# Patient Record
Sex: FEMALE | Race: WHITE | NOT HISPANIC OR LATINO | Employment: FULL TIME | URBAN - METROPOLITAN AREA
[De-identification: names, ages, dates, MRNs, and addresses within clinical notes are randomized per-mention and may not be internally consistent; named-entity substitution may affect disease eponyms.]

---

## 2017-01-03 ENCOUNTER — INITIAL CONSULT (OUTPATIENT)
Dept: BARIATRICS | Facility: CLINIC | Age: 37
End: 2017-01-03
Payer: COMMERCIAL

## 2017-01-03 VITALS
SYSTOLIC BLOOD PRESSURE: 124 MMHG | WEIGHT: 233.25 LBS | HEART RATE: 78 BPM | BODY MASS INDEX: 45.79 KG/M2 | DIASTOLIC BLOOD PRESSURE: 78 MMHG | HEIGHT: 60 IN

## 2017-01-03 DIAGNOSIS — E66.01 MORBID OBESITY WITH BMI OF 45.0-49.9, ADULT: ICD-10-CM

## 2017-01-03 DIAGNOSIS — K21.9 GASTROESOPHAGEAL REFLUX DISEASE, ESOPHAGITIS PRESENCE NOT SPECIFIED: ICD-10-CM

## 2017-01-03 PROCEDURE — 99244 OFF/OP CNSLTJ NEW/EST MOD 40: CPT | Mod: S$GLB,,, | Performed by: INTERNAL MEDICINE

## 2017-01-03 PROCEDURE — 99999 PR PBB SHADOW E&M-EST. PATIENT-LVL III: CPT | Mod: PBBFAC,,, | Performed by: INTERNAL MEDICINE

## 2017-01-03 RX ORDER — PHENTERMINE HYDROCHLORIDE 37.5 MG/1
37.5 TABLET ORAL
Qty: 30 TABLET | Refills: 0 | Status: SHIPPED | OUTPATIENT
Start: 2017-01-03 | End: 2017-02-02

## 2017-01-03 NOTE — PROGRESS NOTES
Subjective:       Patient ID: Sarah Ulloa is a 36 y.o. female.    Chief Complaint: Consult    HPI Comments: Current attempts at weight loss: New pt to me referred by Nadia Parks PA-C  [address] [place] [address] Patient Active Problem List:     Gastroesophageal reflux disease without esophagitis     BMI 45.0-49.9, adult     Bipolar Disorder  Has not been doing anything to work on her weight. She is not exercising. Recently had consult for possible Nissen and it was suggested that she lose weight first for better outcome.                    Previous diet attempts: Has done nutri-system in past lost 6-7 lbs in 2 months.     Heaviest weight: 233    Lightest weight: 125#    Goal weight: 160#    History of medication for loss: Surgery not covered. Phentermine and topiramate in past. Lost 15 lbs in 2 month.     Typical eating patterns:   Breakfast: Skips or coffee with 2 % milk and splenda.     Lunch: Lovely- Roast beef or turkey with quinones and mustard. SOmetimes chips.     Dinner: Goes out a lot. Chicken parmesean, Crab cakes.     Snacks: Ice cream.     Beverages: Diet green tea and Diet pepsi    Willingness to change:  10/10        BMR: 1680      Review of Systems   Constitutional: Negative for chills and fever.   Respiratory: Negative for apnea and shortness of breath.         + snores   Cardiovascular: Negative for chest pain and leg swelling.   Gastrointestinal: Negative for constipation and diarrhea.        + GERD   Genitourinary: Negative for difficulty urinating and menstrual problem.   Musculoskeletal: Positive for arthralgias and back pain.        Hips   Neurological: Negative for dizziness and light-headedness.   Psychiatric/Behavioral: Positive for dysphoric mood. The patient is nervous/anxious.         Under control. Sees Psych       Objective:       Visit Vitals    /78    Pulse 78    Ht 5' (1.524 m)    Wt 105.8 kg (233 lb 4 oz)    BMI 45.55 kg/m2       Physical Exam    Constitutional: She is oriented to person, place, and time. She appears well-developed. No distress.   Morbidly obese     HENT:   Head: Normocephalic and atraumatic.   Eyes: EOM are normal. Pupils are equal, round, and reactive to light.   Neck: Normal range of motion. Neck supple. No thyromegaly present.   Cardiovascular: Normal rate and normal heart sounds.  Exam reveals no gallop and no friction rub.    No murmur heard.  Pulmonary/Chest: Effort normal and breath sounds normal. No respiratory distress. She has no wheezes.   Abdominal: Soft. Bowel sounds are normal. She exhibits no distension. There is no tenderness.   Musculoskeletal: Normal range of motion. She exhibits no edema.   Neurological: She is alert and oriented to person, place, and time. No cranial nerve deficit.   Skin: Skin is warm and dry. No erythema.   Psychiatric: She has a normal mood and affect. Her behavior is normal. Judgment normal.   Vitals reviewed.      Assessment:       1. Gastroesophageal reflux disease, esophagitis presence not specified    2. Morbid obesity with BMI of 45.0-49.9, adult        Plan:             1. Gastroesophageal reflux disease, esophagitis presence not specified  May see some improvement in her sx with weight loss. If they do persist, then certainly the weight loss would help improve outcome of surgical treatment    2. Morbid obesity with BMI of 45.0-49.9, adult    - phentermine (ADIPEX-P) 37.5 mg tablet; Take 1 tablet (37.5 mg total) by mouth before breakfast.  Dispense: 30 tablet; Refill: 0    Exercise 30 min 3 days a week this month.     Wean artificial sweeteners. Replace with water.     3 meals a day made up of the following:  Unlimited green vegetables, tomatoes, mushrooms, spaghetti squash, cauliflower, meat, poultry, seafood, eggs and hard cheeses.   Avoid fried foods  Dressings, seasonings, condiments, etc should have less than 2 g sugars.    beans or nuts can have 1 x a day.   2-3 servings of citrus  fruits, berries, pineapple or melon a day (1 cup)  Milk and plain yogurt    [email or web address]    Patient warned of common side effects of phentermine including anxiety, insomnia, palpitations and increased blood pressure. It was also explained that it is for short-term usage along with diet and exercise, and that stopping the medication without making lifestyle changes will result in regain of weight. Patient states understanding.    Start phentermine with 1/2 pill a day to see if that will control your appetite.  Go up to a full pill when needed.     Return in about 4 weeks

## 2017-01-03 NOTE — PATIENT INSTRUCTIONS
Exercise 30 min 3 days a week this month.     Wean artificial sweeteners. Replace with water.     3 meals a day made up of the following:  Unlimited green vegetables, tomatoes, mushrooms, spaghetti squash, cauliflower, meat, poultry, seafood, eggs and hard cheeses.   Avoid fried foods  Dressings, seasonings, condiments, etc should have less than 2 g sugars.    beans or nuts can have 1 x a day.   2-3 servings of citrus fruits, berries, pineapple or melon a day (1 cup)  Milk and plain yogurt    [email or web address]    Patient warned of common side effects of phentermine including anxiety, insomnia, palpitations and increased blood pressure. It was also explained that it is for short-term usage along with diet and exercise, and that stopping the medication without making lifestyle changes will result in regain of weight. Patient states understanding.    Start phentermine with 1/2 pill a day to see if that will control your appetite.  Go up to a full pill when needed.     Return in about 4 weeks (around 1/31/2017).

## 2017-01-03 NOTE — MR AVS SNAPSHOT
[facility] - Bariatric Surgery  [address]  [facility] [address]  Phone: [phone]  Fax: [phone]                  Sarah Ulloa   1/3/2017 9:15 AM   Initial consult    Description:  Female : 1980   Provider:  Uzma Sanchez MD   Department:  [facility] - Bariatric Surgery           Reason for Visit     Consult           Diagnoses this Visit        Comments    Morbid obesity with BMI of 45.0-49.9, adult    -  Primary            To Do List           Future Appointments        Provider Department Dept Phone    2/3/2017 1:45 PM Uzma Sanchez MD [facility] - Bariatric Surgery [phone]      Goals (5 Years of Data)     None      Follow-Up and Disposition     Return in about 4 weeks (around 2017).       These Medications        Disp Refills Start End    phentermine (ADIPEX-P) 37.5 mg tablet 30 tablet 0 1/3/2017 2017    Take 1 tablet (37.5 mg total) by mouth before breakfast. - Oral    Pharmacy: [id number] - [place] - [address] [place] [address] #: [phone]         [facility] On Call     [facility] On Call Nurse Care Line -  Assistance  Registered nurses in the [facility] provide clinical advisement, health education, appointment booking, and other advisory services.  Call for this free service at [phone].             Medications           Message regarding Medications     Verify the changes and/or additions to your medication regime listed below are the same as discussed with your clinician today.  If any of these changes or additions are incorrect, please notify your healthcare provider.        START taking these NEW medications        Refills    phentermine (ADIPEX-P) 37.5 mg tablet 0    Sig: Take 1 tablet (37.5 mg total) by mouth before breakfast.    Class: Print    Route: Oral           Verify that the below list of medications is an accurate representation of the medications you are currently taking.  If none reported, the list may  be blank. If incorrect, please contact your healthcare provider. Carry this list with you in case of emergency.           Current Medications     buPROPion (WELLBUTRIN SR) 200 MG TbSR 200 mg 2 (two) times daily.    lamotrigine (LAMICTAL) 150 MG Tab 150 mg 2 (two) times daily.    pantoprazole (PROTONIX) 40 MG tablet Take 40 mg by mouth once daily.    phentermine (ADIPEX-P) 37.5 mg tablet Take 1 tablet (37.5 mg total) by mouth before breakfast.    SAPHRIS, BLACK CHERRY, 5 mg Subl 5 mg once daily.    tramadol (ULTRAM) 50 mg tablet 50 mg daily as needed. One or two tablets PRN    TRINTELLIX 10 mg Tab 10 mg once daily.           Clinical Reference Information           Vital Signs - Last Recorded  Most recent update: 1/3/2017  9:24 AM by Rosa Horowitz MA    BP Pulse Ht Wt BMI    124/78 78 5' (1.524 m) 105.8 kg (233 lb 4 oz) 45.55 kg/m2      Blood Pressure          Most Recent Value    BP  124/78      Allergies as of 1/3/2017     Percocet [Oxycodone-acetaminophen]    Sulfa (Sulfonamide Antibiotics)      Immunizations Administered on Date of Encounter - 1/3/2017     None      Instructions    Exercise 30 min 3 days a week this month.     Wean artificial sweeteners. Replace with water.     3 meals a day made up of the following:  Unlimited green vegetables, tomatoes, mushrooms, spaghetti squash, cauliflower, meat, poultry, seafood, eggs and hard cheeses.   Avoid fried foods  Dressings, seasonings, condiments, etc should have less than 2 g sugars.    beans or nuts can have 1 x a day.   2-3 servings of citrus fruits, berries, pineapple or melon a day (1 cup)  Milk and plain yogurt    [email or web address]    Patient warned of common side effects of phentermine including anxiety, insomnia, palpitations and increased blood pressure. It was also explained that it is for short-term usage along with diet and exercise, and that stopping the medication without making lifestyle changes will result in regain of weight. Patient states  understanding.    Start phentermine with 1/2 pill a day to see if that will control your appetite.  Go up to a full pill when needed.     Return in about 4 weeks (around 1/31/2017).

## 2017-01-03 NOTE — LETTER
January 4, 2017      Nadia Parks PA-C  [address]  [facility] [address]           [facility] - Bariatric Surgery  [address]  [facility] [address]  Phone: [phone]  Fax: [phone]          Patient: Sarah Ulloa   MR Number: [id number]   YOB: 1980   Date of Visit: 1/3/2017       Dear Nadia Parks:    Thank you for referring Sarah Ulloa to me for evaluation. Attached you will find relevant portions of my assessment and plan of care.    If you have questions, please do not hesitate to call me. I look forward to following Sarah Ulloa along with you.    Sincerely,    Uzma Sanchez MD    Enclosure  CC:  No Recipients    If you would like to receive this communication electronically, please contact [email or web address] or [phone] to request more information on [facility] access.    For providers and/or their staff who would like to refer a patient to [facility], please contact us through our one-stop-shop provider referral line, [facility], at [phone].    If you feel you have received this communication in error or would no longer like to receive these types of communications, please e-mail [email or web address]

## 2017-01-04 ENCOUNTER — PATIENT MESSAGE (OUTPATIENT)
Dept: BARIATRICS | Facility: CLINIC | Age: 37
End: 2017-01-04

## 2017-01-06 ENCOUNTER — TELEPHONE (OUTPATIENT)
Dept: SURGERY | Facility: CLINIC | Age: 37
End: 2017-01-06

## 2017-01-06 NOTE — TELEPHONE ENCOUNTER
----- Message from Jonathon Peck MD sent at 1/3/2017  2:24 PM CST -----  Regarding: RE: NISSEN VS LRNY  Thanks Malissa.    Team, please obtain her outside egd, motility and ph for possible lap fundo.  Ask her if she would like to see Dr. Sanchez for medical weight loss.    ----- Message -----     From: Nadia Parks PA-C     Sent: 12/23/2016   5:22 PM       To: Jonathon Peck MD  Subject: NISSEN VS LRNY                                   Dr. Peck,    This patient can't afford the out of pocket for the bypass.  She would like to do the Nissen for her reflux.  You mentioned to her that they may not approve it due to her being morbidly obese.  Can you call and discuss this with her.     Laura may be a good option.      Nadia

## 2017-01-10 ENCOUNTER — OFFICE VISIT (OUTPATIENT)
Dept: SURGERY | Facility: CLINIC | Age: 37
End: 2017-01-10
Payer: COMMERCIAL

## 2017-01-10 VITALS
TEMPERATURE: 99 F | HEART RATE: 101 BPM | HEIGHT: 60 IN | BODY MASS INDEX: 45.16 KG/M2 | SYSTOLIC BLOOD PRESSURE: 138 MMHG | WEIGHT: 230 LBS | DIASTOLIC BLOOD PRESSURE: 98 MMHG

## 2017-01-10 DIAGNOSIS — K21.9 GASTROESOPHAGEAL REFLUX DISEASE WITHOUT ESOPHAGITIS: Primary | ICD-10-CM

## 2017-01-10 PROCEDURE — 99999 PR PBB SHADOW E&M-EST. PATIENT-LVL III: CPT | Mod: PBBFAC,,, | Performed by: SURGERY

## 2017-01-10 PROCEDURE — 99213 OFFICE O/P EST LOW 20 MIN: CPT | Mod: S$GLB,,, | Performed by: SURGERY

## 2017-01-10 PROCEDURE — 1159F MED LIST DOCD IN RCRD: CPT | Mod: S$GLB,,, | Performed by: SURGERY

## 2017-01-10 NOTE — LETTER
[facility]  [address]  [facility] [address]  Phone: [phone] January 10, 2017      Oanh Germain MD  [address]   [address]  [place] [address]    Patient: Sarah Ulloa   MR Number: [id number]   YOB: 1980   Date of Visit: 1/10/2017     Dear Dr. Germain:    Thank you for referring Sarah Ulloa to me for evaluation. Below are the relevant portions of my assessment and plan of care.    Sarah Ulloa is a 36-year-old female.presents with:     1. Gastroesophageal reflux disease without esophagitis    2. BMI 45.0-49.9, adult      PLAN:  -  Will obtain ph off medications as last one was normal. Her insurance doesn't cover Bariatric surgery.        If you have questions, please do not hesitate to call me. I look forward to following Sarah along with you.    Sincerely,      Jonathon Peck MD   Section Head - General, Laparoscopic, Bariatric  Acute Care and Oncologic Surgery   - Surgical Weight Loss Program  [facility]    WSNORMA/evelia

## 2017-01-10 NOTE — PROGRESS NOTES
Subjective:       Patient ID: Sarah Ulloa is a 36 y.o. female.    Chief Complaint: Consult (lap nissen)    HPI She has morbid obesity and severe gerd symptoms.  She has heartburn with regurgitation and occasional vomiting.  She has cough and hoarseness but no asthma.  She gets worse off medications  Review of Systems   Constitutional: Negative for fever and unexpected weight change.        Just started medication for weight loss   Respiratory: Negative for chest tightness and shortness of breath.    Cardiovascular: Negative for chest pain.   Gastrointestinal: Negative for constipation and diarrhea.   Genitourinary: Negative for difficulty urinating.   Hematological: Does not bruise/bleed easily.       Objective:      Physical Exam   Constitutional: She is oriented to person, place, and time. She appears well-developed and well-nourished.   Neurological: She is alert and oriented to person, place, and time.   Skin: Skin is warm and dry.   Psychiatric: She has a normal mood and affect. Her behavior is normal. Judgment and thought content normal.   Vitals reviewed.      Assessment:       1. Gastroesophageal reflux disease without esophagitis    2. BMI 45.0-49.9, adult        Plan:       Will obtain ph off medications as last one was normal.  Her insurance doesn't cover bariatric surgery.

## 2017-01-11 ENCOUNTER — PATIENT MESSAGE (OUTPATIENT)
Dept: SURGERY | Facility: CLINIC | Age: 37
End: 2017-01-11

## 2017-01-16 ENCOUNTER — PATIENT MESSAGE (OUTPATIENT)
Dept: BARIATRICS | Facility: CLINIC | Age: 37
End: 2017-01-16

## 2017-02-03 ENCOUNTER — PATIENT MESSAGE (OUTPATIENT)
Dept: SURGERY | Facility: CLINIC | Age: 37
End: 2017-02-03

## 2017-02-08 ENCOUNTER — PATIENT MESSAGE (OUTPATIENT)
Dept: SURGERY | Facility: CLINIC | Age: 37
End: 2017-02-08

## 2017-02-13 ENCOUNTER — ANESTHESIA EVENT (OUTPATIENT)
Dept: ENDOSCOPY | Facility: HOSPITAL | Age: 37
End: 2017-02-13
Payer: COMMERCIAL

## 2017-02-13 ENCOUNTER — ANESTHESIA (OUTPATIENT)
Dept: ENDOSCOPY | Facility: HOSPITAL | Age: 37
End: 2017-02-13
Payer: COMMERCIAL

## 2017-02-13 ENCOUNTER — HOSPITAL ENCOUNTER (OUTPATIENT)
Facility: HOSPITAL | Age: 37
Discharge: HOME | End: 2017-02-13
Attending: INTERNAL MEDICINE | Admitting: INTERNAL MEDICINE
Payer: COMMERCIAL

## 2017-02-13 ENCOUNTER — SURGERY (OUTPATIENT)
Age: 37
End: 2017-02-13

## 2017-02-13 VITALS
BODY MASS INDEX: 45.16 KG/M2 | OXYGEN SATURATION: 97 % | DIASTOLIC BLOOD PRESSURE: 78 MMHG | HEIGHT: 60 IN | RESPIRATION RATE: 18 BRPM | WEIGHT: 230 LBS | RESPIRATION RATE: 59 BRPM | HEART RATE: 98 BPM | SYSTOLIC BLOOD PRESSURE: 140 MMHG | TEMPERATURE: 99 F

## 2017-02-13 DIAGNOSIS — K21.9 GERD (GASTROESOPHAGEAL REFLUX DISEASE): ICD-10-CM

## 2017-02-13 DIAGNOSIS — K21.9 GASTROESOPHAGEAL REFLUX DISEASE WITHOUT ESOPHAGITIS: Primary | ICD-10-CM

## 2017-02-13 LAB
B-HCG UR QL: NEGATIVE
CTP QC/QA: YES

## 2017-02-13 PROCEDURE — 25000003 PHARM REV CODE 250: Performed by: NURSE ANESTHETIST, CERTIFIED REGISTERED

## 2017-02-13 PROCEDURE — 37000008 HC ANESTHESIA 1ST 15 MINUTES: Performed by: INTERNAL MEDICINE

## 2017-02-13 PROCEDURE — D9220A PRA ANESTHESIA: Mod: CRNA,,, | Performed by: NURSE ANESTHETIST, CERTIFIED REGISTERED

## 2017-02-13 PROCEDURE — 27201012 HC FORCEPS, HOT/COLD, DISP: Performed by: INTERNAL MEDICINE

## 2017-02-13 PROCEDURE — 63600175 PHARM REV CODE 636 W HCPCS: Performed by: ANESTHESIOLOGY

## 2017-02-13 PROCEDURE — 63600175 PHARM REV CODE 636 W HCPCS: Performed by: NURSE ANESTHETIST, CERTIFIED REGISTERED

## 2017-02-13 PROCEDURE — 88305 TISSUE EXAM BY PATHOLOGIST: CPT | Performed by: PATHOLOGY

## 2017-02-13 PROCEDURE — 91035 G-ESOPH REFLX TST W/ELECTROD: CPT | Mod: 26,,, | Performed by: INTERNAL MEDICINE

## 2017-02-13 PROCEDURE — 43239 EGD BIOPSY SINGLE/MULTIPLE: CPT | Mod: ,,, | Performed by: INTERNAL MEDICINE

## 2017-02-13 PROCEDURE — D9220A PRA ANESTHESIA: Mod: ANES,,, | Performed by: ANESTHESIOLOGY

## 2017-02-13 PROCEDURE — 25000003 PHARM REV CODE 250: Performed by: INTERNAL MEDICINE

## 2017-02-13 PROCEDURE — 88305 TISSUE EXAM BY PATHOLOGIST: CPT | Mod: 26,,, | Performed by: PATHOLOGY

## 2017-02-13 PROCEDURE — 81025 URINE PREGNANCY TEST: CPT | Performed by: INTERNAL MEDICINE

## 2017-02-13 PROCEDURE — 91035 G-ESOPH REFLX TST W/ELECTROD: CPT | Performed by: INTERNAL MEDICINE

## 2017-02-13 PROCEDURE — 37000009 HC ANESTHESIA EA ADD 15 MINS: Performed by: INTERNAL MEDICINE

## 2017-02-13 PROCEDURE — 43239 EGD BIOPSY SINGLE/MULTIPLE: CPT | Performed by: INTERNAL MEDICINE

## 2017-02-13 PROCEDURE — 27200942: Performed by: INTERNAL MEDICINE

## 2017-02-13 RX ORDER — FENTANYL CITRATE 50 UG/ML
INJECTION, SOLUTION INTRAMUSCULAR; INTRAVENOUS
Status: DISCONTINUED | OUTPATIENT
Start: 2017-02-13 | End: 2017-02-13

## 2017-02-13 RX ORDER — SODIUM CHLORIDE 9 MG/ML
INJECTION, SOLUTION INTRAVENOUS CONTINUOUS
Status: DISCONTINUED | OUTPATIENT
Start: 2017-02-13 | End: 2017-02-13 | Stop reason: HOSPADM

## 2017-02-13 RX ORDER — LIDOCAINE HCL/PF 100 MG/5ML
SYRINGE (ML) INTRAVENOUS
Status: DISCONTINUED | OUTPATIENT
Start: 2017-02-13 | End: 2017-02-13

## 2017-02-13 RX ORDER — ONDANSETRON 2 MG/ML
4 INJECTION INTRAMUSCULAR; INTRAVENOUS ONCE
Status: COMPLETED | OUTPATIENT
Start: 2017-02-13 | End: 2017-02-13

## 2017-02-13 RX ORDER — PROPOFOL 10 MG/ML
VIAL (ML) INTRAVENOUS
Status: DISCONTINUED | OUTPATIENT
Start: 2017-02-13 | End: 2017-02-13

## 2017-02-13 RX ADMIN — LIDOCAINE HYDROCHLORIDE 100 MG: 20 INJECTION, SOLUTION INTRAVENOUS at 10:02

## 2017-02-13 RX ADMIN — SODIUM CHLORIDE: 0.9 INJECTION, SOLUTION INTRAVENOUS at 09:02

## 2017-02-13 RX ADMIN — PROPOFOL 50 MG: 10 INJECTION, EMULSION INTRAVENOUS at 10:02

## 2017-02-13 RX ADMIN — PROPOFOL 100 MG: 10 INJECTION, EMULSION INTRAVENOUS at 10:02

## 2017-02-13 RX ADMIN — ONDANSETRON 4 MG: 2 INJECTION INTRAMUSCULAR; INTRAVENOUS at 11:02

## 2017-02-13 RX ADMIN — FENTANYL CITRATE 25 MCG: 50 INJECTION, SOLUTION INTRAMUSCULAR; INTRAVENOUS at 10:02

## 2017-02-13 NOTE — ANESTHESIA POSTPROCEDURE EVALUATION
Anesthesia Post Evaluation    Patient: Sarah Ulloa    Procedure(s) Performed: Procedure(s) (LRB):  ESOPHAGOGASTRODUODENOSCOPY (EGD) (N/A)  ESOPHAGEAL BRAVO PH (N/A)    Final Anesthesia Type: general  Patient location during evaluation: PACU  Patient participation: Yes- Able to Participate  Level of consciousness: awake and alert and oriented  Post-procedure vital signs: reviewed and stable  Pain management: adequate  Airway patency: patent  PONV status at discharge: No PONV  Anesthetic complications: no      Cardiovascular status: hemodynamically stable  Respiratory status: unassisted, spontaneous ventilation and room air  Hydration status: euvolemic  Follow-up not needed.        Visit Vitals    BP (!) 140/78    Pulse 98    Temp 37.1 °C (98.8 °F) (Oral)    Resp 18    Ht 5' (1.524 m)    Wt 104.3 kg (230 lb)    LMP 02/06/2017 (Exact Date)    SpO2 97%    Breastfeeding No    BMI 44.92 kg/m2       Pain/Marely Score: Pain Assessment Performed: Yes (2/13/2017 11:28 AM)  Presence of Pain: denies (2/13/2017 11:28 AM)  Marely Score: 10 (2/13/2017 11:28 AM)

## 2017-02-13 NOTE — OR NURSING
Bravo instructions given, Pt to bring recorder and diary sheet back Wed. 2/15 after 1057. Pt and Pt father verbalized understanding.

## 2017-02-13 NOTE — TRANSFER OF CARE
Anesthesia Transfer of Care Note    Patient: Sarah Ulloa    Procedure(s) Performed: Procedure(s) (LRB):  ESOPHAGOGASTRODUODENOSCOPY (EGD) (N/A)  ESOPHAGEAL BRAVO PH (N/A)    Patient location: PACU    Anesthesia Type: general    Transport from OR: Transported from OR on room air with adequate spontaneous ventilation    Post pain: adequate analgesia    Post assessment: no apparent anesthetic complications and tolerated procedure well    Post vital signs: stable    Level of consciousness: awake, alert and oriented    Nausea/Vomiting: no nausea/vomiting    Complications: none          Last vitals:   Visit Vitals    /77 (BP Location: Left arm, Patient Position: Lying, BP Method: Automatic)    Pulse 88    Temp 37.1 °C (98.8 °F) (Oral)    Resp 18    Ht 5' (1.524 m)    Wt 104.3 kg (230 lb)    LMP 02/06/2017 (Exact Date)    SpO2 95%    Breastfeeding No    BMI 44.92 kg/m2

## 2017-02-13 NOTE — H&P
Short Stay Endoscopy History and Physical    PCP - Oanh Chahal MD    Procedure - EGD  ASA - per anesthesia  Mallampati - per anesthesia  History of Anesthesia problems - no  Family history Anesthesia problems -  no   Plan of anesthesia - MAC    HPI:  This is a 36 y.o. female here for evaluation of GERD and is interested in surgical intervention.  PPI helps, but still has breakthrough symptoms.        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    Surgical History:  has a past surgical history that includes Anterior cervical discectomy w/ fusion (2016); Tonsillectomy (2007); and Esophagogastroduodenoscopy.    Family History: family history is not on file.. Otherwise no colon cancer, inflammatory bowel disease, or GI malignancies.    Social History:  reports that she quit smoking about 5 years ago. She does not have any smokeless tobacco history on file. She reports that she does not drink alcohol or use illicit drugs.    Review of patient's allergies indicates:   Allergen Reactions    Percocet [oxycodone-acetaminophen] Nausea And Vomiting    Sulfa (sulfonamide antibiotics) Rash       Medications:   Prescriptions Prior to Admission   Medication Sig Dispense Refill Last Dose    buPROPion (WELLBUTRIN SR) 200 MG TbSR 200 mg 2 (two) times daily.   2/13/2017 at Unknown time    lamotrigine (LAMICTAL) 150 MG Tab 150 mg 2 (two) times daily.   2/13/2017 at Unknown time    pantoprazole (PROTONIX) 40 MG tablet Take 40 mg by mouth once daily.   1/30/2017 at Unknown time    SAPHRIS, BLACK CHERRY, 5 mg Subl 5 mg once daily.   2/12/2017 at  Unknown time    TRINTELLIX 10 mg Tab 10 mg once daily.   2/13/2017 at Unknown time    tramadol (ULTRAM) 50 mg tablet 50 mg daily as needed. One or two tablets PRN   More than a month at Unknown time       Physical Exam:    Vital Signs:   Vitals:    02/13/17 0914   BP: 130/77   Pulse: 88   Resp: 18   Temp: 98.8 °F (37.1 °C)       General Appearance: Well appearing in no acute distress  Eyes:    No scleral icterus  ENT: Neck supple, Lips, mucosa, and tongue normal; teeth and gums normal  Lungs: CTA anteriorly  Heart:  Regular rate, S1, S2 normal, no murmurs heard.  Abdomen: Soft, non tender, non distended with normal bowel sounds. No hepatosplenomegaly, ascites, or mass.      Labs:  Lab Results   Component Value Date    WBC 12.2 (H) 04/11/2010    HGB 12.4 04/11/2010    HCT 36.0 (L) 04/11/2010     04/11/2010    ALT 10 04/11/2010    AST 15 04/11/2010     04/11/2010    K 3.1 (L) 04/11/2010     04/11/2010    CREATININE 0.9 04/11/2010    BUN 8 04/11/2010    CO2 20 (L) 04/11/2010    TSH 3.2 04/22/2008    INR 1.0 08/16/2007         Assessment:  36 y.o. female with GERD.    Plan:  Proceed with EGD with Bravo pH probe placement today.  I have explained the risks and benefits of endoscopy procedures to the patient including but not limited to bleeding, perforation, infection, and death.  All questions answered.      Amauri Ayala MD

## 2017-02-13 NOTE — ANESTHESIA PREPROCEDURE EVALUATION
02/13/2017  Sarah Ulloa is a 36 y.o., female.    [facility] Anesthesia Evaluation    I have reviewed the Patient Summary Reports.    I have reviewed the Nursing Notes.   I have reviewed the Medications.     Review of Systems  Anesthesia Hx:  No problems with previous Anesthesia  History of prior surgery of interest to airway management or planning: Previous anesthesia: General  Denies Personal Hx of Anesthesia complications.   Cardiovascular:  Cardiovascular Normal     Pulmonary:   Sleep Apnea    Renal/:  Renal/ Normal     Hepatic/GI:  Hepatic/GI Normal    Musculoskeletal:   Arthritis     Neurological:   Neuromuscular Disease,    Endocrine:  Endocrine Normal  Metabolic Disorders, Morbid Obesity / BMI > 40  Psych:   Psychiatric History Bipolar 1         Physical Exam  General:  Morbid Obesity    Airway/Jaw/Neck:  Airway Findings: Mouth Opening: Normal Tongue: Normal  General Airway Assessment: Adult  Mallampati: II  TM Distance: Normal, at least 6 cm  Jaw/Neck Findings:  Neck ROM: Normal ROM      Dental:  Dental Findings: In tact   Chest/Lungs:  Chest/Lungs Findings: Clear to auscultation     Heart/Vascular:  Heart Findings: Rate: Normal  Rhythm: Regular Rhythm  Sounds: Normal        Mental Status:  Mental Status Findings:  Cooperative, Alert and Oriented         Anesthesia Plan  Type of Anesthesia, risks & benefits discussed:  Anesthesia Type:  general  Patient's Preference: general  Intra-op Monitoring Plan:   Intra-op Monitoring Plan Comments:   Post Op Pain Control Plan:   Post Op Pain Control Plan Comments:   Induction:   IV  Beta Blocker:  Patient is not currently on a Beta-Blocker (No further documentation required).       Informed Consent: Patient understands risks and agrees with Anesthesia plan.  Questions answered. Anesthesia consent signed with patient.  ASA Score: 3     Day of Surgery Review  of History & Physical:    H&P update referred to the surgeon.         Ready For Surgery From Anesthesia Perspective.

## 2017-02-13 NOTE — IP AVS SNAPSHOT
[facility]  [address]  [facility] [address]  Phone: [phone]           Patient Discharge Instructions     Our goal is to set you up for success. This packet includes information on your condition, medications, and your home care. It will help you to care for yourself so you don't get sicker and need to go back to the hospital.     Please ask your nurse if you have any questions.        There are many details to remember when preparing to leave the hospital. Here is what you will need to do:    1. Take your medicine. If you are prescribed medications, review your Medication List in the following pages. You may have new medications to  at the pharmacy and others that you'll need to stop taking. Review the instructions for how and when to take your medications. Talk with your doctor or nurses if you are unsure of what to do.     2. Go to your follow-up appointments. Specific follow-up information is listed in the following pages. Your may be contacted by a transition nurse or clinical provider about future appointments. Be sure we have all of the phone numbers to reach you, if needed. Please contact your provider's office if you are unable to make an appointment.     3. Watch for warning signs. Your doctor or nurse will give you detailed warning signs to watch for and when to call for assistance. These instructions may also include educational information about your condition. If you experience any of warning signs to your health, call your doctor.               [facility]  Unless otherwise directed by your provider, please contact [facility], our nurse care line that is available for 24/7 assistance.     [phone] (toll-free)    Registered nurses in the [facility] provide clinical advisement, health education, appointment booking, and other advisory services.                    ** Verify the list of medication(s) below is accurate and up  to date. Carry this with you in case of emergency. If your medications have changed, please notify your healthcare provider.             Medication List      CONTINUE taking these medications        Additional Info                      buPROPion 200 MG Tbsr   Commonly known as:  WELLBUTRIN SR   Refills:  0   Dose:  200 mg    Instructions:  200 mg 2 (two) times daily.     Begin Date    AM    Noon    PM    Bedtime       lamotrigine 150 MG Tab   Commonly known as:  LAMICTAL   Refills:  0   Dose:  150 mg    Instructions:  150 mg 2 (two) times daily.     Begin Date    AM    Noon    PM    Bedtime       pantoprazole 40 MG tablet   Commonly known as:  PROTONIX   Refills:  0   Dose:  40 mg    Instructions:  Take 40 mg by mouth once daily.     Begin Date    AM    Noon    PM    Bedtime       SAPHRIS (BLACK CHERRY) 5 mg Subl   Refills:  0   Dose:  5 mg   Generic drug:  asenapine    Instructions:  5 mg once daily.     Begin Date    AM    Noon    PM    Bedtime       tramadol 50 mg tablet   Commonly known as:  ULTRAM   Refills:  0   Dose:  50 mg    Instructions:  50 mg daily as needed. One or two tablets PRN     Begin Date    AM    Noon    PM    Bedtime       TRINTELLIX 10 mg Tab   Refills:  0   Dose:  10 mg   Generic drug:  vortioxetine    Instructions:  10 mg once daily.     Begin Date    AM    Noon    PM    Bedtime                  Please bring to all follow up appointments:    1. A copy of your discharge instructions.  2. All medicines you are currently taking in their original bottles.  3. Identification and insurance card.    Please arrive 15 minutes ahead of scheduled appointment time.    Please call 24 hours in advance if you must reschedule your appointment and/or time.        Your Scheduled Appointments     Feb 22, 2017  1:45 PM CST   Established Patient Visit with MD [facility] - Bariatric Surgery ([address] )    [address]  [facility] [address] [phone]                 Discharge Instructions     Future Orders    Activity as tolerated     Call MD for:  difficulty breathing, headache or visual disturbances     Call MD for:  persistent nausea and vomiting     Call MD for:  severe uncontrolled pain     Call MD for:  temperature >100.4     Diet general     Questions:    Total calories:      Fat restriction, if any:      Protein restriction, if any:      Na restriction, if any:      Fluid restriction:      Additional restrictions:          Discharge Instructions         Tips to Control Acid Reflux    To control acid reflux, youll need to make some basic diet and lifestyle changes. The simple steps outlined below may be all youll need to ease discomfort.  Watch what you eat  · Avoid fatty foods and spicy foods.  · Eat fewer acidic foods, such as citrus and tomato-based foods. These can increase symptoms.  · Limit drinking alcohol, caffeine, and fizzy beverages. All increase acid reflux.  · Try limiting chocolate, peppermint, and spearmint. These can worsen acid reflux in some people.  Watch when you eat  · Avoid lying down for 3 hours after eating.  · Do not snack before going to bed.  Raise your head  Raising your head and upper body by 4 to 6 inches helps limit reflux when youre lying down. Put blocks under the head of your bed frame to raise it.  Other changes  · Lose weight, if you need to  · Dont exercise near bedtime  · Avoid tight-fitting clothes  · Limit aspirin and ibuprofen  · Stop smoking   Date Last Reviewed: 7/1/2016  © 5029-9426 [employer]. [address] [place] [address], [place] [address]. All rights reserved. This information is not intended as a substitute for professional medical care. Always follow your healthcare professional's instructions.        Upper GI Endoscopy     During endoscopy, a long, flexible tube is used to view the inside of your upper GI tract.      Upper GI endoscopy allows your healthcare provider to look directly into the beginning of  your gastrointestinal (GI) tract. The esophagus, stomach, and duodenum (the first part of the small intestine) make up the upper GI tract.   Before the exam  Follow these and any other instructions you are given before your endoscopy. If you dont follow the healthcare providers instructions carefully, the test may need to be canceled or done over:  · Don't eat or drink anything after midnight the night before your exam. If your exam is in the afternoon, drink only clear liquids in the morning. Don't eat or drink anything for 8 hours before the exam. In some cases, you may be able to take medicines with sips of water until 2 hours before the procedure. Speak with your healthcare provider about this.   · Bring your X-rays and any other test results you have.  · Because you will be sedated, arrange for an adult to drive you home after the exam.  · Tell your healthcare provider before the exam if you are taking any medicines or have any medical problems.  The procedure  Here is what to expect:  · You will lie on the endoscopy table. Usually patients lie on the left side.  · You will be monitored and given oxygen.  · Your throat may be numbed with a spray or gargle. You are given medicine through an intravenous (IV) line that will help you relax and remain comfortable. You may be awake or asleep during the procedure.  · The healthcare provider will put the endoscope in your mouth and down your esophagus. It is thinner than most pieces of food that you swallow. It will not affect your breathing. The medicine helps keep you from gagging.  · Air is put into your GI tract to expand it. It can make you burp.  · During the procedure, the healthcare provider can take biopsies (tissue samples), remove abnormalities, such as polyps, or treat abnormalities through a variety of devices placed through the endoscope. You will not feel this.   · The endoscope carries images of your upper GI tract to a video screen. If you are awake,  you may be able to look at the images.  · After the procedure is done, you will rest for a time. An adult must drive you home.  When to call your healthcare provider  Contact your healthcare provider if you have:  · Black or tarry stools, or blood in your stool  · Fever  · Pain in your belly that does not go away  · Nausea and vomiting, or vomiting blood   Date Last Reviewed: 7/1/2016  © 8078-0790 [employer]. [address] [place] [address], [place] [address]. All rights reserved. This information is not intended as a substitute for professional medical care. Always follow your healthcare professional's instructions.            Admission Information     Date & Time Provider Department CSN    2/13/2017  8:50 AM Amauri Ayala MD [facility] [id number]      Care Providers     Provider Role Specialty Primary office phone    Amauri Ayala MD Attending Provider Gastroenterology [phone]    Amauri Ayala MD Surgeon  Gastroenterology [phone]      Your Vitals Were     BP Pulse Temp Resp Height Weight    140/78 98 98.8 °F (37.1 °C) (Oral) 18 5' (1.524 m) 104.3 kg (230 lb)    Last Period SpO2 BMI          02/06/2017 (Exact Date) 97% 44.92 kg/m2        Recent Lab Values     No lab values to display.      Pending Labs     Order Current Status    Specimen to Pathology - Surgery Collected (02/13/17 1100)      Allergies as of 2/13/2017        Reactions    Percocet [Oxycodone-acetaminophen] Nausea And Vomiting    Sulfa (Sulfonamide Antibiotics) Rash      Advance Directives     An advance directive is a document which, in the event you are no longer able to make decisions for yourself, tells your healthcare team what kind of treatment you do or do not want to receive, or who you would like to make those decisions for you.  If you do not currently have an advance directive, [facility] encourages you to create one.  For more information call:  [phone]), [phone]  ([phone]),  or log on to [email or web address].        Smoking Cessation     If you would like to quit smoking:   You may be eligible for free services if you are a [place] resident and started smoking cigarettes before September 1, 1988.  Call the Smoking Cessation Trust (SCT) toll free at (429) 001-1064 or (478) 683-4658.   Call 1-800-QUIT-NOW if you do not meet the above criteria.            Language Assistance Services     ATTENTION: Language assistance services are available, free of charge. Please call [phone].      ATENCIÓN: Si habla español, tiene a pereira disposición servicios gratuitos de asistencia lingüística. Llame al [phone].     CHÚ Ý: N?u b?n nói Ti?ng Vi?t, có các d?ch v? h? tr? ngôn ng? mi?n phí dành cho b?n. G?i s? [phone].         [facility] complies with applicable Federal civil rights laws and does not discriminate on the basis of race, color, national origin, age, disability, or sex.

## 2017-02-13 NOTE — OR NURSING
"Pt complaining of nausea, "I feel like I'm about to throw up." Anesthesia notified, orders given and carried out. Will continue to monitor Pt. Pt NAD, RA.  "

## 2017-02-13 NOTE — DISCHARGE INSTRUCTIONS
Tips to Control Acid Reflux    To control acid reflux, youll need to make some basic diet and lifestyle changes. The simple steps outlined below may be all youll need to ease discomfort.  Watch what you eat  · Avoid fatty foods and spicy foods.  · Eat fewer acidic foods, such as citrus and tomato-based foods. These can increase symptoms.  · Limit drinking alcohol, caffeine, and fizzy beverages. All increase acid reflux.  · Try limiting chocolate, peppermint, and spearmint. These can worsen acid reflux in some people.  Watch when you eat  · Avoid lying down for 3 hours after eating.  · Do not snack before going to bed.  Raise your head  Raising your head and upper body by 4 to 6 inches helps limit reflux when youre lying down. Put blocks under the head of your bed frame to raise it.  Other changes  · Lose weight, if you need to  · Dont exercise near bedtime  · Avoid tight-fitting clothes  · Limit aspirin and ibuprofen  · Stop smoking   Date Last Reviewed: 7/1/2016 © 2000-2016 [employer]. [address] [place] [address]. All rights reserved. This information is not intended as a substitute for professional medical care. Always follow your healthcare professional's instructions.        Upper GI Endoscopy     During endoscopy, a long, flexible tube is used to view the inside of your upper GI tract.      Upper GI endoscopy allows your healthcare provider to look directly into the beginning of your gastrointestinal (GI) tract. The esophagus, stomach, and duodenum (the first part of the small intestine) make up the upper GI tract.   Before the exam  Follow these and any other instructions you are given before your endoscopy. If you dont follow the healthcare providers instructions carefully, the test may need to be canceled or done over:  · Don't eat or drink anything after midnight the night before your exam. If your exam is in the afternoon, drink only clear liquids in the morning. Don't  eat or drink anything for 8 hours before the exam. In some cases, you may be able to take medicines with sips of water until 2 hours before the procedure. Speak with your healthcare provider about this.   · Bring your X-rays and any other test results you have.  · Because you will be sedated, arrange for an adult to drive you home after the exam.  · Tell your healthcare provider before the exam if you are taking any medicines or have any medical problems.  The procedure  Here is what to expect:  · You will lie on the endoscopy table. Usually patients lie on the left side.  · You will be monitored and given oxygen.  · Your throat may be numbed with a spray or gargle. You are given medicine through an intravenous (IV) line that will help you relax and remain comfortable. You may be awake or asleep during the procedure.  · The healthcare provider will put the endoscope in your mouth and down your esophagus. It is thinner than most pieces of food that you swallow. It will not affect your breathing. The medicine helps keep you from gagging.  · Air is put into your GI tract to expand it. It can make you burp.  · During the procedure, the healthcare provider can take biopsies (tissue samples), remove abnormalities, such as polyps, or treat abnormalities through a variety of devices placed through the endoscope. You will not feel this.   · The endoscope carries images of your upper GI tract to a video screen. If you are awake, you may be able to look at the images.  · After the procedure is done, you will rest for a time. An adult must drive you home.  When to call your healthcare provider  Contact your healthcare provider if you have:  · Black or tarry stools, or blood in your stool  · Fever  · Pain in your belly that does not go away  · Nausea and vomiting, or vomiting blood   Date Last Reviewed: 7/1/2016  © 4782-1159 [employer]. [address] [place] [address], [place] [address]. All rights reserved. This  information is not intended as a substitute for professional medical care. Always follow your healthcare professional's instructions.

## 2017-02-15 ENCOUNTER — TELEPHONE (OUTPATIENT)
Dept: GASTROENTEROLOGY | Facility: CLINIC | Age: 37
End: 2017-02-15

## 2017-02-15 NOTE — TELEPHONE ENCOUNTER
Patient's 48-hour Bravo pH study results are back and indicate that the patient does have significantly abnormal acid reflux and there is good correlation with her symptoms.  Acid reflux surgery would be appropriate to consider.  Return to Dr. Peck to discuss further.

## 2017-02-16 ENCOUNTER — PATIENT MESSAGE (OUTPATIENT)
Dept: SURGERY | Facility: CLINIC | Age: 37
End: 2017-02-16

## 2017-02-17 DIAGNOSIS — K21.9 GERD WITHOUT ESOPHAGITIS: Primary | ICD-10-CM

## 2017-02-21 ENCOUNTER — APPOINTMENT (OUTPATIENT)
Dept: LAB | Facility: HOSPITAL | Age: 37
End: 2017-02-21
Payer: COMMERCIAL

## 2017-02-21 ENCOUNTER — OFFICE VISIT (OUTPATIENT)
Dept: SURGERY | Facility: CLINIC | Age: 37
End: 2017-02-21
Payer: COMMERCIAL

## 2017-02-21 VITALS — HEIGHT: 60 IN | TEMPERATURE: 98 F | BODY MASS INDEX: 45.16 KG/M2 | WEIGHT: 230 LBS

## 2017-02-21 DIAGNOSIS — K21.9 GASTROESOPHAGEAL REFLUX DISEASE WITHOUT ESOPHAGITIS: Primary | ICD-10-CM

## 2017-02-21 PROCEDURE — 99999 PR PBB SHADOW E&M-EST. PATIENT-LVL III: CPT | Mod: PBBFAC,,, | Performed by: SURGERY

## 2017-02-21 PROCEDURE — 99213 OFFICE O/P EST LOW 20 MIN: CPT | Mod: S$GLB,,, | Performed by: SURGERY

## 2017-02-21 PROCEDURE — 1160F RVW MEDS BY RX/DR IN RCRD: CPT | Mod: S$GLB,,, | Performed by: SURGERY

## 2017-02-21 RX ORDER — TRIAMCINOLONE ACETONIDE 1 MG/G
CREAM TOPICAL
COMMUNITY
Start: 2017-02-12 | End: 2018-02-21

## 2017-02-21 RX ORDER — ONDANSETRON 4 MG/1
TABLET, FILM COATED ORAL
COMMUNITY
Start: 2017-02-05 | End: 2017-03-02

## 2017-02-21 RX ORDER — HYDROCODONE POLISTIREX AND CHLORPHENIRAMINE POLISTIREX 10; 8 MG/5ML; MG/5ML
SUSPENSION, EXTENDED RELEASE ORAL
COMMUNITY
Start: 2017-02-05 | End: 2017-03-02

## 2017-02-21 RX ORDER — AZITHROMYCIN 250 MG/1
TABLET, FILM COATED ORAL
COMMUNITY
Start: 2017-02-05 | End: 2017-03-02 | Stop reason: ALTCHOICE

## 2017-02-21 NOTE — LETTER
[facility] - General Surgery  [address]  [facility] [address]  Phone: [phone] February 21, 2017      Oanh Germain MD  [address]  [place] [address]    Patient: Sarah Ulloa   MR Number: [id number]   YOB: 1980   Date of Visit: 2/21/2017     Dear Dr. Germain:    Thank you for referring Sarah Ulloa to me for evaluation. Below are the relevant portions of my assessment and plan of care.    Sarah Ulloa is a 36-year-old female;  1. Gastroesophageal reflux disease without esophagitis    2. BMI 45.0-49.9, adult        Plan:       Lap HH with Toupet fundoplication       If you have questions, please do not hesitate to call me. I look forward to following Sarah along with you.    Sincerely,      Jonathon Peck MD   Section Head - General, Laparoscopic, Bariatric  Acute Care and Oncologic Surgery   - Surgical Weight Loss Program  [facility]    WSR/evelia    CC  MD Nadia Corea PA-C Jennifer M Malsbury,

## 2017-02-23 ENCOUNTER — PATIENT MESSAGE (OUTPATIENT)
Dept: SURGERY | Facility: CLINIC | Age: 37
End: 2017-02-23

## 2017-03-02 ENCOUNTER — TELEPHONE (OUTPATIENT)
Dept: SURGERY | Facility: CLINIC | Age: 37
End: 2017-03-02

## 2017-03-02 ENCOUNTER — ANESTHESIA EVENT (OUTPATIENT)
Dept: SURGERY | Facility: HOSPITAL | Age: 37
DRG: 327 | End: 2017-03-02
Payer: COMMERCIAL

## 2017-03-02 NOTE — TELEPHONE ENCOUNTER
Sarah Cao Laila notified that her surgery is scheduled for 1145 on 3/2/17. she needs to arrive to the 2nd floor DOSC in the hospital @ 0945. she should not eat or drink anything after midnight. Pt verbalizes understanding. Post op appointment reminder mailed.

## 2017-03-02 NOTE — PRE-PROCEDURE INSTRUCTIONS
Preop instructions: NPO after midnight or 8 hours prior to procedure time, shower instructions, directions, leave all valuables at home, medication instructions for PM prior & am of procedure explained. Patient stated an understanding.    Patient report PONV with past anesthesia, never pretreated    Patient does not know arrival time. Explained that this information comes from the surgeons office and if they have not heard from them by 2pm to call office. Patient stated an understanding.

## 2017-03-02 NOTE — ANESTHESIA PREPROCEDURE EVALUATION
03/02/2017   Pre-operative evaluation for Procedure(s) (LRB):  REPAIR HERNIA LAPAROSCOPIC HIATAL   (N/A)  [id number] (N/A)    Sarah Ulloa is a 36 y.o. female with PMH of GERD, KOREY and morbid obesity who is being evaluated for the above procedure for uncontrolled GERD.     LDA: none     Prev airway:  None on file     Drips: none     Patient Active Problem List   Diagnosis    Gastroesophageal reflux disease without esophagitis    BMI 45.0-49.9, adult    GERD (gastroesophageal reflux disease)       Review of patient's allergies indicates:   Allergen Reactions    Percocet [oxycodone-acetaminophen] Nausea And Vomiting    Sulfa (sulfonamide antibiotics) Rash        No current facility-administered medications on file prior to encounter.      Current Outpatient Prescriptions on File Prior to Encounter   Medication Sig Dispense Refill    buPROPion (WELLBUTRIN SR) 200 MG TbSR 200 mg 2 (two) times daily.      lamotrigine (LAMICTAL) 150 MG Tab 150 mg 2 (two) times daily.      pantoprazole (PROTONIX) 40 MG tablet Take 40 mg by mouth once daily.      SAPHRIS, BLACK CHERRY, 5 mg Subl 5 mg once daily.      TRINTELLIX 10 mg Tab 10 mg once daily.      tramadol (ULTRAM) 50 mg tablet 50 mg daily as needed. One or two tablets PRN      triamcinolone acetonide 0.1% (KENALOG) 0.1 % cream          Past Surgical History:   Procedure Laterality Date    ANTERIOR CERVICAL DISCECTOMY W/ FUSION  2016    ESOPHAGOGASTRODUODENOSCOPY      TONSILLECTOMY  2007       Social History     Social History    Marital status: Single     Spouse name: N/A    Number of children: N/A    Years of education: N/A     Occupational History    Not on file.     Social History Main Topics    Smoking status: Former Smoker     Quit date: 2/13/2012    Smokeless tobacco: Not on file      Comment: quit smoking 2012     Alcohol use No    Drug use: No    Sexual activity: Not on file     Other Topics Concern    Not on file     Social History Narrative         Vital Signs Range (Last 24H):         CBC: No results for input(s): WBC, RBC, HGB, HCT, PLT, MCV, MCH, MCHC in the last 72 hours.    CMP: No results for input(s): NA, K, CL, CO2, BUN, CREATININE, GLU, MG, PHOS, CALCIUM, ALBUMIN, PROT, ALKPHOS, ALT, AST, BILITOT in the last 72 hours.    INR  No results for input(s): INR, PROTIME, APTT in the last 72 hours.    Invalid input(s): PT        Diagnostic Studies:      EKG: none on file       2D Echo: none on file       [facility] Anesthesia Evaluation    I have reviewed the Patient Summary Reports.     I have reviewed the Medications.     Review of Systems  Anesthesia Hx:  History of prior surgery of interest to airway management or planning: Previous anesthesia: General Denies Family Hx of Anesthesia complications.  Personal Hx of Anesthesia complications, Post-Operative Nausea/Vomiting, with every anesthetic, treatment not known   Social:  Former Smoker    Cardiovascular:   Denies MI.  Denies CAD.       Pulmonary:   Denies COPD. Sleep Apnea    Hepatic/GI:   Hiatal Hernia, GERD, poorly controlled    Neurological:   Denies CVA.    Endocrine:   Denies Diabetes.    never pretreated    Physical Exam  General:  Obesity    Airway/Jaw/Neck:  Airway Findings: Mouth Opening: Small, but > 3cm Tongue: Large  General Airway Assessment: Adult  Mallampati: III  Improves to II with phonation.  TM Distance: 4 - 6 cm  Jaw/Neck Findings:  Neck ROM: Extension Decreased, Mild      Dental:  Dental Findings: In tact   Chest/Lungs:  Chest/Lungs Findings: Clear to auscultation, Normal Respiratory Rate     Heart/Vascular:  Heart Findings: Rate: Normal  Rhythm: Regular Rhythm  Sounds: Normal             Anesthesia Plan  Type of Anesthesia, risks & benefits discussed:  Anesthesia Type:  general  Patient's Preference:   Intra-op Monitoring Plan: standard ASA  monitors  Intra-op Monitoring Plan Comments:   Post Op Pain Control Plan:   Post Op Pain Control Plan Comments:   Induction:   IV and rapid sequence  Beta Blocker:  Patient is not currently on a Beta-Blocker (No further documentation required).       Informed Consent: Patient understands risks and agrees with Anesthesia plan.  Questions answered. Anesthesia consent signed with patient.  ASA Score: 3     Day of Surgery Review of History & Physical:    H&P update referred to the surgeon.         Ready For Surgery From Anesthesia Perspective.

## 2017-03-03 ENCOUNTER — HOSPITAL ENCOUNTER (INPATIENT)
Facility: HOSPITAL | Age: 37
LOS: 2 days | Discharge: HOME | DRG: 327 | End: 2017-03-05
Attending: SURGERY | Admitting: SURGERY
Payer: COMMERCIAL

## 2017-03-03 ENCOUNTER — SURGERY (OUTPATIENT)
Age: 37
End: 2017-03-03

## 2017-03-03 ENCOUNTER — ANESTHESIA (OUTPATIENT)
Dept: SURGERY | Facility: HOSPITAL | Age: 37
DRG: 327 | End: 2017-03-03
Payer: COMMERCIAL

## 2017-03-03 DIAGNOSIS — K21.9 GASTROESOPHAGEAL REFLUX DISEASE, ESOPHAGITIS PRESENCE NOT SPECIFIED: Primary | ICD-10-CM

## 2017-03-03 DIAGNOSIS — K44.9 HIATAL HERNIA: ICD-10-CM

## 2017-03-03 LAB
B-HCG UR QL: NEGATIVE
CTP QC/QA: YES

## 2017-03-03 PROCEDURE — 25000003 PHARM REV CODE 250: Performed by: SURGERY

## 2017-03-03 PROCEDURE — 11000001 HC ACUTE MED/SURG PRIVATE ROOM

## 2017-03-03 PROCEDURE — 71000039 HC RECOVERY, EACH ADD'L HOUR: Performed by: SURGERY

## 2017-03-03 PROCEDURE — 25000003 PHARM REV CODE 250: Performed by: ANESTHESIOLOGY

## 2017-03-03 PROCEDURE — 37000008 HC ANESTHESIA 1ST 15 MINUTES: Performed by: SURGERY

## 2017-03-03 PROCEDURE — 25000003 PHARM REV CODE 250: Performed by: STUDENT IN AN ORGANIZED HEALTH CARE EDUCATION/TRAINING PROGRAM

## 2017-03-03 PROCEDURE — 63600175 PHARM REV CODE 636 W HCPCS: Performed by: SURGERY

## 2017-03-03 PROCEDURE — 27201423 OPTIME MED/SURG SUP & DEVICES STERILE SUPPLY: Performed by: SURGERY

## 2017-03-03 PROCEDURE — 36000710: Performed by: SURGERY

## 2017-03-03 PROCEDURE — 0DV44ZZ RESTRICTION OF ESOPHAGOGASTRIC JUNCTION, PERCUTANEOUS ENDOSCOPIC APPROACH: ICD-10-PCS | Performed by: SURGERY

## 2017-03-03 PROCEDURE — D9220A PRA ANESTHESIA: Mod: ,,, | Performed by: ANESTHESIOLOGY

## 2017-03-03 PROCEDURE — 36000711: Performed by: SURGERY

## 2017-03-03 PROCEDURE — 0BQS4ZZ REPAIR LEFT DIAPHRAGM, PERCUTANEOUS ENDOSCOPIC APPROACH: ICD-10-PCS | Performed by: SURGERY

## 2017-03-03 PROCEDURE — 0BQR4ZZ REPAIR RIGHT DIAPHRAGM, PERCUTANEOUS ENDOSCOPIC APPROACH: ICD-10-PCS | Performed by: SURGERY

## 2017-03-03 PROCEDURE — 63600175 PHARM REV CODE 636 W HCPCS

## 2017-03-03 PROCEDURE — 37000009 HC ANESTHESIA EA ADD 15 MINS: Performed by: SURGERY

## 2017-03-03 PROCEDURE — 81025 URINE PREGNANCY TEST: CPT | Performed by: ANESTHESIOLOGY

## 2017-03-03 PROCEDURE — 71000033 HC RECOVERY, INTIAL HOUR: Performed by: SURGERY

## 2017-03-03 PROCEDURE — C9113 INJ PANTOPRAZOLE SODIUM, VIA: HCPCS | Performed by: SURGERY

## 2017-03-03 PROCEDURE — 63600175 PHARM REV CODE 636 W HCPCS: Performed by: STUDENT IN AN ORGANIZED HEALTH CARE EDUCATION/TRAINING PROGRAM

## 2017-03-03 PROCEDURE — C1768 GRAFT, VASCULAR: HCPCS | Performed by: SURGERY

## 2017-03-03 PROCEDURE — 43281 LAP PARAESOPHAG HERN REPAIR: CPT | Mod: ,,, | Performed by: SURGERY

## 2017-03-03 DEVICE — FELT THIN 1INX1IN: Type: IMPLANTABLE DEVICE | Site: ABDOMEN | Status: FUNCTIONAL

## 2017-03-03 RX ORDER — POLYETHYLENE GLYCOL 3350 17 G/17G
17 POWDER, FOR SOLUTION ORAL DAILY PRN
Qty: 510 G | Refills: 0 | Status: SHIPPED | OUTPATIENT
Start: 2017-03-03 | End: 2017-05-29

## 2017-03-03 RX ORDER — SODIUM CHLORIDE 9 MG/ML
INJECTION, SOLUTION INTRAVENOUS CONTINUOUS
Status: DISCONTINUED | OUTPATIENT
Start: 2017-03-03 | End: 2017-03-03

## 2017-03-03 RX ORDER — ACETAMINOPHEN 10 MG/ML
1000 INJECTION, SOLUTION INTRAVENOUS EVERY 8 HOURS
Status: DISPENSED | OUTPATIENT
Start: 2017-03-03 | End: 2017-03-04

## 2017-03-03 RX ORDER — HYDROCODONE BITARTRATE AND ACETAMINOPHEN 7.5; 325 MG/15ML; MG/15ML
15 SOLUTION ORAL EVERY 4 HOURS PRN
Status: DISCONTINUED | OUTPATIENT
Start: 2017-03-04 | End: 2017-03-05 | Stop reason: HOSPADM

## 2017-03-03 RX ORDER — DEXAMETHASONE SODIUM PHOSPHATE 4 MG/ML
INJECTION, SOLUTION INTRA-ARTICULAR; INTRALESIONAL; INTRAMUSCULAR; INTRAVENOUS; SOFT TISSUE
Status: DISCONTINUED | OUTPATIENT
Start: 2017-03-03 | End: 2017-03-03

## 2017-03-03 RX ORDER — PROPOFOL 10 MG/ML
VIAL (ML) INTRAVENOUS
Status: DISCONTINUED | OUTPATIENT
Start: 2017-03-03 | End: 2017-03-03

## 2017-03-03 RX ORDER — PHENYLEPHRINE HYDROCHLORIDE 10 MG/ML
INJECTION INTRAVENOUS
Status: DISCONTINUED | OUTPATIENT
Start: 2017-03-03 | End: 2017-03-03

## 2017-03-03 RX ORDER — ONDANSETRON 4 MG/1
4 TABLET, ORALLY DISINTEGRATING ORAL EVERY 8 HOURS PRN
Qty: 30 TABLET | Refills: 0 | Status: SHIPPED | OUTPATIENT
Start: 2017-03-03 | End: 2017-05-29

## 2017-03-03 RX ORDER — NEOSTIGMINE METHYLSULFATE 1 MG/ML
INJECTION, SOLUTION INTRAVENOUS
Status: DISCONTINUED | OUTPATIENT
Start: 2017-03-03 | End: 2017-03-03

## 2017-03-03 RX ORDER — KETAMINE HCL IN 0.9 % NACL 50 MG/5 ML
SYRINGE (ML) INTRAVENOUS
Status: DISCONTINUED | OUTPATIENT
Start: 2017-03-03 | End: 2017-03-03

## 2017-03-03 RX ORDER — SUCCINYLCHOLINE CHLORIDE 20 MG/ML
INJECTION INTRAMUSCULAR; INTRAVENOUS
Status: DISCONTINUED | OUTPATIENT
Start: 2017-03-03 | End: 2017-03-03

## 2017-03-03 RX ORDER — SCOLOPAMINE TRANSDERMAL SYSTEM 1 MG/1
1 PATCH, EXTENDED RELEASE TRANSDERMAL ONCE
Status: DISCONTINUED | OUTPATIENT
Start: 2017-03-03 | End: 2017-03-03

## 2017-03-03 RX ORDER — LIDOCAINE HCL/PF 100 MG/5ML
SYRINGE (ML) INTRAVENOUS
Status: DISCONTINUED | OUTPATIENT
Start: 2017-03-03 | End: 2017-03-03

## 2017-03-03 RX ORDER — FAMOTIDINE 10 MG/ML
INJECTION INTRAVENOUS
Status: DISCONTINUED | OUTPATIENT
Start: 2017-03-03 | End: 2017-03-03

## 2017-03-03 RX ORDER — OMEPRAZOLE 20 MG/1
20 CAPSULE, DELAYED RELEASE ORAL DAILY
Qty: 30 CAPSULE | Refills: 11 | Status: SHIPPED | OUTPATIENT
Start: 2017-03-03 | End: 2017-04-27

## 2017-03-03 RX ORDER — PROMETHAZINE HYDROCHLORIDE 25 MG/1
25 SUPPOSITORY RECTAL EVERY 6 HOURS PRN
Qty: 15 SUPPOSITORY | Refills: 0 | Status: SHIPPED | OUTPATIENT
Start: 2017-03-03 | End: 2017-05-29

## 2017-03-03 RX ORDER — ONDANSETRON 2 MG/ML
INJECTION INTRAMUSCULAR; INTRAVENOUS
Status: DISCONTINUED | OUTPATIENT
Start: 2017-03-03 | End: 2017-03-03

## 2017-03-03 RX ORDER — GLYCOPYRROLATE 0.2 MG/ML
INJECTION INTRAMUSCULAR; INTRAVENOUS
Status: DISCONTINUED | OUTPATIENT
Start: 2017-03-03 | End: 2017-03-03

## 2017-03-03 RX ORDER — HYDROMORPHONE HCL IN 0.9% NACL 6 MG/30 ML
PATIENT CONTROLLED ANALGESIA SYRINGE INTRAVENOUS CONTINUOUS
Status: DISCONTINUED | OUTPATIENT
Start: 2017-03-03 | End: 2017-03-05

## 2017-03-03 RX ORDER — SODIUM CHLORIDE 9 MG/ML
INJECTION, SOLUTION INTRAVENOUS CONTINUOUS
Status: DISCONTINUED | OUTPATIENT
Start: 2017-03-03 | End: 2017-03-05

## 2017-03-03 RX ORDER — FENTANYL CITRATE 50 UG/ML
INJECTION, SOLUTION INTRAMUSCULAR; INTRAVENOUS
Status: DISCONTINUED | OUTPATIENT
Start: 2017-03-03 | End: 2017-03-03

## 2017-03-03 RX ORDER — FENTANYL CITRATE 50 UG/ML
25 INJECTION, SOLUTION INTRAMUSCULAR; INTRAVENOUS EVERY 5 MIN PRN
Status: DISCONTINUED | OUTPATIENT
Start: 2017-03-03 | End: 2017-03-03 | Stop reason: HOSPADM

## 2017-03-03 RX ORDER — HYDROCODONE BITARTRATE AND ACETAMINOPHEN 7.5; 325 MG/15ML; MG/15ML
15 SOLUTION ORAL 4 TIMES DAILY PRN
Qty: 473 ML | Refills: 0 | Status: SHIPPED | OUTPATIENT
Start: 2017-03-03 | End: 2017-05-29

## 2017-03-03 RX ORDER — NALOXONE HCL 0.4 MG/ML
0.02 VIAL (ML) INJECTION
Status: DISCONTINUED | OUTPATIENT
Start: 2017-03-03 | End: 2017-03-05 | Stop reason: HOSPADM

## 2017-03-03 RX ORDER — SODIUM CHLORIDE 0.9 % (FLUSH) 0.9 %
3 SYRINGE (ML) INJECTION
Status: DISCONTINUED | OUTPATIENT
Start: 2017-03-03 | End: 2017-03-03 | Stop reason: HOSPADM

## 2017-03-03 RX ORDER — BUPIVACAINE HYDROCHLORIDE 2.5 MG/ML
INJECTION, SOLUTION EPIDURAL; INFILTRATION; INTRACAUDAL
Status: DISCONTINUED | OUTPATIENT
Start: 2017-03-03 | End: 2017-03-03

## 2017-03-03 RX ORDER — DEXMEDETOMIDINE HYDROCHLORIDE 100 UG/ML
INJECTION, SOLUTION INTRAVENOUS
Status: DISCONTINUED | OUTPATIENT
Start: 2017-03-03 | End: 2017-03-03

## 2017-03-03 RX ORDER — METOCLOPRAMIDE HYDROCHLORIDE 5 MG/ML
INJECTION INTRAMUSCULAR; INTRAVENOUS
Status: DISCONTINUED | OUTPATIENT
Start: 2017-03-03 | End: 2017-03-03

## 2017-03-03 RX ORDER — ACETAMINOPHEN 10 MG/ML
INJECTION, SOLUTION INTRAVENOUS
Status: DISCONTINUED | OUTPATIENT
Start: 2017-03-03 | End: 2017-03-03

## 2017-03-03 RX ORDER — MIDAZOLAM HYDROCHLORIDE 1 MG/ML
INJECTION, SOLUTION INTRAMUSCULAR; INTRAVENOUS
Status: DISCONTINUED | OUTPATIENT
Start: 2017-03-03 | End: 2017-03-03

## 2017-03-03 RX ORDER — PANTOPRAZOLE SODIUM 40 MG/10ML
40 INJECTION, POWDER, LYOPHILIZED, FOR SOLUTION INTRAVENOUS 2 TIMES DAILY
Status: DISCONTINUED | OUTPATIENT
Start: 2017-03-03 | End: 2017-03-05 | Stop reason: HOSPADM

## 2017-03-03 RX ORDER — ROCURONIUM BROMIDE 10 MG/ML
INJECTION, SOLUTION INTRAVENOUS
Status: DISCONTINUED | OUTPATIENT
Start: 2017-03-03 | End: 2017-03-03

## 2017-03-03 RX ORDER — ONDANSETRON 2 MG/ML
4 INJECTION INTRAMUSCULAR; INTRAVENOUS EVERY 6 HOURS PRN
Status: DISCONTINUED | OUTPATIENT
Start: 2017-03-03 | End: 2017-03-04

## 2017-03-03 RX ORDER — LIDOCAINE HYDROCHLORIDE 10 MG/ML
1 INJECTION, SOLUTION EPIDURAL; INFILTRATION; INTRACAUDAL; PERINEURAL ONCE
Status: COMPLETED | OUTPATIENT
Start: 2017-03-03 | End: 2017-03-03

## 2017-03-03 RX ORDER — HYDROMORPHONE HCL IN 0.9% NACL 6 MG/30 ML
PATIENT CONTROLLED ANALGESIA SYRINGE INTRAVENOUS
Status: COMPLETED
Start: 2017-03-03 | End: 2017-03-03

## 2017-03-03 RX ADMIN — FENTANYL CITRATE 50 MCG: 50 INJECTION, SOLUTION INTRAMUSCULAR; INTRAVENOUS at 11:03

## 2017-03-03 RX ADMIN — NEOSTIGMINE METHYLSULFATE 5 MG: 1 INJECTION INTRAVENOUS at 01:03

## 2017-03-03 RX ADMIN — MIDAZOLAM HYDROCHLORIDE 2 MG: 1 INJECTION, SOLUTION INTRAMUSCULAR; INTRAVENOUS at 11:03

## 2017-03-03 RX ADMIN — ACETAMINOPHEN 1000 MG: 10 INJECTION, SOLUTION INTRAVENOUS at 01:03

## 2017-03-03 RX ADMIN — SODIUM CHLORIDE, SODIUM GLUCONATE, SODIUM ACETATE, POTASSIUM CHLORIDE, MAGNESIUM CHLORIDE, SODIUM PHOSPHATE, DIBASIC, AND POTASSIUM PHOSPHATE: .53; .5; .37; .037; .03; .012; .00082 INJECTION, SOLUTION INTRAVENOUS at 11:03

## 2017-03-03 RX ADMIN — LIDOCAINE HYDROCHLORIDE 0.2 MG: 10 INJECTION, SOLUTION EPIDURAL; INFILTRATION; INTRACAUDAL; PERINEURAL at 10:03

## 2017-03-03 RX ADMIN — DEXMEDETOMIDINE HYDROCHLORIDE 50 MCG: 100 INJECTION, SOLUTION, CONCENTRATE INTRAVENOUS at 01:03

## 2017-03-03 RX ADMIN — PHENYLEPHRINE HYDROCHLORIDE 200 MCG: 10 INJECTION INTRAVENOUS at 12:03

## 2017-03-03 RX ADMIN — PHENYLEPHRINE HYDROCHLORIDE 100 MCG: 10 INJECTION INTRAVENOUS at 12:03

## 2017-03-03 RX ADMIN — DEXAMETHASONE SODIUM PHOSPHATE 8 MG: 4 INJECTION, SOLUTION INTRAMUSCULAR; INTRAVENOUS at 11:03

## 2017-03-03 RX ADMIN — ACETAMINOPHEN 1000 MG: 10 INJECTION, SOLUTION INTRAVENOUS at 09:03

## 2017-03-03 RX ADMIN — ROCURONIUM BROMIDE 10 MG: 10 INJECTION, SOLUTION INTRAVENOUS at 12:03

## 2017-03-03 RX ADMIN — Medication 20 MG: at 01:03

## 2017-03-03 RX ADMIN — Medication: at 09:03

## 2017-03-03 RX ADMIN — CEFAZOLIN SODIUM 150 ML: 2 SOLUTION INTRAVENOUS at 12:03

## 2017-03-03 RX ADMIN — ONDANSETRON 4 MG: 2 INJECTION INTRAMUSCULAR; INTRAVENOUS at 11:03

## 2017-03-03 RX ADMIN — SODIUM CHLORIDE: 0.9 INJECTION, SOLUTION INTRAVENOUS at 10:03

## 2017-03-03 RX ADMIN — GLYCOPYRROLATE 0.6 MG: 0.2 INJECTION, SOLUTION INTRAMUSCULAR; INTRAVENOUS at 01:03

## 2017-03-03 RX ADMIN — SODIUM CHLORIDE: 0.9 INJECTION, SOLUTION INTRAVENOUS at 02:03

## 2017-03-03 RX ADMIN — BUPIVACAINE HYDROCHLORIDE 7 ML: 2.5 INJECTION, SOLUTION EPIDURAL; INFILTRATION; INTRACAUDAL; PERINEURAL at 01:03

## 2017-03-03 RX ADMIN — LIDOCAINE HYDROCHLORIDE 100 MG: 20 INJECTION, SOLUTION INTRAVENOUS at 11:03

## 2017-03-03 RX ADMIN — Medication: at 02:03

## 2017-03-03 RX ADMIN — METOCLOPRAMIDE 10 MG: 5 INJECTION, SOLUTION INTRAMUSCULAR; INTRAVENOUS at 11:03

## 2017-03-03 RX ADMIN — ONDANSETRON 4 MG: 2 INJECTION INTRAMUSCULAR; INTRAVENOUS at 08:03

## 2017-03-03 RX ADMIN — SUCCINYLCHOLINE CHLORIDE 120 MG: 20 INJECTION, SOLUTION INTRAMUSCULAR; INTRAVENOUS at 11:03

## 2017-03-03 RX ADMIN — PHENYLEPHRINE HYDROCHLORIDE 200 MCG: 10 INJECTION INTRAVENOUS at 01:03

## 2017-03-03 RX ADMIN — FAMOTIDINE 20 MG: 10 INJECTION, SOLUTION INTRAVENOUS at 11:03

## 2017-03-03 RX ADMIN — DEXMEDETOMIDINE HYDROCHLORIDE 50 MCG: 100 INJECTION, SOLUTION, CONCENTRATE INTRAVENOUS at 12:03

## 2017-03-03 RX ADMIN — ROCURONIUM BROMIDE 5 MG: 10 INJECTION, SOLUTION INTRAVENOUS at 11:03

## 2017-03-03 RX ADMIN — PROPOFOL 200 MG: 10 INJECTION, EMULSION INTRAVENOUS at 11:03

## 2017-03-03 RX ADMIN — PANTOPRAZOLE SODIUM 40 MG: 40 INJECTION, POWDER, FOR SOLUTION INTRAVENOUS at 08:03

## 2017-03-03 RX ADMIN — FENTANYL CITRATE 50 MCG: 50 INJECTION, SOLUTION INTRAMUSCULAR; INTRAVENOUS at 01:03

## 2017-03-03 RX ADMIN — GLYCOPYRROLATE 0.2 MG: 0.2 INJECTION, SOLUTION INTRAMUSCULAR; INTRAVENOUS at 11:03

## 2017-03-03 NOTE — NURSING TRANSFER
Nursing Transfer Note      3/3/2017     Transfer To: [address]    Transfer via stretcher    Transfer with IV Fluids, PCA pump    Transported by PCT    Medicines sent: None    Chart send with patient: Yes    Notified: Pt mother and father at bedside     Patient reassessed at: 3/3/16 1618    Upon arrival to floor: patient oriented to room, call bell in reach and bed in lowest position

## 2017-03-03 NOTE — INTERVAL H&P NOTE
The patient has been examined and the H&P has been reviewed:    I concur with the findings and no changes have occurred since H&P was written.     Past Medical History:   Diagnosis Date    Apnea, sleep     Arthritis     Bipolar 1 disorder     Clotting disorder 2006    Factor 5    GERD (gastroesophageal reflux disease)     Low back pain due to displacement of intervertebral disc     Morbid obesity with BMI of 45.0-49.9, adult        Past Surgical History:   Procedure Laterality Date    ANTERIOR CERVICAL DISCECTOMY W/ FUSION  2016    ESOPHAGOGASTRODUODENOSCOPY      TONSILLECTOMY  2007       History reviewed. No pertinent family history.    Social History     Social History    Marital status: Single     Spouse name: N/A    Number of children: N/A    Years of education: N/A     Social History Main Topics    Smoking status: Former Smoker     Quit date: 2/13/2012    Smokeless tobacco: None      Comment: quit smoking 2012    Alcohol use No    Drug use: No    Sexual activity: Not Asked     Other Topics Concern    None     Social History Narrative       Current Facility-Administered Medications   Medication Dose Route Frequency Provider Last Rate Last Dose    0.9%  NaCl infusion   Intravenous Continuous Jonathon Peck MD        ceFAZolin (ANCEF) 3 gram in dextrose 5% IVPB  3 g Intravenous On Call Procedure Jonathon Peck MD        lidocaine (PF) 10 mg/ml (1%) injection 10 mg  1 mL Intradermal Once Darcie Schaefer MD           Review of patient's allergies indicates:   Allergen Reactions    Percocet [oxycodone-acetaminophen] Nausea And Vomiting    Sulfa (sulfonamide antibiotics) Rash         Anesthesia/Surgery risks, benefits and alternative options discussed and understood by patient/family.          Active Hospital Problems    Diagnosis  POA    Hiatal hernia [K44.9]  Yes      Resolved Hospital Problems    Diagnosis Date Resolved POA   No resolved problems to display.

## 2017-03-03 NOTE — BRIEF OP NOTE
Operative Note       Surgery Date: 3/3/2017     Surgeon(s) and Role:     * Jonathon Peck MD - Primary     * Randy Ku MD - Resident - Assisting    Pre-op Diagnosis:  Gastroesophageal reflux disease without esophagitis [K21.9]    Post-op Diagnosis:  Gastroesophageal reflux disease without esophagitis [K21.9]    Procedure(s) (LRB):  laproscopic REPAIR HERNIA LAPAROSCOPIC HIATAL   (N/A)  [id number] (N/A)    Anesthesia: General    Procedure in Detail/Findings:  hh and toupet without apparent complication    Estimated Blood Loss: Minimal           Specimens     None        Implants:   Implant Name Type Inv. Item Serial No.  Lot No. LRB No. Used   FELT THIN [id number]  [id number]   FEL[employer]IN [id number]   [facility] [id number] N/A 1              Disposition: PACU - hemodynamically stable.           Condition: Good    Attestation:  I was present and scrubbed for the entire procedure.

## 2017-03-03 NOTE — PROGRESS NOTES
Patient mother and father at bedside. Updated on patient status.  Patient room assignment of 540B given to mother and father. No concerns noted at this time.

## 2017-03-03 NOTE — IP AVS SNAPSHOT
[facility]  [address]  [facility] [address]  Phone: [phone]           Patient Discharge Instructions     Our goal is to set you up for success. This packet includes information on your condition, medications, and your home care. It will help you to care for yourself so you don't get sicker and need to go back to the hospital.     Please ask your nurse if you have any questions.        There are many details to remember when preparing to leave the hospital. Here is what you will need to do:    1. Take your medicine. If you are prescribed medications, review your Medication List in the following pages. You may have new medications to  at the pharmacy and others that you'll need to stop taking. Review the instructions for how and when to take your medications. Talk with your doctor or nurses if you are unsure of what to do.     2. Go to your follow-up appointments. Specific follow-up information is listed in the following pages. Your may be contacted by a transition nurse or clinical provider about future appointments. Be sure we have all of the phone numbers to reach you, if needed. Please contact your provider's office if you are unable to make an appointment.     3. Watch for warning signs. Your doctor or nurse will give you detailed warning signs to watch for and when to call for assistance. These instructions may also include educational information about your condition. If you experience any of warning signs to your health, call your doctor.               [facility]  Unless otherwise directed by your provider, please contact [facility], our nurse care line that is available for 24/7 assistance.     [phone] (toll-free)    Registered nurses in the [facility] provide clinical advisement, health education, appointment booking, and other advisory services.                    ** Verify the list of medication(s) below is accurate and up  to date. Carry this with you in case of emergency. If your medications have changed, please notify your healthcare provider.             Medication List      START taking these medications        Additional Info                      hydrocodone-apap 2.5-108 MG/5 ML oral solution   Commonly known as:  HYCET   Quantity:  473 mL   Refills:  0   Dose:  15 mL    Last time this was given:  15 mLs on 3/4/2017  6:09 PM   Instructions:  Take 15 mLs by mouth 4 (four) times daily as needed for Pain.     Begin Date    AM    Noon    PM    Bedtime       omeprazole 20 MG capsule   Commonly known as:  PRILOSEC   Quantity:  30 capsule   Refills:  11   Dose:  20 mg    Instructions:  Take 1 capsule (20 mg total) by mouth once daily.     Begin Date    AM    Noon    PM    Bedtime       ondansetron 4 MG Tbdl   Commonly known as:  ZOFRAN-ODT   Quantity:  30 tablet   Refills:  0   Dose:  4 mg    Instructions:  Take 1 tablet (4 mg total) by mouth every 8 (eight) hours as needed (nausea).     Begin Date    AM    Noon    PM    Bedtime       polyethylene glycol 17 gram/dose powder   Commonly known as:  GLYCOLAX   Quantity:  510 g   Refills:  0   Dose:  17 g    Instructions:  Take 17 g by mouth daily as needed (constipation).     Begin Date    AM    Noon    PM    Bedtime       promethazine 25 MG suppository   Commonly known as:  PHENERGAN   Quantity:  15 suppository   Refills:  0   Dose:  25 mg    Instructions:  Place 1 suppository (25 mg total) rectally every 6 (six) hours as needed for Nausea.     Begin Date    AM    Noon    PM    Bedtime         CONTINUE taking these medications        Additional Info                      buPROPion 200 MG Tbsr   Commonly known as:  WELLBUTRIN SR   Refills:  0   Dose:  200 mg    Instructions:  200 mg 2 (two) times daily.     Begin Date    AM    Noon    PM    Bedtime       lamotrigine 150 MG Tab   Commonly known as:  LAMICTAL   Refills:  0   Dose:  150 mg    Instructions:  150 mg 2 (two) times daily.     Begin  Date    AM    Noon    PM    Bedtime       pantoprazole 40 MG tablet   Commonly known as:  PROTONIX   Refills:  0   Dose:  40 mg    Instructions:  Take 40 mg by mouth once daily.     Begin Date    AM    Noon    PM    Bedtime       SAPHRIS (BLACK CHERRY) 5 mg Subl   Refills:  0   Dose:  5 mg   Generic drug:  asenapine    Instructions:  5 mg once daily.     Begin Date    AM    Noon    PM    Bedtime       tramadol 50 mg tablet   Commonly known as:  ULTRAM   Refills:  0   Dose:  50 mg    Instructions:  50 mg daily as needed. One or two tablets PRN     Begin Date    AM    Noon    PM    Bedtime       triamcinolone acetonide 0.1% 0.1 % cream   Commonly known as:  KENALOG   Refills:  0      Begin Date    AM    Noon    PM    Bedtime       TRINTELLIX 10 mg Tab   Refills:  0   Dose:  10 mg   Generic drug:  vortioxetine    Instructions:  10 mg once daily.     Begin Date    AM    Noon    PM    Bedtime            Where to Get Your Medications      You can get these medications from any pharmacy     Bring a paper prescription for each of these medications     hydrocodone-apap 2.5-108 MG/5 ML oral solution    omeprazole 20 MG capsule    ondansetron 4 MG Tbdl    polyethylene glycol 17 gram/dose powder    promethazine 25 MG suppository                  Please bring to all follow up appointments:    1. A copy of your discharge instructions.  2. All medicines you are currently taking in their original bottles.  3. Identification and insurance card.    Please arrive 15 minutes ahead of scheduled appointment time.    Please call 24 hours in advance if you must reschedule your appointment and/or time.        Your Scheduled Appointments     Mar 16, 2017  7:00 AM CDT   Post OP with Jonathon Peck MD   [facility] - General Surgery ([facility] )    [address]  [place] [id number] [phone]              Follow-up Information     Follow up with Jonathon Peck MD In 2 weeks.    Specialties:  General Surgery,  Bariatrics    Why:  For wound re-check    Contact information:    [address]  [facility] [address]  [phone]          Discharge Instructions     Future Orders    Activity as tolerated     Call MD for:  difficulty breathing or increased cough     Call MD for:  persistent nausea and vomiting or diarrhea     Call MD for:  redness, tenderness, or signs of infection (pain, swelling, redness, odor or green/yellow discharge around incision site)     Call MD for:  severe uncontrolled pain     Call MD for:  temperature >100.4     Diet general     Questions:    Total calories:      Fat restriction, if any:      Protein restriction, if any:      Na restriction, if any:      Fluid restriction:      Additional restrictions:      Lifting restrictions     Comments:    No heavy lifting > 10 lbs until return to clinic.    No dressing needed     Comments:    Shower 48 hours after surgery. Do not remove paper strips over wounds; will either fall off in shower or be removed when return to clinic. Do not soak in bath tub or swim.        Primary Diagnosis     Your primary diagnosis was:  Diaphragmatic Hernia      Admission Information     Date & Time Provider Department [facility]    3/3/2017  9:52 AM Jonathon Peck MD [facility] [id number]      Care Providers     Provider Role Specialty Primary office phone    Jonathon Peck MD Attending Provider General Surgery [phone]    Jonathon Peck MD Surgeon  General Surgery [phone]      Your Vitals Were     BP Pulse Temp Resp Height Weight    136/76 (BP Location: Left arm, Patient Position: Lying, BP Method: Automatic) 97 97.8 °F (36.6 °C) (Temporal) 16 5' (1.524 m) 104.3 kg (230 lb)    Last Period SpO2 BMI          02/06/2017 (Exact Date) 97% 44.92 kg/m2        Recent Lab Values     No lab values to display.      Pending Labs     Order Current Status    Basic metabolic panel In process    CBC auto differential In process    Magnesium In  process    Phosphorus In process      Allergies as of 3/5/2017        Reactions    Percocet [Oxycodone-acetaminophen] Nausea And Vomiting    Sulfa (Sulfonamide Antibiotics) Rash      Advance Directives     An advance directive is a document which, in the event you are no longer able to make decisions for yourself, tells your healthcare team what kind of treatment you do or do not want to receive, or who you would like to make those decisions for you.  If you do not currently have an advance directive, [facility] encourages you to create one.  For more information call:  [phone]), [phone] ([phone]),  or log on to [email or web address].        Language Assistance Services     ATTENTION: Language assistance services are available, free of charge. Please call [phone].      ATENCIÓN: Si habla español, tiene a pereira disposición servicios gratuitos de asistencia lingüística. Llame al [phone].     CHÚ Ý: N?u b?n nói Ti?ng Vi?t, có các d?ch v? h? tr? ngôn ng? mi?n phí dành cho b?n. G?i s? [phone].         [facility] complies with applicable Federal civil rights laws and does not discriminate on the basis of race, color, national origin, age, disability, or sex.

## 2017-03-03 NOTE — OP NOTE
Surgery Date: 3/3/2017     Surgeon(s) and Role:     * Jonathon Peck MD - Primary     * Randy Ku MD - Resident - Assisting    Pre-op Diagnosis:  Gastroesophageal reflux disease without esophagitis [K21.9]    Post-op Diagnosis:  Gastroesophageal reflux disease without esophagitis [K21.9]    Procedure(s) (LRB):  laproscopic REPAIR HERNIA LAPAROSCOPIC HIATAL   (N/A)  [id number] (N/A)    Anesthesia: General      PROCEDURE IN DETAIL: The patient was placed under general anesthesia on the   marquet bed. The legs were abducted. The abdomen was prepped and draped in the  usual manner. Access to peritoneum was gained 15 cm below the xiphoid through the umbilicus using a 10mm  Optiview trocar under direct vision. Pneumoperitoneum to 15 mmHg with CO2 gas   was obtained. Four 5-mm trocars were placed medially, subcostally at 11 and 20   cm on the left side and 7 and 15 cm on the right side. A liver retractor was   placed exposing the esophageal hiatus.  The hernia was small. Using judicial use of the Harmonic   scalpel and blunt dissection the hernia sac was divided from the crura and brought into the abdomen.  It was not resected (from the left of the anterior vagus to the posterior vagus making sure to preserve these strutures and the specimen was removed at the end of the case).  This took 0 minutes longer than the typical hiatal hernia.  The greater curve was taken down with the harmonic scalpel from a third of the way from the LES to the base of the left lia and taking all posterior attachements.  After dissection 3 to 4 cm of esophagus laying within the abdominal cavity without   tension. The crural opening was small and was reapproximated with 2  0 neurolon plegeted sutures.  A bio a mesh was not sutured over the closure.  The fundus of the stomach was brought around the esophagus posteriorly and we checked for the angle of His and performed the shoe-shine maneuver.  A 36 dilator  passed easily and the fundoplication was performed around a 52 dilator. A Toupet fundoplication was formed around the dilator using 3X2-0 Neurolon sutures on each side making a 270 degree 2 cm wrap.  Care was used not to suture around the vagus nerve.  .  The vagus nerves were preserved with the esophagus.  A grasper could just be placed between the fundoplication and esophagus.  The dilator was removed and the abdomen was inspected for hemostasis. The liver retractor was removed. The trocars were removed under direct vision. Prior to removing the last trocar, pneumoperitoneum was allowed to escape. The skin incisions were closed with 4-0 plain catgut and reinforced with Mastisol, Steri-Strips, and Band-Aids. The patient tolerated the procedurewell and was brought to Recovery Room in stable condition. Sponge and needle counts were correct at the end of the case.    Pathology: none Complication: none  Blood loss: minimal

## 2017-03-03 NOTE — ANESTHESIA POSTPROCEDURE EVALUATION
Anesthesia Post Evaluation    Patient: Sarah Ulloa    Procedure(s) Performed: Procedure(s) (LRB):  laproscopic REPAIR HERNIA LAPAROSCOPIC HIATAL   (N/A)  [id number] (N/A)    Final Anesthesia Type: general  Patient location during evaluation: PACU  Patient participation: Yes- Able to Participate  Level of consciousness: awake and alert and oriented  Post-procedure vital signs: reviewed and stable  Pain management: adequate  Airway patency: patent  PONV status at discharge: No PONV  Anesthetic complications: no      Cardiovascular status: blood pressure returned to baseline and hemodynamically stable  Respiratory status: spontaneous ventilation, unassisted and room air  Hydration status: euvolemic  Follow-up not needed.        Visit Vitals    BP (!) 103/58    Pulse 81    Temp 37.3 °C (99.1 °F) (Axillary)    Resp 12    Ht 5' (1.524 m)    Wt 104.3 kg (230 lb)    LMP 02/06/2017 (Exact Date)    SpO2 100%    Breastfeeding No    BMI 44.92 kg/m2       Pain/Marely Score: Pain Assessment Performed: Yes (3/3/2017  3:18 PM)  Presence of Pain: complains of pain/discomfort (3/3/2017  3:18 PM)  Pain Rating Prior to Med Admin: 0 (3/3/2017  2:49 PM)  Marely Score: 10 (3/3/2017  3:18 PM)

## 2017-03-03 NOTE — ANESTHESIA RELEASE NOTE
Anesthesia Release from PACU Note    Patient: Sarah Ulloa    Procedure(s) Performed: Procedure(s) (LRB):  laproscopic REPAIR HERNIA LAPAROSCOPIC HIATAL   (N/A)  [id number] (N/A)    Anesthesia type: general    Post pain: Adequate analgesia    Post assessment: no apparent anesthetic complications, tolerated procedure well and no evidence of recall    Last Vitals:   Visit Vitals    BP (!) 103/58    Pulse 81    Temp 37.3 °C (99.1 °F) (Axillary)    Resp 12    Ht 5' (1.524 m)    Wt 104.3 kg (230 lb)    LMP 02/06/2017 (Exact Date)    SpO2 100%    Breastfeeding No    BMI 44.92 kg/m2       Post vital signs: stable    Level of consciousness: awake, alert  and oriented    Nausea/Vomiting: no nausea/no vomiting    Complications: none    Airway Patency: patent    Respiratory: unassisted, spontaneous ventilation, room air    Cardiovascular: stable and blood pressure at baseline    Hydration: euvolemic

## 2017-03-03 NOTE — PROGRESS NOTES
Patient a difficult IV start. Dr. Schaefer aware that we were only able to start a 22G. Verbalized understanding.    Patient awake and alert. Voice no complaints. No s/s of distress noted. Mom and dad at bedside. All questions addressed. Verbalized understanding.

## 2017-03-03 NOTE — TRANSFER OF CARE
Anesthesia Transfer of Care Note    Patient: Sarah Ulloa    Procedure(s) Performed: Procedure(s) (LRB):  laproscopic REPAIR HERNIA LAPAROSCOPIC HIATAL   (N/A)  [id number] (N/A)    Patient location: PACU    Anesthesia Type: general    Transport from OR: Transported from OR on 6-10 L/min O2 by face mask with adequate spontaneous ventilation    Post pain: adequate analgesia    Post assessment: no apparent anesthetic complications    Post vital signs: stable    Level of consciousness: awake    Nausea/Vomiting: no nausea/vomiting    Complications: none          Last vitals:   Visit Vitals    BP (!) 119/58 (BP Location: Left arm, Patient Position: Lying, BP Method: Automatic)    Pulse 88    Temp 37.3 °C (99.1 °F) (Axillary)    Resp 16    Ht 5' (1.524 m)    Wt 104.3 kg (230 lb)    LMP 02/06/2017 (Exact Date)    SpO2 96%    Breastfeeding No    BMI 44.92 kg/m2

## 2017-03-03 NOTE — H&P (VIEW-ONLY)
Subjective:       Patient ID: Sarah Ulloa is a 36 y.o. female.    Chief Complaint: Follow-up (sign consents )    HPI GERD and BMI 44.  She says the ph probe was horrible due to getting off her reflux medications.  Review of Systems   Constitutional: Negative for fever.   Respiratory: Negative for chest tightness and shortness of breath.    Cardiovascular: Negative for chest pain.   Gastrointestinal: Negative for constipation, diarrhea, nausea and vomiting.   Genitourinary: Negative for difficulty urinating and dysuria.   Hematological: Does not bruise/bleed easily.       Objective:    egd, ph and motility reviewed  Physical Exam   Constitutional: She is oriented to person, place, and time. She appears well-developed and well-nourished.   Cardiovascular: Normal rate, regular rhythm and normal heart sounds.    Pulmonary/Chest: Effort normal and breath sounds normal.   Abdominal: Soft. Bowel sounds are normal. There is no tenderness.   Neurological: She is alert and oriented to person, place, and time.   Skin: Skin is warm and dry.   Psychiatric: She has a normal mood and affect. Her behavior is normal. Judgment and thought content normal.   Vitals reviewed.      Assessment:       1. Gastroesophageal reflux disease without esophagitis    2. BMI 45.0-49.9, adult        Plan:       Lap hh with Toupet fundoplication

## 2017-03-04 LAB
ANION GAP SERPL CALC-SCNC: 7 MMOL/L
BASOPHILS # BLD AUTO: 0.01 K/UL
BASOPHILS NFR BLD: 0.1 %
BUN SERPL-MCNC: 8 MG/DL
CALCIUM SERPL-MCNC: 7.9 MG/DL
CHLORIDE SERPL-SCNC: 109 MMOL/L
CO2 SERPL-SCNC: 22 MMOL/L
CREAT SERPL-MCNC: 0.8 MG/DL
DIFFERENTIAL METHOD: ABNORMAL
EOSINOPHIL # BLD AUTO: 0 K/UL
EOSINOPHIL NFR BLD: 0.1 %
ERYTHROCYTE [DISTWIDTH] IN BLOOD BY AUTOMATED COUNT: 15.6 %
EST. GFR  (AFRICAN AMERICAN): >60 ML/MIN/1.73 M^2
EST. GFR  (NON AFRICAN AMERICAN): >60 ML/MIN/1.73 M^2
GLUCOSE SERPL-MCNC: 92 MG/DL
HCT VFR BLD AUTO: 32.8 %
HGB BLD-MCNC: 10.5 G/DL
LYMPHOCYTES # BLD AUTO: 1.7 K/UL
LYMPHOCYTES NFR BLD: 12.1 %
MAGNESIUM SERPL-MCNC: 2.1 MG/DL
MCH RBC QN AUTO: 27.1 PG
MCHC RBC AUTO-ENTMCNC: 32 %
MCV RBC AUTO: 85 FL
MONOCYTES # BLD AUTO: 0.7 K/UL
MONOCYTES NFR BLD: 4.9 %
NEUTROPHILS # BLD AUTO: 11.8 K/UL
NEUTROPHILS NFR BLD: 82.8 %
PHOSPHATE SERPL-MCNC: 3.6 MG/DL
PLATELET # BLD AUTO: 217 K/UL
PMV BLD AUTO: 9.8 FL
POTASSIUM SERPL-SCNC: 4.3 MMOL/L
RBC # BLD AUTO: 3.88 M/UL
SODIUM SERPL-SCNC: 138 MMOL/L
WBC # BLD AUTO: 14.25 K/UL

## 2017-03-04 PROCEDURE — 25000003 PHARM REV CODE 250: Performed by: SURGERY

## 2017-03-04 PROCEDURE — 36415 COLL VENOUS BLD VENIPUNCTURE: CPT

## 2017-03-04 PROCEDURE — 83735 ASSAY OF MAGNESIUM: CPT

## 2017-03-04 PROCEDURE — 84100 ASSAY OF PHOSPHORUS: CPT

## 2017-03-04 PROCEDURE — 63600175 PHARM REV CODE 636 W HCPCS: Performed by: SURGERY

## 2017-03-04 PROCEDURE — C9113 INJ PANTOPRAZOLE SODIUM, VIA: HCPCS | Performed by: SURGERY

## 2017-03-04 PROCEDURE — 80048 BASIC METABOLIC PNL TOTAL CA: CPT

## 2017-03-04 PROCEDURE — 85025 COMPLETE CBC W/AUTO DIFF WBC: CPT

## 2017-03-04 PROCEDURE — 11000001 HC ACUTE MED/SURG PRIVATE ROOM

## 2017-03-04 RX ORDER — ENOXAPARIN SODIUM 100 MG/ML
40 INJECTION SUBCUTANEOUS EVERY 24 HOURS
Status: DISCONTINUED | OUTPATIENT
Start: 2017-03-04 | End: 2017-03-05 | Stop reason: HOSPADM

## 2017-03-04 RX ORDER — ONDANSETRON 2 MG/ML
4 INJECTION INTRAMUSCULAR; INTRAVENOUS EVERY 6 HOURS
Status: DISCONTINUED | OUTPATIENT
Start: 2017-03-04 | End: 2017-03-05 | Stop reason: HOSPADM

## 2017-03-04 RX ADMIN — ONDANSETRON 4 MG: 2 INJECTION INTRAMUSCULAR; INTRAVENOUS at 06:03

## 2017-03-04 RX ADMIN — PANTOPRAZOLE SODIUM 40 MG: 40 INJECTION, POWDER, FOR SOLUTION INTRAVENOUS at 08:03

## 2017-03-04 RX ADMIN — ONDANSETRON 4 MG: 2 INJECTION INTRAMUSCULAR; INTRAVENOUS at 03:03

## 2017-03-04 RX ADMIN — HYDROCODONE BITARTRATE AND ACETAMINOPHEN 15 ML: 7.5; 325 SOLUTION ORAL at 06:03

## 2017-03-04 RX ADMIN — ENOXAPARIN SODIUM 40 MG: 100 INJECTION SUBCUTANEOUS at 06:03

## 2017-03-04 RX ADMIN — ACETAMINOPHEN 1000 MG: 10 INJECTION, SOLUTION INTRAVENOUS at 05:03

## 2017-03-04 RX ADMIN — PROMETHAZINE HYDROCHLORIDE 6.25 MG: 25 INJECTION, SOLUTION INTRAMUSCULAR; INTRAVENOUS at 04:03

## 2017-03-04 RX ADMIN — ONDANSETRON 4 MG: 2 INJECTION INTRAMUSCULAR; INTRAVENOUS at 08:03

## 2017-03-04 RX ADMIN — ONDANSETRON 4 MG: 2 INJECTION INTRAMUSCULAR; INTRAVENOUS at 01:03

## 2017-03-04 RX ADMIN — SODIUM CHLORIDE: 0.9 INJECTION, SOLUTION INTRAVENOUS at 04:03

## 2017-03-04 RX ADMIN — PROMETHAZINE HYDROCHLORIDE 6.25 MG: 25 INJECTION, SOLUTION INTRAMUSCULAR; INTRAVENOUS at 06:03

## 2017-03-04 RX ADMIN — ONDANSETRON 4 MG: 2 INJECTION INTRAMUSCULAR; INTRAVENOUS at 11:03

## 2017-03-04 NOTE — PLAN OF CARE
Problem: Patient Care Overview  Goal: Plan of Care Review  Outcome: Ongoing (interventions implemented as appropriate)  Plan of care reviewed with patient with no questions or concerns at this time. Pain was assessed and managed with PCA pump during shift. Vital signs assessed every 4 hours during shift. IV intact with continuous fluids infusing. SCD's in place. Patient was bladder scanned and with straight cath performed during shift.Surgical incisions clean, dry, and intact. Bed alarm set with call light within reach. Will continue to monitor.

## 2017-03-04 NOTE — CONSULTS
"RD received consult for "post nissen diet."    Handout and verbal education provided to pt and family member.  Pt currently tolerating sips of CL with some nausea.     No further questions at end of education. Pt to consume full liquids until f/u with MD outpatient.    Thanks,  HATTIE Leger, LDN  [id number]      "

## 2017-03-04 NOTE — PROGRESS NOTES
SUBJECTIVE:  Pt seen and examined. Required in/out catheter for urinary retention overnight. Denied any nausea until this AM; states 10 minutes after waking she developed severe nausea with retching.       OBJECTIVE:  Temp:  [96.8 °F (36 °C)-99.1 °F (37.3 °C)]   Pulse:  [78-99]   Resp:  [11-21]   BP: (102-144)/(54-78)   SpO2:  [94 %-100 %]    Distress 2/2 nausea currently  cta bilaterally  rrr  abd- soft, non-distended, BS+. Incisions c/d/i. Appropriate tenderness.     I & O (Last 24H):  Intake/Output Summary (Last 24 hours) at 03/04/17 0720  Last data filed at 03/04/17 0316   Gross per 24 hour   Intake             3002 ml   Output              700 ml   Net             2302 ml         ASSESSMENT/PLAN:   Sarah Ulloa is a 36 y.o. female POD 1 from lap hiatal hernia repair with toupet fundoplication.     Nausea control  D/c pca, start hycet once nausea resolves  Home meds  Ambulate  dvt / gi prophy  Morning labs pending; will f/u  Possibly home today, but plan to keep overnight    Randy Ku PGY5

## 2017-03-05 VITALS
SYSTOLIC BLOOD PRESSURE: 136 MMHG | OXYGEN SATURATION: 97 % | HEART RATE: 97 BPM | DIASTOLIC BLOOD PRESSURE: 76 MMHG | WEIGHT: 230 LBS | HEIGHT: 60 IN | RESPIRATION RATE: 16 BRPM | BODY MASS INDEX: 45.16 KG/M2 | TEMPERATURE: 98 F

## 2017-03-05 LAB
ANION GAP SERPL CALC-SCNC: 8 MMOL/L
BASOPHILS # BLD AUTO: 0.01 K/UL
BASOPHILS NFR BLD: 0.1 %
BUN SERPL-MCNC: 5 MG/DL
CALCIUM SERPL-MCNC: 8 MG/DL
CHLORIDE SERPL-SCNC: 110 MMOL/L
CO2 SERPL-SCNC: 21 MMOL/L
CREAT SERPL-MCNC: 0.8 MG/DL
DIFFERENTIAL METHOD: ABNORMAL
EOSINOPHIL # BLD AUTO: 0.2 K/UL
EOSINOPHIL NFR BLD: 1.9 %
ERYTHROCYTE [DISTWIDTH] IN BLOOD BY AUTOMATED COUNT: 15.6 %
EST. GFR  (AFRICAN AMERICAN): >60 ML/MIN/1.73 M^2
EST. GFR  (NON AFRICAN AMERICAN): >60 ML/MIN/1.73 M^2
GLUCOSE SERPL-MCNC: 84 MG/DL
HCT VFR BLD AUTO: 34.2 %
HGB BLD-MCNC: 10.8 G/DL
LYMPHOCYTES # BLD AUTO: 2.4 K/UL
LYMPHOCYTES NFR BLD: 24.6 %
MAGNESIUM SERPL-MCNC: 1.8 MG/DL
MCH RBC QN AUTO: 26.7 PG
MCHC RBC AUTO-ENTMCNC: 31.6 %
MCV RBC AUTO: 85 FL
MONOCYTES # BLD AUTO: 0.6 K/UL
MONOCYTES NFR BLD: 6.7 %
NEUTROPHILS # BLD AUTO: 6.3 K/UL
NEUTROPHILS NFR BLD: 66.1 %
PHOSPHATE SERPL-MCNC: 2.2 MG/DL
PLATELET # BLD AUTO: 218 K/UL
PMV BLD AUTO: 9.2 FL
POTASSIUM SERPL-SCNC: 3.6 MMOL/L
RBC # BLD AUTO: 4.04 M/UL
SODIUM SERPL-SCNC: 139 MMOL/L
WBC # BLD AUTO: 9.56 K/UL

## 2017-03-05 PROCEDURE — 63600175 PHARM REV CODE 636 W HCPCS: Performed by: SURGERY

## 2017-03-05 PROCEDURE — 85025 COMPLETE CBC W/AUTO DIFF WBC: CPT

## 2017-03-05 PROCEDURE — 36415 COLL VENOUS BLD VENIPUNCTURE: CPT

## 2017-03-05 PROCEDURE — 84100 ASSAY OF PHOSPHORUS: CPT

## 2017-03-05 PROCEDURE — 80048 BASIC METABOLIC PNL TOTAL CA: CPT

## 2017-03-05 PROCEDURE — 25000003 PHARM REV CODE 250: Performed by: SURGERY

## 2017-03-05 PROCEDURE — C9113 INJ PANTOPRAZOLE SODIUM, VIA: HCPCS | Performed by: SURGERY

## 2017-03-05 PROCEDURE — 83735 ASSAY OF MAGNESIUM: CPT

## 2017-03-05 RX ADMIN — ENOXAPARIN SODIUM 40 MG: 100 INJECTION SUBCUTANEOUS at 11:03

## 2017-03-05 RX ADMIN — ONDANSETRON 4 MG: 2 INJECTION INTRAMUSCULAR; INTRAVENOUS at 05:03

## 2017-03-05 RX ADMIN — ONDANSETRON 4 MG: 2 INJECTION INTRAMUSCULAR; INTRAVENOUS at 11:03

## 2017-03-05 RX ADMIN — POTASSIUM PHOSPHATE, MONOBASIC AND POTASSIUM PHOSPHATE, DIBASIC 30 MMOL: 224; 236 INJECTION, SOLUTION, CONCENTRATE INTRAVENOUS at 09:03

## 2017-03-05 RX ADMIN — PANTOPRAZOLE SODIUM 40 MG: 40 INJECTION, POWDER, FOR SOLUTION INTRAVENOUS at 09:03

## 2017-03-05 NOTE — PLAN OF CARE
Problem: Patient Care Overview  Goal: Plan of Care Review  Outcome: Ongoing (interventions implemented as appropriate)  Plan of care reviewed with patient with no questions or concerns at this time. Pain was assessed and managed with PCA pump during shift. Vital signs assessed every 4 hours during shift. IV intact with continuous fluids infusing. Antiemetics administered as ordered. SCD's in place. Patient up ad shelli and voids.Surgical incisions clean, dry, and intact. Bed alarm set with call light within reach. Will continue to monitor.

## 2017-03-05 NOTE — NURSING
Pt discharged, pt AAOx3, VSS. Instructions and prescriptions given and explained. Pt verbalized understanding. Transport called.

## 2017-03-05 NOTE — PROGRESS NOTES
SUBJECTIVE:  Pt seen and examined. Feeling much better this AM. No more retching since yesterday morning. Tolerating some clear liquids. Able to urinate well following in/out cath on the night of POD 0.       OBJECTIVE:  Temp:  [96.7 °F (35.9 °C)-99.2 °F (37.3 °C)]   Pulse:  [85-93]   Resp:  [16-18]   BP: (108-134)/(71-78)   SpO2:  [93 %-95 %]    Distress 2/2 nausea currently  cta bilaterally  rrr  abd- soft, non-distended, BS+. Incisions c/d/i. Appropriate tenderness.   Moving all extremities  ambulating    I & O (Last 24H):    Intake/Output Summary (Last 24 hours) at 03/05/17 0727  Last data filed at 03/05/17 0338   Gross per 24 hour   Intake             1620 ml   Output                0 ml   Net             1620 ml       Recent Labs  Lab 03/05/17  0525   WBC 9.56   RBC 4.04   HGB 10.8*   HCT 34.2*      MCV 85   MCH 26.7*   MCHC 31.6*       Recent Labs  Lab 03/05/17  0525      K 3.6      CO2 21*   BUN 5*   CREATININE 0.8   MG 1.8         ASSESSMENT/PLAN:   Sarah Ulloa is a 36 y.o. female POD 2 from lap hiatal hernia repair with toupet fundoplication.     Nausea control  D/c pca, PO pain meds  Home meds  Ambulate  dvt / gi prophy  Replace lytes   Plan for home today  Full liquids x1 week, post-nissen 1 week; if tolerating fulls well within a few days, okay to start post-nissen early, but advised to go back to fulls if any difficulty with soft.   Replace lytes- hypokalemia, hyophosphatemia    Randy Ku PGY5

## 2017-03-07 ENCOUNTER — PATIENT OUTREACH (OUTPATIENT)
Dept: ADMINISTRATIVE | Facility: CLINIC | Age: 37
End: 2017-03-07
Payer: COMMERCIAL

## 2017-03-07 NOTE — PATIENT INSTRUCTIONS
After Laparoscopic Hernia Repair  You had a procedure called laparoscopic hernia repair. A hernia is a defect in the tough tissue covering the musculature of the abdominal wall (fascia). During laparoscopic hernia surgery, a surgeon inserts a telescope attached to a camera as well as surgical instruments through tiny incisions in your abdomen. The surgeon repairs the hernia with a mesh, which patches the tear or weakness in the fascia.  Home care  · Note that your shoulder may feel tight or your neck may be stiff for 24 to 48 hours after your surgery. This is common and usually lasts a short time. You may also have numbness around the incision area.  · Keep doing the coughing and deep breathing exercises that you learned in the hospital. These will help to prevent lung infection.  · Prevent constipation so you dont strain when going to the bathroom. Eat fruits, vegetables, and whole grains. Drink 6 to 8 glasses of water a day, unless otherwise directed. Use a laxative or a mild stool softener if your healthcare provider says its OK.  · Wash your incision with mild soap and water. Pat it dry. Dont use oil, powder, or lotion on your incision.  · Shower or take baths as instructed by your healthcare provider. Instructions will vary based on how your incision was closed and how its healing. It may be closed with glue, sutures, or staples. Your healthcare provider may have different advice for each kind.  Activity  · Ask others to help with chores and errands while you recover.  · Dont lift anything heavier than 10 pounds until your healthcare provider says its OK.  · Dont mow the lawn, use a vacuum , or do other strenuous activities until your healthcare provider says it's OK.  · Climb stairs slowly and pause after every few steps.  · Walk as often as you feel able.  · Ask your healthcare provider when you can drive again. This may be when you stop taking pain medicine and can move comfortably from side  to side. Dont drive if you are still taking opioid pain medicine.  When to call your healthcare provider   Call your healthcare provider right away if you have any of the following:  · Pain, bleeding, redness, or fluid at the incision site that gets worse  · Fever of 100.4°F (38°C) or higher, or as directed by your healthcare provider  · Vomiting or nausea that doesnt go away  · Inability to urinate  · No bowel movement after 3 days  · Swelling in abdomen or groin that gets worse  · Pain thats not relieved by medicine   Date Last Reviewed: 10/1/2016  © 8714-8625 [employer]. [address] [place] [address], [place] [id number]. All rights reserved. This information is not intended as a substitute for professional medical care. Always follow your healthcare professional's instructions.        After Laparoscopic Hernia Repair  You had a procedure called laparoscopic hernia repair. A hernia is a defect in the tough tissue covering the musculature of the abdominal wall (fascia). During laparoscopic hernia surgery, a surgeon inserts a telescope attached to a camera as well as surgical instruments through tiny incisions in your abdomen. The surgeon repairs the hernia with a mesh, which patches the tear or weakness in the fascia.  Home care  · Note that your shoulder may feel tight or your neck may be stiff for 24 to 48 hours after your surgery. This is common and usually lasts a short time. You may also have numbness around the incision area.  · Keep doing the coughing and deep breathing exercises that you learned in the hospital. These will help to prevent lung infection.  · Prevent constipation so you dont strain when going to the bathroom. Eat fruits, vegetables, and whole grains. Drink 6 to 8 glasses of water a day, unless otherwise directed. Use a laxative or a mild stool softener if your healthcare provider says its OK.  · Wash your incision with mild soap and water. Pat it dry. Dont use oil, powder, or  lotion on your incision.  · Shower or take baths as instructed by your healthcare provider. Instructions will vary based on how your incision was closed and how its healing. It may be closed with glue, sutures, or staples. Your healthcare provider may have different advice for each kind.  Activity  · Ask others to help with chores and errands while you recover.  · Dont lift anything heavier than 10 pounds until your healthcare provider says its OK.  · Dont mow the lawn, use a vacuum , or do other strenuous activities until your healthcare provider says it's OK.  · Climb stairs slowly and pause after every few steps.  · Walk as often as you feel able.  · Ask your healthcare provider when you can drive again. This may be when you stop taking pain medicine and can move comfortably from side to side. Dont drive if you are still taking opioid pain medicine.  When to call your healthcare provider   Call your healthcare provider right away if you have any of the following:  · Pain, bleeding, redness, or fluid at the incision site that gets worse  · Fever of 100.4°F (38°C) or higher, or as directed by your healthcare provider  · Vomiting or nausea that doesnt go away  · Inability to urinate  · No bowel movement after 3 days  · Swelling in abdomen or groin that gets worse  · Pain thats not relieved by medicine   Date Last Reviewed: 10/1/2016  © 4490-8487 [employer]. [address] [place] [address], [place] [address]. All rights reserved. This information is not intended as a substitute for professional medical care. Always follow your healthcare professional's instructions.

## 2017-03-10 ENCOUNTER — PATIENT MESSAGE (OUTPATIENT)
Dept: SURGERY | Facility: CLINIC | Age: 37
End: 2017-03-10

## 2017-03-16 ENCOUNTER — OFFICE VISIT (OUTPATIENT)
Dept: SURGERY | Facility: CLINIC | Age: 37
End: 2017-03-16
Payer: COMMERCIAL

## 2017-03-16 VITALS
HEART RATE: 91 BPM | SYSTOLIC BLOOD PRESSURE: 121 MMHG | BODY MASS INDEX: 43.39 KG/M2 | WEIGHT: 221 LBS | DIASTOLIC BLOOD PRESSURE: 86 MMHG | HEIGHT: 60 IN | TEMPERATURE: 99 F

## 2017-03-16 DIAGNOSIS — Z09 POSTOP CHECK: Primary | ICD-10-CM

## 2017-03-16 PROBLEM — K21.9 GERD (GASTROESOPHAGEAL REFLUX DISEASE): Status: RESOLVED | Noted: 2017-02-13 | Resolved: 2017-03-16

## 2017-03-16 PROBLEM — K44.9 HIATAL HERNIA: Status: RESOLVED | Noted: 2017-03-03 | Resolved: 2017-03-16

## 2017-03-16 PROCEDURE — 99024 POSTOP FOLLOW-UP VISIT: CPT | Mod: S$GLB,,, | Performed by: SURGERY

## 2017-03-16 PROCEDURE — 99999 PR PBB SHADOW E&M-EST. PATIENT-LVL III: CPT | Mod: PBBFAC,,, | Performed by: SURGERY

## 2017-03-16 NOTE — PROGRESS NOTES
Sarah Ulloa is a 36 y.o. female patient.   1. Postop check      Past Medical History:   Diagnosis Date    Apnea, sleep     Arthritis     Bipolar 1 disorder     Clotting disorder 2006    Factor 5    GERD (gastroesophageal reflux disease)     Low back pain due to displacement of intervertebral disc     Morbid obesity with BMI of 45.0-49.9, adult      No past surgical history pertinent negatives on file.  Scheduled Meds:  Continuous Infusions:  PRN Meds:    Review of patient's allergies indicates:   Allergen Reactions    Percocet [oxycodone-acetaminophen] Nausea And Vomiting    Sulfa (sulfonamide antibiotics) Rash     There are no hospital problems to display for this patient.    Blood pressure 121/86, pulse 91, temperature 98.5 °F (36.9 °C), height 5' (1.524 m), weight 100.2 kg (221 lb).    Subjective S/p lap hh with Toupet fundoplication 3/3/17.  She has no heartburn, regurgitation, hoarseness or cough (resolution of all preop symptoms).  She has moderate dysphagia on a soft diet.  She is taking her ppi.  She has no pain unless she takes a deep breath.  Objective Wounds clear, abdomen benign.   Assessment & Plan She is happy with her procedure.  Slowly get off ppi, light duty one more month and advance diet as tolerated.  Rtc one month (4/14/17).       Jonathon Peck MD  3/16/2017

## 2017-03-16 NOTE — LETTER
[facility] - General Surgery  [address]  [place] [address]  Phone: [phone] March 16, 2017      Oanh Germain MD  [address]   [id number]  [place] [address]    Patient: Sarah Ulloa   MR Number: [id number]   YOB: 1980   Date of Visit: 3/16/2017     Dear Dr. Germain:    Thank you for referring Sarah Ulloa to me for evaluation. Below are the relevant portions of my assessment and plan of care.    Sarah Ulloa is a 36-year-old female patient lap HH with Toupet fundoplication 3/3/17. She has no heartburn, regurgitation, hoarseness or cough (resolution of all preop symptoms). She has moderate dysphagia on a soft diet. She is taking her PPI. She has no pain unless she takes a deep breath  1. Postop check      PLAN:Patient is happy with her procedure. Slowly get off ppi, light duty one more month and advance diet as tolerated. Rtc one month (4/14/17).     If you have questions, please do not hesitate to call me. I look forward to following Sarah along with you.    Sincerely,      Jonathon Peck MD   [facility] - General, Laparoscopic, Bariatric  Acute Care and Oncologic Surgery   - Surgical Weight Loss Program  [facility]    WSR/evelia    CC  Uzma Sanchez MD

## 2017-03-27 ENCOUNTER — PATIENT MESSAGE (OUTPATIENT)
Dept: SURGERY | Facility: CLINIC | Age: 37
End: 2017-03-27

## 2017-04-27 ENCOUNTER — OFFICE VISIT (OUTPATIENT)
Dept: BARIATRICS | Facility: CLINIC | Age: 37
End: 2017-04-27
Payer: COMMERCIAL

## 2017-04-27 VITALS
SYSTOLIC BLOOD PRESSURE: 126 MMHG | HEIGHT: 60 IN | DIASTOLIC BLOOD PRESSURE: 70 MMHG | HEART RATE: 76 BPM | BODY MASS INDEX: 43.59 KG/M2 | WEIGHT: 222 LBS

## 2017-04-27 DIAGNOSIS — E66.01 MORBID OBESITY WITH BMI OF 40.0-44.9, ADULT: Primary | ICD-10-CM

## 2017-04-27 PROCEDURE — 1160F RVW MEDS BY RX/DR IN RCRD: CPT | Mod: S$GLB,,, | Performed by: INTERNAL MEDICINE

## 2017-04-27 PROCEDURE — 99999 PR PBB SHADOW E&M-EST. PATIENT-LVL IV: CPT | Mod: PBBFAC,,, | Performed by: INTERNAL MEDICINE

## 2017-04-27 PROCEDURE — 99213 OFFICE O/P EST LOW 20 MIN: CPT | Mod: S$GLB,,, | Performed by: INTERNAL MEDICINE

## 2017-04-27 RX ORDER — DIETHYLPROPION HYDROCHLORIDE 75 MG/1
75 TABLET, EXTENDED RELEASE ORAL DAILY
Qty: 30 TABLET | Refills: 0 | Status: SHIPPED | OUTPATIENT
Start: 2017-04-27 | End: 2017-05-29

## 2017-04-27 RX ORDER — TIZANIDINE 2 MG/1
TABLET ORAL 2 TIMES DAILY PRN
COMMUNITY
Start: 2017-04-26 | End: 2018-10-12

## 2017-04-27 NOTE — PATIENT INSTRUCTIONS
Exercise 30 min 3 days a week this month.     Wean artificial sweeteners. Replace with water.     3 meals a day made up of the following:  Unlimited green vegetables, tomatoes, mushrooms, spaghetti squash, cauliflower, meat, poultry, seafood, eggs and hard cheeses.   Milk and plain yogurt  Dressings, seasonings, condiments, etc should have less than 2 g sugars.   beans or nuts can have 1 x a day.   1-2 servings of citrus fruits, berries, pineapple or melon a day (1/2 cup)  Avoid fried foods    No grains, rice, pasta, potatoes or bread    No soda, sweet tea, juices or lemonade    [email or web address] for recipes.    Patient warned of common side effects of diethylpropion including anxiety, insomnia, palpitations and increased blood pressure. It was also explained that it is for short-term usage along with diet and exercise, and that stopping the medication without making lifestyle changes will result in regain of weight. Patient states understanding.    Return in about 4 weeks (around 5/25/2017).

## 2017-04-27 NOTE — MR AVS SNAPSHOT
[facility] - Bariatric Surgery  [address]  [facility] [address]  Phone: [phone]  Fax: [phone]                  Sarah Ulloa   2017 1:15 PM   Office Visit    Description:  Female : 1980   Provider:  Uzma Sanchez MD   Department:  [facility] - Bariatric Surgery           Reason for Visit     Follow-up           Diagnoses this Visit        Comments    Morbid obesity with BMI of 40.0-44.9, adult    -  Primary            To Do List           Future Appointments        Provider Department Dept Phone    2017 11:45 AM Jonathon Peck MD [facility] - General Surgery [phone]      Goals (5 Years of Data)     None      Follow-Up and Disposition     Return in about 4 weeks (around 2017).       These Medications        Disp Refills Start End    diethylpropion 75 mg TbSR 30 tablet 0 2017     Take 75 mg by mouth once daily. - Oral    Pharmacy: [id number] - [place] - [address] [facility] #: [phone]         [facility]     [facility] -  Assistance  Unless otherwise directed by your provider, please contact [facility], our nurse care line that is available for  assistance.     Registered nurses in the [facility] provide: appointment scheduling, clinical advisement, health education, and other advisory services.  Call: [phone] (toll free)               Medications           Message regarding Medications     Verify the changes and/or additions to your medication regime listed below are the same as discussed with your clinician today.  If any of these changes or additions are incorrect, please notify your healthcare provider.        START taking these NEW medications        Refills    diethylpropion 75 mg TbSR 0    Sig: Take 75 mg by mouth once daily.    Class: Print    Route: Oral      STOP taking these medications     pantoprazole (PROTONIX) 40 MG tablet Take 40 mg by mouth once  daily.    omeprazole (PRILOSEC) 20 MG capsule Take 1 capsule (20 mg total) by mouth once daily.           Verify that the below list of medications is an accurate representation of the medications you are currently taking.  If none reported, the list may be blank. If incorrect, please contact your healthcare provider. Carry this list with you in case of emergency.           Current Medications     buPROPion (WELLBUTRIN SR) 200 MG TbSR 200 mg 2 (two) times daily.    diethylpropion 75 mg TbSR Take 75 mg by mouth once daily.    hydrocodone-acetaminophen (HYCET) solution 7.5-325 mg/15mL Take 15 mLs by mouth 4 (four) times daily as needed for Pain.    lamotrigine (LAMICTAL) 150 MG Tab 150 mg 2 (two) times daily.    ondansetron (ZOFRAN-ODT) 4 MG TbDL Take 1 tablet (4 mg total) by mouth every 8 (eight) hours as needed (nausea).    polyethylene glycol (GLYCOLAX) 17 gram/dose powder Take 17 g by mouth daily as needed (constipation).    promethazine (PHENERGAN) 25 MG suppository Place 1 suppository (25 mg total) rectally every 6 (six) hours as needed for Nausea.    SAPHRIS, BLACK CHERRY, 5 mg Subl 5 mg once daily.    tizanidine (ZANAFLEX) 2 MG tablet     tramadol (ULTRAM) 50 mg tablet 50 mg daily as needed. One or two tablets PRN    triamcinolone acetonide 0.1% (KENALOG) 0.1 % cream     TRINTELLIX 10 mg Tab 10 mg once daily.           Clinical Reference Information           Your Vitals Were     BP Pulse Height Weight BMI    126/70 76 5' (1.524 m) 100.7 kg (222 lb 0.1 oz) 43.36 kg/m2      Blood Pressure          Most Recent Value    BP  126/70      Allergies as of 4/27/2017     Percocet [Oxycodone-acetaminophen]    Sulfa (Sulfonamide Antibiotics)    Phentermine      Immunizations Administered on Date of Encounter - 4/27/2017     None      Instructions    Exercise 30 min 3 days a week this month.     Wean artificial sweeteners. Replace with water.     3 meals a day made up of the following:  Unlimited green vegetables,  tomatoes, mushrooms, spaghetti squash, cauliflower, meat, poultry, seafood, eggs and hard cheeses.   Milk and plain yogurt  Dressings, seasonings, condiments, etc should have less than 2 g sugars.   beans or nuts can have 1 x a day.   1-2 servings of citrus fruits, berries, pineapple or melon a day (1/2 cup)  Avoid fried foods    No grains, rice, pasta, potatoes or bread    No soda, sweet tea, juices or lemonade    [email or web address] for recipes.    Patient warned of common side effects of diethylpropion including anxiety, insomnia, palpitations and increased blood pressure. It was also explained that it is for short-term usage along with diet and exercise, and that stopping the medication without making lifestyle changes will result in regain of weight. Patient states understanding.    Return in about 4 weeks (around 5/25/2017).         Language Assistance Services     ATTENTION: Language assistance services are available, free of charge. Please call [phone].      ATENCIÓN: Si habla español, tiene a pereira disposición servicios gratuitos de asistencia lingüística. Llame al [phone].     KELSEY Ý: N?u b?n nói Ti?ng Vi?t, có các d?ch v? h? tr? ngôn ng? mi?n phí dành cho b?n. G?i s? [phone].         [facility] complies with applicable Federal civil rights laws and does not discriminate on the basis of race, color, national origin, age, disability, or sex.

## 2017-04-27 NOTE — PROGRESS NOTES
Subjective:       Patient ID: Sarah Ulloa is a 36 y.o. female.    Chief Complaint: Follow-up    HPI Comments: Pt here today for follow up. She has lost 11 lbs since January,. She did have Toupet fundoplication since last OV. She is cleared for regular diet and activity.     Review of Systems   Constitutional: Negative for chills and fever.   Respiratory: Negative for apnea and shortness of breath.         + snores   Cardiovascular: Negative for chest pain and leg swelling.   Gastrointestinal: Negative for constipation and diarrhea.        + GERD   Genitourinary: Negative for difficulty urinating and menstrual problem.   Musculoskeletal: Positive for arthralgias and back pain.        Hips   Neurological: Negative for dizziness and light-headedness.   Psychiatric/Behavioral: Positive for dysphoric mood. The patient is nervous/anxious.         Under control. Sees Psych       Objective:       /70  Pulse 76  Ht 5' (1.524 m)  Wt 100.7 kg (222 lb 0.1 oz)  BMI 43.36 kg/m2    Physical Exam   Constitutional: She is oriented to person, place, and time. She appears well-developed. No distress.   Morbidly obese     HENT:   Head: Normocephalic and atraumatic.   Eyes: EOM are normal. Pupils are equal, round, and reactive to light.   Neck: Normal range of motion. Neck supple. No thyromegaly present.   Cardiovascular: Normal rate and normal heart sounds.  Exam reveals no gallop and no friction rub.    No murmur heard.  Pulmonary/Chest: Effort normal and breath sounds normal. No respiratory distress. She has no wheezes.   Abdominal: Soft. Bowel sounds are normal. She exhibits no distension. There is no tenderness.   Musculoskeletal: Normal range of motion. She exhibits no edema.   Neurological: She is alert and oriented to person, place, and time. No cranial nerve deficit.   Skin: Skin is warm and dry. No erythema.   Psychiatric: She has a normal mood and affect. Her behavior is normal. Judgment normal.   Vitals  reviewed.      Assessment:       1. Morbid obesity with BMI of 40.0-44.9, adult        Plan:             1.Morbid obesity with BMI of 45.0-49.9, adult    - phentermine (ADIPEX-P) 37.5 mg tablet; Take 1 tablet (37.5 mg total) by mouth before breakfast.  Dispense: 30 tablet; Refill: 0    Exercise 30 min 3 days a week this month.     Wean artificial sweeteners. Replace with water.     3 meals a day made up of the following:  Unlimited green vegetables, tomatoes, mushrooms, spaghetti squash, cauliflower, meat, poultry, seafood, eggs and hard cheeses.   Milk and plain yogurt  Dressings, seasonings, condiments, etc should have less than 2 g sugars.   beans or nuts can have 1 x a day.   1-2 servings of citrus fruits, berries, pineapple or melon a day (1/2 cup)  Avoid fried foods    No grains, rice, pasta, potatoes or bread    No soda, sweet tea, juices or lemonade    [email or web address] for recipes.    Patient warned of common side effects of diethylpropion including anxiety, insomnia, palpitations and increased blood pressure. It was also explained that it is for short-term usage along with diet and exercise, and that stopping the medication without making lifestyle changes will result in regain of weight. Patient states understanding.      .     Return in about 4 weeks

## 2017-05-05 ENCOUNTER — PATIENT MESSAGE (OUTPATIENT)
Dept: BARIATRICS | Facility: CLINIC | Age: 37
End: 2017-05-05

## 2017-05-29 ENCOUNTER — OFFICE VISIT (OUTPATIENT)
Dept: INTERNAL MEDICINE | Facility: CLINIC | Age: 37
End: 2017-05-29
Payer: COMMERCIAL

## 2017-05-29 VITALS
BODY MASS INDEX: 43.81 KG/M2 | HEART RATE: 75 BPM | WEIGHT: 223.13 LBS | SYSTOLIC BLOOD PRESSURE: 99 MMHG | DIASTOLIC BLOOD PRESSURE: 62 MMHG | HEIGHT: 60 IN

## 2017-05-29 DIAGNOSIS — D68.51 FACTOR 5 LEIDEN MUTATION, HETEROZYGOUS: ICD-10-CM

## 2017-05-29 DIAGNOSIS — E04.1 THYROID NODULE: ICD-10-CM

## 2017-05-29 DIAGNOSIS — F51.01 PRIMARY INSOMNIA: ICD-10-CM

## 2017-05-29 DIAGNOSIS — R79.0 LOW SERUM PHOSPHORUS FOR AGE: ICD-10-CM

## 2017-05-29 DIAGNOSIS — Z98.890 S/P NISSEN FUNDOPLICATION (WITHOUT GASTROSTOMY TUBE) PROCEDURE: ICD-10-CM

## 2017-05-29 DIAGNOSIS — Z13.220 SCREENING, LIPID: ICD-10-CM

## 2017-05-29 DIAGNOSIS — M54.50 CHRONIC LEFT-SIDED LOW BACK PAIN WITHOUT SCIATICA: ICD-10-CM

## 2017-05-29 DIAGNOSIS — G43.909 MIGRAINE WITHOUT STATUS MIGRAINOSUS, NOT INTRACTABLE, UNSPECIFIED MIGRAINE TYPE: ICD-10-CM

## 2017-05-29 DIAGNOSIS — F31.9 BIPOLAR 1 DISORDER: ICD-10-CM

## 2017-05-29 DIAGNOSIS — Z13.21 ENCOUNTER FOR VITAMIN DEFICIENCY SCREENING: ICD-10-CM

## 2017-05-29 DIAGNOSIS — Z11.3 SCREENING EXAMINATION FOR STD (SEXUALLY TRANSMITTED DISEASE): ICD-10-CM

## 2017-05-29 DIAGNOSIS — Z00.00 ANNUAL PHYSICAL EXAM: Primary | ICD-10-CM

## 2017-05-29 DIAGNOSIS — D64.9 ANEMIA, UNSPECIFIED TYPE: ICD-10-CM

## 2017-05-29 DIAGNOSIS — D68.9 CLOTTING DISORDER: ICD-10-CM

## 2017-05-29 DIAGNOSIS — G89.29 CHRONIC LEFT-SIDED LOW BACK PAIN WITHOUT SCIATICA: ICD-10-CM

## 2017-05-29 PROCEDURE — 87591 N.GONORRHOEAE DNA AMP PROB: CPT

## 2017-05-29 PROCEDURE — 99385 PREV VISIT NEW AGE 18-39: CPT | Mod: S$GLB,,, | Performed by: FAMILY MEDICINE

## 2017-05-29 PROCEDURE — 99999 PR PBB SHADOW E&M-EST. PATIENT-LVL IV: CPT | Mod: PBBFAC,,, | Performed by: FAMILY MEDICINE

## 2017-05-29 RX ORDER — PROPRANOLOL HYDROCHLORIDE 20 MG/1
20 TABLET ORAL 2 TIMES DAILY
Qty: 180 TABLET | Refills: 1 | Status: SHIPPED | OUTPATIENT
Start: 2017-05-29 | End: 2018-02-21

## 2017-05-29 RX ORDER — BUTALBITAL, ACETAMINOPHEN AND CAFFEINE 50; 325; 40 MG/1; MG/1; MG/1
1 TABLET ORAL 2 TIMES DAILY PRN
Qty: 30 TABLET | Refills: 0 | Status: SHIPPED | OUTPATIENT
Start: 2017-05-29 | End: 2017-06-28

## 2017-05-29 NOTE — PROGRESS NOTES
Subjective:       Patient ID: Sarah Ulloa is a 36 y.o. female.    Chief Complaint: Migraine and Establish Care    HPI   37 y/o female with migraines, hx of GERD s/p Nissen in 3/2017, no longer having sx, hx of thyroid nodules, Factor V Leiden def, CLBP, Bipolar 1 is here to establish care.  She has had headaches since childhood, she has been on propanolol and fioricet in the past, she had neck surgery in 7/2016 and this significantly improved her headaches, now she is having headaches about 1 time a week, occur on top of her head, throbbing in nature, can last all day, sometimes the meds help or a hot shower, she gets associated nausea at times, no vomiting, she get light, noise and smell sensitivity.  She was seen by Neurology in the past, never had CT head, had botox which did not help, trial of keppra did not help. Denies f/d/constipation, endorses 3 bm daily since nissen, denies cp/sob/urinary sx.  Sleeping well.  Appetite good. Not doing any dedicated exercise.  Hx of thyroid nodules, due for ultrasound, never had biopsy  Bipolar 1: followed by Dr. Armstrong, doing well on lamotrigine, wellbutrin  CLBP: she is on tramadol prn, about 4 times a week, zanaflex prn, about 1 time a week, followed by tdo Malik having ERLIN  GYN: followed by Dr. Pedersen at [facility], pap utd  Eye exam utd  Dental utd  Vaccines: tdap due  Derm: was seen by doc on [facility], topical steroids resolved    Review of Systems   Constitutional: Negative for activity change and unexpected weight change.   HENT: Negative for hearing loss, rhinorrhea and trouble swallowing.    Eyes: Negative for discharge and visual disturbance.   Respiratory: Negative for chest tightness and wheezing.    Cardiovascular: Negative for chest pain and palpitations.   Gastrointestinal: Negative for blood in stool, constipation, diarrhea and vomiting.   Endocrine: Negative for polydipsia and polyuria.   Genitourinary: Negative for difficulty  urinating, dysuria, hematuria and menstrual problem.   Musculoskeletal: Positive for neck pain. Negative for arthralgias and joint swelling.   Neurological: Positive for headaches. Negative for weakness.   Psychiatric/Behavioral: Negative for confusion, dysphoric mood and sleep disturbance.       Objective:      BP 99/62   Pulse 75   Ht 5' (1.524 m)   Wt 101.2 kg (223 lb 1.7 oz)   BMI 43.57 kg/m²   Physical Exam   Constitutional: She appears well-developed and well-nourished.   HENT:   Head: Normocephalic and atraumatic.   Mouth/Throat: No oropharyngeal exudate.   Neck: Normal range of motion. Neck supple. No thyromegaly present.   Cardiovascular: Normal rate, regular rhythm and normal heart sounds.    Pulmonary/Chest: Effort normal and breath sounds normal. No respiratory distress.   Abdominal: Soft. Bowel sounds are normal. She exhibits no distension. There is no tenderness.   Musculoskeletal: She exhibits no edema.   Lymphadenopathy:     She has no cervical adenopathy.   Neurological: She is alert. No cranial nerve deficit.   Skin: Skin is warm and dry.   Psychiatric: She has a normal mood and affect.   Nursing note and vitals reviewed.      Assessment:       1. Annual physical exam    2. Clotting disorder    3. Thyroid nodule    4. Chronic left-sided low back pain without sciatica    5. Bipolar 1 disorder    6. Primary insomnia    7. BMI 40.0-44.9, adult    8. Low serum phosphorus for age    9. Screening examination for STD (sexually transmitted disease)    10. Screening, lipid    11. Encounter for vitamin deficiency screening    12. Anemia, unspecified type    13. S/P Nissen fundoplication (without gastrostomy tube) procedure    14. Migraine without status migrainosus, not intractable, unspecified migraine type    15. Factor 5 Leiden mutation, heterozygous        Plan:   Sarah was seen today for migraine and establish care.    Diagnoses and all orders for this visit:    Annual physical exam  -     CBC auto  differential; Future  -     Comprehensive metabolic panel; Future  -     TSH; Future    Clotting disorder    Thyroid nodule  -     US Soft Tissue Head Neck Thyroid; Future  -     TSH; Future    Chronic left-sided low back pain without sciatica    Bipolar 1 disorder    Primary insomnia    BMI 40.0-44.9, adult  -     Hemoglobin A1c; Future    Low serum phosphorus for age    Screening examination for STD (sexually transmitted disease)  -     C. trachomatis/N. gonorrhoeae by AMP DNA Urine  -     Hepatitis panel, acute; Future  -     HIV-1 and HIV-2 antibodies; Future  -     RPR; Future    Screening, lipid  -     Lipid panel; Future    Encounter for vitamin deficiency screening  -     Vitamin D; Future    Anemia, unspecified type  -     Reticulocytes; Future  -     Vitamin B12; Future  -     Ferritin; Future  -     Folate; Future  -     Iron and TIBC; Future    S/P Nissen fundoplication (without gastrostomy tube) procedure    Migraine without status migrainosus, not intractable, unspecified migraine type  -     propranolol (INDERAL) 20 MG tablet; Take 1 tablet (20 mg total) by mouth 2 (two) times daily.  -     butalbital-acetaminophen-caffeine -40 mg (FIORICET, ESGIC) -40 mg per tablet; Take 1 tablet by mouth 2 (two) times daily as needed for Pain.  -     Ambulatory Referral to Neurology    Factor 5 Leiden mutation, heterozygous

## 2017-05-30 LAB
C TRACH DNA SPEC QL NAA+PROBE: NOT DETECTED
N GONORRHOEA DNA SPEC QL NAA+PROBE: NOT DETECTED

## 2017-05-31 ENCOUNTER — HOSPITAL ENCOUNTER (OUTPATIENT)
Dept: RADIOLOGY | Facility: HOSPITAL | Age: 37
Discharge: HOME | End: 2017-05-31
Attending: FAMILY MEDICINE
Payer: COMMERCIAL

## 2017-05-31 DIAGNOSIS — E04.1 THYROID NODULE: ICD-10-CM

## 2017-05-31 PROCEDURE — 76536 US EXAM OF HEAD AND NECK: CPT | Mod: TC

## 2017-05-31 PROCEDURE — 76536 US EXAM OF HEAD AND NECK: CPT | Mod: 26,,, | Performed by: RADIOLOGY

## 2017-06-05 ENCOUNTER — OFFICE VISIT (OUTPATIENT)
Dept: NEUROLOGY | Facility: CLINIC | Age: 37
End: 2017-06-05
Payer: COMMERCIAL

## 2017-06-05 ENCOUNTER — TELEPHONE (OUTPATIENT)
Dept: INTERNAL MEDICINE | Facility: CLINIC | Age: 37
End: 2017-06-05

## 2017-06-05 VITALS
SYSTOLIC BLOOD PRESSURE: 113 MMHG | HEART RATE: 73 BPM | WEIGHT: 222 LBS | DIASTOLIC BLOOD PRESSURE: 75 MMHG | HEIGHT: 60 IN | BODY MASS INDEX: 43.59 KG/M2

## 2017-06-05 DIAGNOSIS — G43.009 MIGRAINE WITHOUT AURA AND WITHOUT STATUS MIGRAINOSUS, NOT INTRACTABLE: Primary | ICD-10-CM

## 2017-06-05 DIAGNOSIS — E55.9 VITAMIN D DEFICIENCY: ICD-10-CM

## 2017-06-05 DIAGNOSIS — R73.03 PREDIABETES: ICD-10-CM

## 2017-06-05 DIAGNOSIS — E61.1 IRON DEFICIENCY: ICD-10-CM

## 2017-06-05 PROCEDURE — 99204 OFFICE O/P NEW MOD 45 MIN: CPT | Mod: S$GLB,,, | Performed by: PSYCHIATRY & NEUROLOGY

## 2017-06-05 PROCEDURE — 99999 PR PBB SHADOW E&M-EST. PATIENT-LVL III: CPT | Mod: PBBFAC,,, | Performed by: PSYCHIATRY & NEUROLOGY

## 2017-06-05 RX ORDER — SUMATRIPTAN SUCCINATE 100 MG/1
TABLET ORAL
Qty: 10 TABLET | Refills: 6 | Status: SHIPPED | OUTPATIENT
Start: 2017-06-05 | End: 2017-08-01

## 2017-06-05 NOTE — ASSESSMENT & PLAN NOTE
Never had imaging studies. This is worthwhile to r/o Arnold-Chiari and pseudotumor cerebri.  Metabolic workup is warranted.  Imitrex for abortive therapy  Give propranolol a full trial to see if it works.
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
## 2017-06-05 NOTE — LETTER
June 5, 2017      Nini Pryor MD  [address]  [place] [address]           [facility]  [address]  [place] [address]  Phone: [phone]          Patient: Sarah Ulloa   MR Number: [id number]   YOB: 1980   Date of Visit: 6/5/2017       Dear Dr. Nini Pryor:    Thank you for referring Sarah Ulloa to me for evaluation. Attached you will find relevant portions of my assessment and plan of care.    If you have questions, please do not hesitate to call me. I look forward to following Sarah Ulloa along with you.    Sincerely,    Soledad Mendes MD    Enclosure  CC:  No Recipients    If you would like to receive this communication electronically, please contact [email or web address] or [phone] to request more information on [employer] Link access.    For providers and/or their staff who would like to refer a patient to [facility], please contact us through our one-stop-shop provider referral line, [facility] [place], at [phone].    If you feel you have received this communication in error or would no longer like to receive these types of communications, please e-mail [email or web address]

## 2017-06-06 ENCOUNTER — PATIENT MESSAGE (OUTPATIENT)
Dept: NEUROLOGY | Facility: CLINIC | Age: 37
End: 2017-06-06

## 2017-06-06 NOTE — TELEPHONE ENCOUNTER
----- Message from Roma Cuadra sent at 6/5/2017  3:10 PM CDT -----  Contact: self/[phone]  Patient called in regards needing to talk with Dr Pryor offices, patient need to go over her laboratorie results. Please call and advise.           Thank you!!!

## 2017-06-08 ENCOUNTER — PATIENT MESSAGE (OUTPATIENT)
Dept: NEUROLOGY | Facility: CLINIC | Age: 37
End: 2017-06-08

## 2017-06-23 ENCOUNTER — TELEPHONE (OUTPATIENT)
Dept: RADIOLOGY | Facility: HOSPITAL | Age: 37
End: 2017-06-23

## 2017-06-29 ENCOUNTER — PATIENT MESSAGE (OUTPATIENT)
Dept: NEUROLOGY | Facility: CLINIC | Age: 37
End: 2017-06-29

## 2017-06-30 DIAGNOSIS — G43.009 MIGRAINE WITHOUT AURA, WITHOUT MENTION OF INTRACTABLE MIGRAINE WITHOUT MENTION OF STATUS MIGRAINOSUS: Primary | ICD-10-CM

## 2017-07-07 ENCOUNTER — HOSPITAL ENCOUNTER (OUTPATIENT)
Dept: RADIOLOGY | Facility: HOSPITAL | Age: 37
Discharge: HOME | End: 2017-07-07
Attending: PSYCHIATRY & NEUROLOGY
Payer: COMMERCIAL

## 2017-07-07 PROCEDURE — 70498 CT ANGIOGRAPHY NECK: CPT | Mod: 26,,, | Performed by: RADIOLOGY

## 2017-07-07 PROCEDURE — 25500020 PHARM REV CODE 255: Performed by: PSYCHIATRY & NEUROLOGY

## 2017-07-07 PROCEDURE — 70496 CT ANGIOGRAPHY HEAD: CPT | Mod: TC

## 2017-07-07 PROCEDURE — 70496 CT ANGIOGRAPHY HEAD: CPT | Mod: 26,,, | Performed by: RADIOLOGY

## 2017-07-07 RX ADMIN — IOHEXOL 100 ML: 350 INJECTION, SOLUTION INTRAVENOUS at 08:07

## 2017-07-17 ENCOUNTER — PATIENT MESSAGE (OUTPATIENT)
Dept: NEUROLOGY | Facility: CLINIC | Age: 37
End: 2017-07-17

## 2017-07-21 ENCOUNTER — TELEPHONE (OUTPATIENT)
Dept: NEUROLOGY | Facility: CLINIC | Age: 37
End: 2017-07-21

## 2017-07-21 NOTE — TELEPHONE ENCOUNTER
----- Message from Lili Hays sent at 7/21/2017  8:57 AM CDT -----  Contact: self @ [phone]  Pt is calling to see if dr melgoza's office is going to schedule her lumbar puncture of if she needs to schedule it.  pls call.

## 2017-07-21 NOTE — TELEPHONE ENCOUNTER
Called patient and she would like to know when will you put in her order for her Lumbar Puncture so that she can get the procedure done.

## 2017-07-24 ENCOUNTER — TELEPHONE (OUTPATIENT)
Dept: INTERNAL MEDICINE | Facility: CLINIC | Age: 37
End: 2017-07-24

## 2017-07-24 ENCOUNTER — PATIENT MESSAGE (OUTPATIENT)
Dept: INTERNAL MEDICINE | Facility: CLINIC | Age: 37
End: 2017-07-24

## 2017-07-24 NOTE — TELEPHONE ENCOUNTER
Spoke with pt. Pt will call Vaughn with Neurology to set up an apt. Advised pt if she could get anything with them to give us a call and we would set up an U/C apt. Pt verbalized understanding.

## 2017-07-25 ENCOUNTER — TELEPHONE (OUTPATIENT)
Dept: NEUROLOGY | Facility: CLINIC | Age: 37
End: 2017-07-25

## 2017-07-25 DIAGNOSIS — G43.009 MIGRAINE WITHOUT AURA AND WITHOUT STATUS MIGRAINOSUS, NOT INTRACTABLE: Primary | ICD-10-CM

## 2017-07-26 ENCOUNTER — PATIENT MESSAGE (OUTPATIENT)
Dept: NEUROLOGY | Facility: CLINIC | Age: 37
End: 2017-07-26

## 2017-07-26 RX ORDER — BUTALBITAL, ACETAMINOPHEN AND CAFFEINE 50; 325; 40 MG/1; MG/1; MG/1
TABLET ORAL
Qty: 30 TABLET | Refills: 0 | Status: SHIPPED | OUTPATIENT
Start: 2017-07-26 | End: 2018-10-12

## 2017-07-27 ENCOUNTER — PATIENT MESSAGE (OUTPATIENT)
Dept: NEUROLOGY | Facility: CLINIC | Age: 37
End: 2017-07-27

## 2017-07-31 ENCOUNTER — HOSPITAL ENCOUNTER (OUTPATIENT)
Dept: RADIOLOGY | Facility: HOSPITAL | Age: 37
Discharge: HOME | End: 2017-07-31
Attending: PSYCHIATRY & NEUROLOGY
Payer: COMMERCIAL

## 2017-07-31 ENCOUNTER — TELEPHONE (OUTPATIENT)
Dept: NEUROLOGY | Facility: CLINIC | Age: 37
End: 2017-07-31

## 2017-07-31 DIAGNOSIS — G43.001: ICD-10-CM

## 2017-07-31 DIAGNOSIS — G43.001: Primary | ICD-10-CM

## 2017-07-31 DIAGNOSIS — G43.009 MIGRAINE WITHOUT AURA AND WITHOUT STATUS MIGRAINOSUS, NOT INTRACTABLE: ICD-10-CM

## 2017-07-31 LAB
CLARITY CSF: CLEAR
COLOR CSF: COLORLESS
GLUCOSE CSF-MCNC: 54 MG/DL
LYMPHOCYTES NFR CSF MANUAL: 100 %
PROT CSF-MCNC: 32 MG/DL
RBC # CSF: 6 /CU MM
SPECIMEN VOL CSF: 1 ML
WBC # CSF: 0 /CU MM

## 2017-07-31 PROCEDURE — 87205 SMEAR GRAM STAIN: CPT

## 2017-07-31 PROCEDURE — 77003 FLUOROGUIDE FOR SPINE INJECT: CPT | Mod: 26,,, | Performed by: RADIOLOGY

## 2017-07-31 PROCEDURE — 82945 GLUCOSE OTHER FLUID: CPT

## 2017-07-31 PROCEDURE — 89051 BODY FLUID CELL COUNT: CPT

## 2017-07-31 PROCEDURE — 84157 ASSAY OF PROTEIN OTHER: CPT

## 2017-07-31 PROCEDURE — 87070 CULTURE OTHR SPECIMN AEROBIC: CPT

## 2017-07-31 PROCEDURE — 62270 DX LMBR SPI PNXR: CPT | Mod: TC

## 2017-07-31 PROCEDURE — 62270 DX LMBR SPI PNXR: CPT | Mod: ,,, | Performed by: RADIOLOGY

## 2017-07-31 NOTE — H&P
Radiology History & Physical      SUBJECTIVE:     Chief Complaint: Headache    History of Present Illness:  Sarah Ulloa is a 37 y.o. female who presents for lumbar puncture under fluoroscopic guidance.    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 Authorizing Provider   buPROPion (WELLBUTRIN SR) 200 MG TbSR 200 mg 2 (two) times daily. 10/24/16   Historical Provider, MD   butalbital-acetaminophen-caffeine -40 mg (FIORICET, ESGIC) -40 mg per tablet TAKE ONE TABLET BY MOUTH TWICE DAILY AS NEEDED FOR PAIN 7/26/17   Nini Pryor MD   ferrous gluconate (FERGON) 325 MG Tab Take 1 tablet (325 mg total) by mouth 3 (three) times daily with meals. 6/5/17   Nini Pryor MD   lamotrigine (LAMICTAL) 150 MG Tab 150 mg 2 (two) times daily. 10/24/16   Historical Provider, MD   propranolol (INDERAL) 20 MG tablet Take 1 tablet (20 mg total) by mouth 2 (two) times daily. 5/29/17 5/29/18  Nini Pryor MD   SAPHRIS BLACK CHERRY, 5 mg Subl 5 mg once daily. 10/28/16   Historical Provider, MD   sumatriptan (IMITREX) 100 MG tablet 1 po at onset of headache; may repeat after 2 hours. Max 2/day or 10/week 6/5/17   Soledad Mendes MD   tizanidine (ZANAFLEX) 2 MG tablet  4/26/17   Historical Provider, MD   tramadol (ULTRAM) 50 mg tablet 50 mg daily as needed. One or two tablets PRN 10/25/16   Historical Provider, MD   triamcinolone acetonide 0.1% (KENALOG) 0.1 % cream   2/12/17   Historical Provider, MD   TRINTELLIX 10 mg Tab 10 mg once daily. 10/28/16   Historical Provider, MD     Anticoagulants/Antiplatelets: no anticoagulation    Allergies:   Review of patient's allergies indicates:   Allergen Reactions    Percocet [oxycodone-acetaminophen] Nausea And Vomiting    Sulfa (sulfonamide antibiotics) Rash    Phentermine Rash     Sedation History:  N/A    Review of Systems:   Hematological: Factor V deficiency   Respiratory: no shortness of breath  Cardiovascular: no chest pain  Gastrointestinal: no abdominal pain  Genito-Urinary: no dysuria  Musculoskeletal: negative  Neurological: Headache         OBJECTIVE:     Vital Signs (Most Recent)       Physical Exam:  ASA: N/A   Mallampati: N/A    General: no acute distress  Mental Status: alert and oriented to person, place and time  HEENT: normocephalic, atraumatic  Chest: unlabored breathing  Heart: regular heart rate  Abdomen: nondistended  Extremity: moves all extremities    Laboratory  Lab Results   Component Value Date    INR 1.0 08/16/2007       Lab Results   Component Value Date    WBC 8.86 05/31/2017    HGB 11.8 (L) 05/31/2017    HCT 36.9 (L) 05/31/2017    MCV 86 05/31/2017     05/31/2017      Lab Results   Component Value Date    GLU 99 05/31/2017     05/31/2017    K 3.9 05/31/2017     05/31/2017    CO2 22 (L) 05/31/2017    BUN 12 05/31/2017    CREATININE 0.9 05/31/2017    CALCIUM 9.0 05/31/2017    MG 1.8 03/05/2017    ALT 18 05/31/2017    AST 17 05/31/2017    ALBUMIN 3.5 05/31/2017    BILITOT 0.3 05/31/2017       ASSESSMENT/PLAN:     Sedation Plan: Local only  Patient will undergo lumbar puncture under fluoroscopic guidance.    Franchesca Reyes MD  Resident  Department of Radiology  Pager: [phone]

## 2017-08-01 ENCOUNTER — OFFICE VISIT (OUTPATIENT)
Dept: NEUROLOGY | Facility: CLINIC | Age: 37
End: 2017-08-01
Payer: COMMERCIAL

## 2017-08-01 ENCOUNTER — PATIENT MESSAGE (OUTPATIENT)
Dept: NEUROLOGY | Facility: CLINIC | Age: 37
End: 2017-08-01

## 2017-08-01 VITALS
HEIGHT: 60 IN | BODY MASS INDEX: 43.02 KG/M2 | HEART RATE: 77 BPM | WEIGHT: 219.13 LBS | DIASTOLIC BLOOD PRESSURE: 84 MMHG | SYSTOLIC BLOOD PRESSURE: 119 MMHG

## 2017-08-01 DIAGNOSIS — G93.2 PSEUDOTUMOR CEREBRI SYNDROME: ICD-10-CM

## 2017-08-01 PROCEDURE — 99213 OFFICE O/P EST LOW 20 MIN: CPT | Mod: S$GLB,,, | Performed by: PSYCHIATRY & NEUROLOGY

## 2017-08-01 PROCEDURE — 99999 PR PBB SHADOW E&M-EST. PATIENT-LVL III: CPT | Mod: PBBFAC,,, | Performed by: PSYCHIATRY & NEUROLOGY

## 2017-08-01 RX ORDER — SUMATRIPTAN SUCCINATE 100 MG/1
100 TABLET ORAL
Qty: 9 TABLET | Refills: 6 | Status: SHIPPED | OUTPATIENT
Start: 2017-08-01 | End: 2018-11-05 | Stop reason: SDUPTHER

## 2017-08-01 NOTE — PROCEDURES
Radiology Post-Procedure Note    Pre Op Diagnosis: Pseudotumor cerebri    Post Op Diagnosis: Same    Procedure: lumbar puncture    Procedure performed by: Mirian Panda MD, Franchesca Reyes MD     Written Informed Consent Obtained: Yes    Specimen Removed: 11 mL CSF    Estimated Blood Loss: Minimal    Findings: Following written informed consent and sterile prep and drape, a 22 gauge spinal needle was inserted at L4 - L5 intralaminar space under fluoroscopic surveillance.  11 mL clear CSF removed and sent to the lab for further analysis.  There were no complications.    Patient tolerated procedure well.    Franchesca Reyes MD  Resident  Department of Radiology  Pager: [phone]

## 2017-08-01 NOTE — PROGRESS NOTES
Subjective:       Patient ID: Sarah Ulloa is a 37 y.o. female.    Chief Complaint:  No chief complaint on file.      History of Present Illness  HPI  Headache  She complains of a of headache. She does have a headache at this time.    Description of Headaches:  Location of pain: bilateral, frontal, retro-orbital  Radiation of pain?:none  Character of pain:aching and tight band  Severity of pain: 8  Accompanying symptoms: neck stiffness        Review of SystemsReview of Systems   Constitutional: Positive for fatigue.   Eyes: Negative for photophobia and visual disturbance.   Gastrointestinal: Positive for nausea.   Neurological: Positive for headaches.   All other systems reviewed and are negative.      Objective:      Neurologic Exam     Mental Status   Oriented to person, place, and time.   Registration: recalls 3 of 3 objects. Recall at 5 minutes: recalls 3 of 3 objects. Follows 3 step commands.   Attention: normal. Concentration: normal.   Speech: speech is normal   Level of consciousness: alert  Knowledge: good. Able to perform simple calculations.   Able to name object. Able to read. Able to repeat. Able to write. Normal comprehension.     Cranial Nerves   Cranial nerves II through XII intact.     CN III, IV, VI   Pupils are equal, round, and reactive to light.  Extraocular motions are normal.     Motor Exam   Muscle bulk: normal  Overall muscle tone: normal    Strength   Strength 5/5 throughout.     Sensory Exam   Light touch normal.   Vibration normal.   Proprioception normal.   Pinprick normal.     Gait, Coordination, and Reflexes     Reflexes   Reflexes 2+ except as noted.       Physical Exam   Constitutional: She is oriented to person, place, and time. She appears well-developed and well-nourished.   HENT:   Head: Normocephalic.   Eyes: EOM are normal. Pupils are equal, round, and reactive to light.   Neck: Normal range of motion. Neck supple.   Cardiovascular: Normal rate and regular rhythm.     Musculoskeletal: Normal range of motion.   Neurological: She is alert and oriented to person, place, and time. She has normal strength and normal reflexes.   Psychiatric: Her speech is normal.   Vitals reviewed.        Assessment:     Problem List Items Addressed This Visit        Neuro    Pseudotumor cerebri syndrome     Opening pressure on 280 mm of CSF and  Closing pressure 15 mm.  Needs LP.  Will refer to opthalmology         Relevant Orders    Ambulatory Referral to Ophthalmology    FL Lumbar Puncture Bedside No Imaging      Other Visit Diagnoses    None.           Plan:     RTC 6 weeks

## 2017-08-01 NOTE — ASSESSMENT & PLAN NOTE
Opening pressure on 280 mm of CSF and  Closing pressure 15 mm.  Needs LP.  Will refer to opthalmology

## 2017-08-01 NOTE — LETTER
August 1, 2017      Nini Pryor MD  [address]  [place] [address]           [facility]  [address]  [place] [address]  Phone: [phone]          Patient: Sarah Ulloa   MR Number: [id number]   YOB: 1980   Date of Visit: 8/1/2017       Dear Dr. Nini Pryor:    Thank you for referring Sarah Ulloa to me for evaluation. Attached you will find relevant portions of my assessment and plan of care.    If you have questions, please do not hesitate to call me. I look forward to following Sarah Ulloa along with you.    Sincerely,    Soledad Mendes MD    Enclosure  CC:  No Recipients    If you would like to receive this communication electronically, please contact [email or web address] or [phone] to request more information on [employer] Link access.    For providers and/or their staff who would like to refer a patient to [facility], please contact us through our one-stop-shop provider referral line, [facility] [place], at [phone].    If you feel you have received this communication in error or would no longer like to receive these types of communications, please e-mail [email or web address]

## 2017-08-05 LAB
CMV SPEC QL SHELL VIAL CULT: NO GROWTH
GRAM STN SPEC: NORMAL

## 2017-08-07 ENCOUNTER — TELEPHONE (OUTPATIENT)
Dept: INTERNAL MEDICINE | Facility: CLINIC | Age: 37
End: 2017-08-07

## 2017-08-07 ENCOUNTER — PATIENT MESSAGE (OUTPATIENT)
Dept: OPHTHALMOLOGY | Facility: CLINIC | Age: 37
End: 2017-08-07

## 2017-08-07 ENCOUNTER — PATIENT MESSAGE (OUTPATIENT)
Dept: INTERNAL MEDICINE | Facility: CLINIC | Age: 37
End: 2017-08-07

## 2017-08-07 DIAGNOSIS — Z72.820 POOR SLEEP: Primary | ICD-10-CM

## 2017-08-07 RX ORDER — HYDROXYZINE HYDROCHLORIDE 10 MG/1
10 TABLET, FILM COATED ORAL 3 TIMES DAILY PRN
Qty: 30 TABLET | Refills: 0 | Status: SHIPPED | OUTPATIENT
Start: 2017-08-07 | End: 2017-08-28 | Stop reason: SDUPTHER

## 2017-08-08 ENCOUNTER — PATIENT MESSAGE (OUTPATIENT)
Dept: NEUROLOGY | Facility: HOSPITAL | Age: 37
End: 2017-08-08

## 2017-08-17 ENCOUNTER — INITIAL CONSULT (OUTPATIENT)
Dept: OPHTHALMOLOGY | Facility: CLINIC | Age: 37
End: 2017-08-17
Payer: COMMERCIAL

## 2017-08-17 ENCOUNTER — HOSPITAL ENCOUNTER (OUTPATIENT)
Dept: RADIOLOGY | Facility: HOSPITAL | Age: 37
Discharge: HOME | End: 2017-08-17
Attending: PSYCHIATRY & NEUROLOGY
Payer: COMMERCIAL

## 2017-08-17 ENCOUNTER — CLINICAL SUPPORT (OUTPATIENT)
Dept: OPHTHALMOLOGY | Facility: CLINIC | Age: 37
End: 2017-08-17
Payer: COMMERCIAL

## 2017-08-17 DIAGNOSIS — G93.2 BENIGN INTRACRANIAL HYPERTENSION: ICD-10-CM

## 2017-08-17 DIAGNOSIS — G93.2 PSEUDOTUMOR CEREBRI SYNDROME: ICD-10-CM

## 2017-08-17 PROCEDURE — 92004 COMPRE OPH EXAM NEW PT 1/>: CPT | Mod: S$GLB,,, | Performed by: OPHTHALMOLOGY

## 2017-08-17 PROCEDURE — 77003 FLUOROGUIDE FOR SPINE INJECT: CPT | Mod: 26,,, | Performed by: RADIOLOGY

## 2017-08-17 PROCEDURE — 62270 DX LMBR SPI PNXR: CPT | Mod: 26,,, | Performed by: RADIOLOGY

## 2017-08-17 PROCEDURE — 62270 DX LMBR SPI PNXR: CPT | Mod: TC

## 2017-08-17 PROCEDURE — 99999 PR PBB SHADOW E&M-EST. PATIENT-LVL II: CPT | Mod: PBBFAC,,, | Performed by: OPHTHALMOLOGY

## 2017-08-17 PROCEDURE — 92083 EXTENDED VISUAL FIELD XM: CPT | Mod: S$GLB,,, | Performed by: OPHTHALMOLOGY

## 2017-08-17 NOTE — PROGRESS NOTES
HPI     Referred by Dr.Jacqueline Mendes  Dx w/Pseudotumor Cerebri x few weeks ago.  Pt states constant headaches.3-4 on pain scale.  Pt states OU decrease in vision   Not taking Diamox; doing LP weekly 2nd one today  Review HVF    Opening pressure on LP 28 cm water. CT showed partially empty sella. No   pulsatile tinnitus, diplopia, or transient obscurations of vision.    Last edited by Shlomo Dela Cruz MD on 8/17/2017  9:59 AM. (History)            Assessment /Plan     For exam results, see Encounter Report.    Benign intracranial hypertension  -     [place] Visual Field - OU - Extended - Both Eyes      I found no papilledema on exam. I will repeat her exam and visual field testing in one month.

## 2017-08-17 NOTE — H&P
Radiology History & Physical      SUBJECTIVE:     Chief Complaint: Headache    History of Present Illness:  Sarah Ulloa is a 37 y.o. female with PMHx of IIHwho presents for lumbar puncture.      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 Authorizing Provider   buPROPion (WELLBUTRIN SR) 200 MG TbSR 200 mg 2 (two) times daily. 10/24/16   Historical Provider, MD   butalbital-acetaminophen-caffeine -40 mg (FIORICET, ESGIC) -40 mg per tablet TAKE ONE TABLET BY MOUTH TWICE DAILY AS NEEDED FOR PAIN 7/26/17   Nini Pryor MD   ferrous gluconate (FERGON) 325 MG Tab Take 1 tablet (325 mg total) by mouth 3 (three) times daily with meals. 6/5/17   Nini Pryor MD   hydrOXYzine HCl (ATARAX) 10 MG Tab Take 1 tablet (10 mg total) by mouth 3 (three) times daily as needed. 8/7/17   Nini Pryor MD   lamotrigine (LAMICTAL) 150 MG Tab 150 mg 2 (two) times daily. 10/24/16   Historical Provider, MD   propranolol (INDERAL) 20 MG tablet Take 1 tablet (20 mg total) by mouth 2 (two) times daily. 5/29/17 5/29/18  Nini Pryor MD   SAPHRIS BLACK CHERRY, 5 mg Subl 5 mg once daily. 10/28/16   Historical Provider, MD   sumatriptan (IMITREX) 100 MG tablet Take 1 tablet (100 mg total) by mouth every 2 (two) hours as needed for Migraine (Max 2 tabs per 24 hours and 12 tablets per week). 8/1/17 8/31/17  Soledad Mendes MD   tizanidine (ZANAFLEX) 2 MG tablet  4/26/17    Historical Provider, MD   tramadol (ULTRAM) 50 mg tablet 50 mg daily as needed. One or two tablets PRN 10/25/16   Historical Provider, MD   triamcinolone acetonide 0.1% (KENALOG) 0.1 % cream  2/12/17   Historical Provider, MD   TRINTELLIX 10 mg Tab 10 mg once daily. 10/28/16   Historical Provider, MD     Anticoagulants/Antiplatelets: no anticoagulation    Allergies:   Review of patient's allergies indicates:   Allergen Reactions    Percocet [oxycodone-acetaminophen] Nausea And Vomiting    Sulfa (sulfonamide antibiotics) Rash    Phentermine Rash     Sedation History:  N/A    Review of Systems:   Hematological: negative  no known coagulopathies  Respiratory: no cough, shortness of breath, or wheezing  no shortness of breath  Cardiovascular: negative  no chest pain  Gastrointestinal: negative  no abdominal pain  Genito-Urinary: negative  no dysuria  Musculoskeletal: negative  Neurological: headaches         OBJECTIVE:       Physical Exam:  ASA: N/A  Mallampati: N/A    General: no acute distress  Mental Status: alert and oriented to person, place and time  HEENT: normocephalic, atraumatic  Chest: unlabored breathing  Abdomen: nondistended  Extremity: moves all extremities    Laboratory  Lab Results   Component Value Date    INR 1.0 08/16/2007       Lab Results   Component Value Date    WBC 8.86 05/31/2017    HGB 11.8 (L) 05/31/2017    HCT 36.9 (L) 05/31/2017    MCV 86 05/31/2017     05/31/2017      Lab Results   Component Value Date    GLU 99 05/31/2017     05/31/2017    K 3.9 05/31/2017     05/31/2017    CO2 22 (L) 05/31/2017    BUN 12 05/31/2017    CREATININE 0.9 05/31/2017    CALCIUM 9.0 05/31/2017    MG 1.8 03/05/2017    ALT 18 05/31/2017    AST 17 05/31/2017    ALBUMIN 3.5 05/31/2017    BILITOT 0.3 05/31/2017       ASSESSMENT/PLAN:     Sedation Plan: None, local anesthesia    Patient will undergo fluoroscopic guided lumbar puncture with opening pressure and closing pressure  measurements.    Kiran Fontenot MD  Radiology, PGY - II  [phone]

## 2017-08-17 NOTE — PROGRESS NOTES
Radiology Post-Procedure Note    Pre Op Diagnosis: Idiopathic Intracranial Hypertension    Post Op Diagnosis: Same    Procedure: Lumbar puncture    Procedure performed by: Kiran Fontenot    Written Informed Consent Obtained: Yes    Specimen Removed: 11 mL CSF    Estimated Blood Loss: Minimal    Findings: Following written informed consent and sterile prep and drape, a 22 gauge spinal needle was inserted at L3 - L4 intralaminar space under fluoroscopic surveillance.  11 mL clear CSF removed. Opening pressure measured at 25 cm H20. Closing pressure measured at 13 cm H20.  Full report to follow. Pt. Instructed on post procedure care including but not limited to signs of infection, bleeding, headaches, and follow up with ordering physician.  There were no complications.    Patient tolerated procedure well.    Kiran Fontenot MD  Radiology, PGY - II  [phone]

## 2017-08-21 ENCOUNTER — OFFICE VISIT (OUTPATIENT)
Dept: NEUROLOGY | Facility: CLINIC | Age: 37
End: 2017-08-21
Payer: COMMERCIAL

## 2017-08-21 VITALS
SYSTOLIC BLOOD PRESSURE: 109 MMHG | HEIGHT: 60 IN | WEIGHT: 218.69 LBS | HEART RATE: 73 BPM | BODY MASS INDEX: 42.94 KG/M2 | DIASTOLIC BLOOD PRESSURE: 85 MMHG

## 2017-08-21 DIAGNOSIS — G97.1 HEADACHE, POST-LUMBAR PUNCTURE: Primary | ICD-10-CM

## 2017-08-21 PROCEDURE — 99213 OFFICE O/P EST LOW 20 MIN: CPT | Mod: S$GLB,,, | Performed by: PSYCHIATRY & NEUROLOGY

## 2017-08-21 PROCEDURE — 3008F BODY MASS INDEX DOCD: CPT | Mod: S$GLB,,, | Performed by: PSYCHIATRY & NEUROLOGY

## 2017-08-21 PROCEDURE — 99999 PR PBB SHADOW E&M-EST. PATIENT-LVL II: CPT | Mod: PBBFAC,,, | Performed by: PSYCHIATRY & NEUROLOGY

## 2017-08-21 RX ORDER — BUTALBITAL, ASPIRIN, CAFFEINE AND CODEINE PHOSPHATE 50; 325; 40; 30 MG/1; MG/1; MG/1; MG/1
1 CAPSULE ORAL EVERY 4 HOURS PRN
Qty: 12 CAPSULE | Refills: 1 | Status: SHIPPED | OUTPATIENT
Start: 2017-08-21 | End: 2017-08-31

## 2017-08-21 NOTE — ASSESSMENT & PLAN NOTE
Patient to take Tylenol #3 and coke with black coffee to increase ICP. No blood patch as she has pseudotumor cerebri.

## 2017-08-21 NOTE — PROGRESS NOTES
Subjective:       Patient ID: Sarah Ulloa is a 37 y.o. female.    Chief Complaint: post spinal headache    Had LP on Thursday and has had a horrible HA since Friday. HA is worse when upright or coughing or walking.      Review of Systems   Eyes: Positive for photophobia.   Neurological: Positive for headaches.       Objective:      Physical Exam   Constitutional: She appears well-developed and well-nourished.   Eyes: EOM are normal. Pupils are equal, round, and reactive to light.   Neck: Normal range of motion.   Vitals reviewed.      Assessment:       Problem List Items Addressed This Visit        Neuro    Headache, post-lumbar puncture - Primary     Patient to take Tylenol #3 and coke with black coffee to increase ICP. No blood patch as she has pseudotumor cerebri.           Other Visit Diagnoses    None.       Plan:       RTC 3 months

## 2017-08-23 ENCOUNTER — PATIENT MESSAGE (OUTPATIENT)
Dept: NEUROLOGY | Facility: CLINIC | Age: 37
End: 2017-08-23

## 2017-08-28 DIAGNOSIS — Z72.820 POOR SLEEP: ICD-10-CM

## 2017-08-28 RX ORDER — HYDROXYZINE HYDROCHLORIDE 10 MG/1
10 TABLET, FILM COATED ORAL 3 TIMES DAILY PRN
Qty: 30 TABLET | Refills: 0 | Status: SHIPPED | OUTPATIENT
Start: 2017-08-28 | End: 2018-02-21

## 2017-08-29 ENCOUNTER — OFFICE VISIT (OUTPATIENT)
Dept: NEUROLOGY | Facility: CLINIC | Age: 37
End: 2017-08-29
Payer: COMMERCIAL

## 2017-08-29 VITALS
SYSTOLIC BLOOD PRESSURE: 126 MMHG | WEIGHT: 219.13 LBS | DIASTOLIC BLOOD PRESSURE: 85 MMHG | BODY MASS INDEX: 43.02 KG/M2 | HEART RATE: 72 BPM | HEIGHT: 60 IN

## 2017-08-29 DIAGNOSIS — G93.2 PSEUDOTUMOR CEREBRI SYNDROME: ICD-10-CM

## 2017-08-29 DIAGNOSIS — G43.009 MIGRAINE WITHOUT AURA AND WITHOUT STATUS MIGRAINOSUS, NOT INTRACTABLE: ICD-10-CM

## 2017-08-29 PROCEDURE — 99999 PR PBB SHADOW E&M-EST. PATIENT-LVL II: CPT | Mod: PBBFAC,,, | Performed by: PSYCHIATRY & NEUROLOGY

## 2017-08-29 PROCEDURE — 99213 OFFICE O/P EST LOW 20 MIN: CPT | Mod: S$GLB,,, | Performed by: PSYCHIATRY & NEUROLOGY

## 2017-08-29 PROCEDURE — 3008F BODY MASS INDEX DOCD: CPT | Mod: S$GLB,,, | Performed by: PSYCHIATRY & NEUROLOGY

## 2017-08-29 RX ORDER — METHYLPREDNISOLONE 4 MG/1
TABLET ORAL
Qty: 1 PACKAGE | Refills: 0 | Status: SHIPPED | OUTPATIENT
Start: 2017-08-29 | End: 2017-09-19

## 2017-08-29 RX ORDER — ACETAZOLAMIDE 250 MG/1
250 TABLET ORAL 3 TIMES DAILY
Qty: 90 TABLET | Refills: 11 | Status: SHIPPED | OUTPATIENT
Start: 2017-08-29 | End: 2018-11-06

## 2017-08-29 NOTE — PROGRESS NOTES
Subjective:       Patient ID: Sarah Ulloa is a 37 y.o. female.    Chief Complaint: Pseudotumor cerebri    Patient c/o daily constant CADET. Thinks it is related to ICP. Has never tried medrol dose leeann. Takes Imitrex for most severe HA.      Review of Systems    Objective:      Physical Exam   Constitutional: She is oriented to person, place, and time. She appears well-developed and well-nourished.   HENT:   Head: Normocephalic and atraumatic.   Neurological: She is alert and oriented to person, place, and time. She has normal reflexes.   Vitals reviewed.      Assessment:       Problem List Items Addressed This Visit        Neuro    Migraine without aura and without status migrainosus, not intractable     Medrol dosepack to break siege of HA.         Pseudotumor cerebri syndrome     Will start Diamox 250 mg daily.           Other Visit Diagnoses    None.         Plan:     RTC 4 weeks

## 2017-08-29 NOTE — LETTER
September 5, 2017      Nini Pryor MD  [address] [place] [address]  [address]  [place] [address]           [facility]  [facility]  [place] [address]  Phone: [phone]          Patient: Sarah Ulloa   MR Number: [id number]   YOB: 1980   Date of Visit: 8/29/2017       Dear Dr. Nini Pryor:    Thank you for referring Sarah Ulloa to me for evaluation. Attached you will find relevant portions of my assessment and plan of care.    If you have questions, please do not hesitate to call me. I look forward to following Sarah Ulloa along with you.    Sincerely,    Arik Larson    Enclosure  CC:  No Recipients    If you would like to receive this communication electronically, please contact [email or web address] or [phone] to request more information on [employer] Link access.    For providers and/or their staff who would like to refer a patient to [facility], please contact us through our one-stop-shop provider referral line, Yoandy Freitas, at [phone].    If you feel you have received this communication in error or would no longer like to receive these types of communications, please e-mail [email or web address]

## 2017-09-01 ENCOUNTER — TELEPHONE (OUTPATIENT)
Dept: NEUROLOGY | Facility: CLINIC | Age: 37
End: 2017-09-01

## 2017-09-01 NOTE — TELEPHONE ENCOUNTER
Call returned/Spoke with representative who informed me that she could not help me because the caller did not provide a reference number.

## 2017-09-11 ENCOUNTER — PATIENT MESSAGE (OUTPATIENT)
Dept: NEUROLOGY | Facility: CLINIC | Age: 37
End: 2017-09-11

## 2017-09-18 ENCOUNTER — CLINICAL SUPPORT (OUTPATIENT)
Dept: OPHTHALMOLOGY | Facility: CLINIC | Age: 37
End: 2017-09-18
Payer: COMMERCIAL

## 2017-09-18 ENCOUNTER — OFFICE VISIT (OUTPATIENT)
Dept: OPHTHALMOLOGY | Facility: CLINIC | Age: 37
End: 2017-09-18
Payer: COMMERCIAL

## 2017-09-18 DIAGNOSIS — G93.2 BENIGN INTRACRANIAL HYPERTENSION: Primary | ICD-10-CM

## 2017-09-18 PROCEDURE — 92012 INTRM OPH EXAM EST PATIENT: CPT | Mod: S$GLB,,, | Performed by: OPHTHALMOLOGY

## 2017-09-18 PROCEDURE — 99999 PR PBB SHADOW E&M-EST. PATIENT-LVL III: CPT | Mod: PBBFAC,,, | Performed by: OPHTHALMOLOGY

## 2017-09-18 PROCEDURE — 92083 EXTENDED VISUAL FIELD XM: CPT | Mod: S$GLB,,, | Performed by: OPHTHALMOLOGY

## 2017-09-18 NOTE — PROGRESS NOTES
HPI     Referred by Dr.Jacqueline Mendes   Dx w/Pseudotumor Cerebri     Patient is here for a 1 month HVF review. Patient states she is also here   for a full eye exam. Patient states her vision has been stable since her   last visit.      No gtts.     I have personally interviewed the patient, reviewed the history and   examined the patient and agree with the technician's exam.    Last edited by Shlomo Dela Cruz MD on 9/18/2017  2:49 PM. (History)            Assessment /Plan     For exam results, see Encounter Report.    Benign intracranial hypertension  -     [facility] Visual Field - OU - Extended - Both Eyes      I found no papilledema today. Her visual function is stable. I will repeat her exam and visual field testing in two months.

## 2017-10-02 ENCOUNTER — PATIENT MESSAGE (OUTPATIENT)
Dept: NEUROLOGY | Facility: CLINIC | Age: 37
End: 2017-10-02

## 2017-10-18 ENCOUNTER — PATIENT MESSAGE (OUTPATIENT)
Dept: INTERNAL MEDICINE | Facility: CLINIC | Age: 37
End: 2017-10-18

## 2017-11-24 ENCOUNTER — PATIENT MESSAGE (OUTPATIENT)
Dept: INTERNAL MEDICINE | Facility: CLINIC | Age: 37
End: 2017-11-24

## 2017-12-26 ENCOUNTER — PATIENT MESSAGE (OUTPATIENT)
Dept: INTERNAL MEDICINE | Facility: CLINIC | Age: 37
End: 2017-12-26

## 2018-02-07 ENCOUNTER — PATIENT OUTREACH (OUTPATIENT)
Dept: INTERNAL MEDICINE | Facility: CLINIC | Age: 38
End: 2018-02-07

## 2018-02-07 NOTE — PROGRESS NOTES
[facility] is committed to your overall health.  To help you get the most out of each of your visits, we will review your information to make sure you are up to date on all of your recommended tests and/or procedures.       Your PCP  Nini Pryor MD   found that you may be due for:       Health Maintenance Due   Topic Date Due    Influenza Vaccine  08/01/2017             If you have had any of the above done at another facility, please bring the records or information with you so that your record at [facility] will be complete.  If you would like to schedule any of these, please contact me.     If you are currently taking medication, please bring it with you to your appointment for review.     Also, if you have any type of Advanced Directives, please bring them with you to your office visit so we may scan them into your chart.       Thank you for Choosing [facility] for your healthcare needs.        Additional Information  If you have questions, you can email [email or web address] or call [phone]  to talk to our [facility] staff. Remember, [facility] is NOT to be used for urgent needs. For medical emergencies, dial 911.

## 2018-02-21 ENCOUNTER — OFFICE VISIT (OUTPATIENT)
Dept: INTERNAL MEDICINE | Facility: CLINIC | Age: 38
End: 2018-02-21
Payer: COMMERCIAL

## 2018-02-21 VITALS
DIASTOLIC BLOOD PRESSURE: 68 MMHG | HEART RATE: 89 BPM | HEIGHT: 60 IN | SYSTOLIC BLOOD PRESSURE: 102 MMHG | BODY MASS INDEX: 40.53 KG/M2 | OXYGEN SATURATION: 97 % | WEIGHT: 206.44 LBS

## 2018-02-21 DIAGNOSIS — E04.1 THYROID NODULE: ICD-10-CM

## 2018-02-21 DIAGNOSIS — R79.89 LOW VITAMIN B12 LEVEL: ICD-10-CM

## 2018-02-21 DIAGNOSIS — E55.9 VITAMIN D DEFICIENCY: ICD-10-CM

## 2018-02-21 DIAGNOSIS — D68.51 FACTOR 5 LEIDEN MUTATION, HETEROZYGOUS: ICD-10-CM

## 2018-02-21 DIAGNOSIS — K14.1 GEOGRAPHIC TONGUE: ICD-10-CM

## 2018-02-21 DIAGNOSIS — R73.03 PREDIABETES: ICD-10-CM

## 2018-02-21 DIAGNOSIS — K14.6 TONGUE PAIN: ICD-10-CM

## 2018-02-21 DIAGNOSIS — Z00.00 WELLNESS EXAMINATION: Primary | ICD-10-CM

## 2018-02-21 DIAGNOSIS — E61.1 IRON DEFICIENCY: ICD-10-CM

## 2018-02-21 PROBLEM — F51.01 PRIMARY INSOMNIA: Status: RESOLVED | Noted: 2017-05-29 | Resolved: 2018-02-21

## 2018-02-21 PROCEDURE — 99395 PREV VISIT EST AGE 18-39: CPT | Mod: 25,S$GLB,, | Performed by: FAMILY MEDICINE

## 2018-02-21 PROCEDURE — 90686 IIV4 VACC NO PRSV 0.5 ML IM: CPT | Mod: S$GLB,,, | Performed by: FAMILY MEDICINE

## 2018-02-21 PROCEDURE — 90471 IMMUNIZATION ADMIN: CPT | Mod: S$GLB,,, | Performed by: FAMILY MEDICINE

## 2018-02-21 PROCEDURE — 99999 PR PBB SHADOW E&M-EST. PATIENT-LVL III: CPT | Mod: PBBFAC,,, | Performed by: FAMILY MEDICINE

## 2018-02-21 RX ORDER — TRIAMCINOLONE ACETONIDE 1 MG/G
PASTE DENTAL 2 TIMES DAILY
Qty: 5 G | Refills: 0 | Status: SHIPPED | OUTPATIENT
Start: 2018-02-21 | End: 2018-03-23

## 2018-02-21 NOTE — PROGRESS NOTES
After obtaining consent, and per orders of Dr. Pryor, injection of fluzone Lot [id number] Exp 6/30/18 given in the LD by JOJO TERAN. Patient tolerated well and band aid applied. Patient instructed to remain in clinic for 15 minutes afterwards, and to report any adverse reaction to me immediately.

## 2018-02-21 NOTE — PROGRESS NOTES
Subjective:       Patient ID: Sarah Ulloa is a 37 y.o. female.    Chief Complaint: Annual Exam    HPI  38 y/o female former smoker (quit 2012) with migraines, hx of GERD s/p Nissen in 3/2017, hx of thyroid nodules, Factor V Leiden def, CLBP, Bipolar 1, Pseudotumor Cerebri is here for annual exam. Has been recently placed on Diamox in October headaches much better, off propranolol, taking fioricet about 1 time a month, rare imitrex use, headaches relieved with tylenol.  Denies f/n/v/d/cp/sob/urinary sx. She is eating healthy low, lost about 29 pounds, not doing much activity.    Hx of thyroid nodules, due for ultrasound, never had biopsy  Bipolar 1: followed by Dr. Armstrong, doing well on lamotrigine, wellbutrin, trintellix  CLBP: she is off tramadol, takes zanaflex about 1 pill every 2-3 months, followed by Dr. Esquivel and Dr. Heredia, last ERLIN 1/25/18  GYN: followed by Dr. Pedersen at [facility], pap utd, paraguard placed in 12/2017  Eye exam utd  Dental utd  Vaccines: flu shot today  Derm: was seen by doc on [address] in the past     Review of Systems   Constitutional: Negative for activity change, appetite change, fatigue and fever.   Respiratory: Negative for cough and shortness of breath.    Cardiovascular: Negative for chest pain, palpitations and leg swelling.   Gastrointestinal: Negative for constipation, diarrhea, nausea and vomiting.   Genitourinary: Negative for difficulty urinating and dysuria.   Skin: Negative for rash.   Neurological: Negative for dizziness and light-headedness.   Psychiatric/Behavioral: Negative for sleep disturbance.       Objective:      /68   Pulse 89   Ht 5' (1.524 m)   Wt 93.6 kg (206 lb 7.4 oz)   LMP 01/22/2018 (Approximate)   SpO2 97%   BMI 40.32 kg/m²   Physical Exam   Constitutional: She appears well-developed and well-nourished.   HENT:   Head: Normocephalic and atraumatic.   Mouth/Throat: No oropharyngeal exudate.   Neck: Normal range of motion. Neck  supple. No thyromegaly present.   Cardiovascular: Normal rate, regular rhythm and normal heart sounds.    Pulmonary/Chest: Effort normal and breath sounds normal. No respiratory distress.   Abdominal: Soft. Bowel sounds are normal. She exhibits no distension. There is no tenderness.   Musculoskeletal: She exhibits no edema.   Lymphadenopathy:     She has no cervical adenopathy.   Neurological: She is alert.   Skin: Skin is warm and dry.   Psychiatric: She has a normal mood and affect.   Nursing note and vitals reviewed.      Assessment:       1. Wellness examination    2. Tongue pain    3. Geographic tongue    4. Iron deficiency    5. Vitamin D deficiency    6. Prediabetes    7. Factor 5 Leiden mutation, heterozygous    8. Thyroid nodule    9. Low vitamin B12 level        Plan:   Sarah was seen today for annual exam.    Diagnoses and all orders for this visit:    Wellness examination  -     Iron and TIBC; Future  -     Folate; Future  -     Ferritin; Future  -     Vitamin B12; Future  -     Reticulocytes; Future  -     CBC auto differential; Future  -     Comprehensive metabolic panel; Future  -     Hemoglobin A1c; Future  -     Lipid panel; Future  -     TSH; Future  -     Vitamin D; Future    Tongue pain  -     triamcinolone acetonide 0.1% (KENALOG) 0.1 % paste; Place onto teeth 2 (two) times daily.    Geographic tongue  -     triamcinolone acetonide 0.1% (KENALOG) 0.1 % paste; Place onto teeth 2 (two) times daily.    Iron deficiency  -     Iron and TIBC; Future  -     Ferritin; Future    Vitamin D deficiency  -     Vitamin D; Future    Prediabetes  -     Hemoglobin A1c; Future    Factor 5 Leiden mutation, heterozygous    Thyroid nodule  -     TSH; Future    Low vitamin B12 level  -     Vitamin B12; Future

## 2018-02-22 ENCOUNTER — PATIENT MESSAGE (OUTPATIENT)
Dept: INTERNAL MEDICINE | Facility: CLINIC | Age: 38
End: 2018-02-22

## 2018-02-26 ENCOUNTER — PATIENT MESSAGE (OUTPATIENT)
Dept: INTERNAL MEDICINE | Facility: CLINIC | Age: 38
End: 2018-02-26

## 2018-03-03 ENCOUNTER — PATIENT MESSAGE (OUTPATIENT)
Dept: INTERNAL MEDICINE | Facility: CLINIC | Age: 38
End: 2018-03-03

## 2018-03-03 ENCOUNTER — LAB VISIT (OUTPATIENT)
Dept: LAB | Facility: HOSPITAL | Age: 38
End: 2018-03-03
Attending: FAMILY MEDICINE
Payer: COMMERCIAL

## 2018-03-03 DIAGNOSIS — E61.1 IRON DEFICIENCY: ICD-10-CM

## 2018-03-03 DIAGNOSIS — Z00.00 WELLNESS EXAMINATION: ICD-10-CM

## 2018-03-03 DIAGNOSIS — E04.1 THYROID NODULE: ICD-10-CM

## 2018-03-03 DIAGNOSIS — R79.89 LOW VITAMIN B12 LEVEL: ICD-10-CM

## 2018-03-03 DIAGNOSIS — E55.9 VITAMIN D DEFICIENCY: ICD-10-CM

## 2018-03-03 DIAGNOSIS — R73.03 PREDIABETES: ICD-10-CM

## 2018-03-03 LAB
25(OH)D3+25(OH)D2 SERPL-MCNC: 24 NG/ML
ALBUMIN SERPL BCP-MCNC: 3.6 G/DL
ALP SERPL-CCNC: 90 U/L
ALT SERPL W/O P-5'-P-CCNC: 21 U/L
ANION GAP SERPL CALC-SCNC: 9 MMOL/L
AST SERPL-CCNC: 19 U/L
BASOPHILS # BLD AUTO: 0.02 K/UL
BASOPHILS NFR BLD: 0.3 %
BILIRUB SERPL-MCNC: 0.4 MG/DL
BUN SERPL-MCNC: 7 MG/DL
CALCIUM SERPL-MCNC: 9.3 MG/DL
CHLORIDE SERPL-SCNC: 111 MMOL/L
CHOLEST SERPL-MCNC: 201 MG/DL
CHOLEST/HDLC SERPL: 3.7 {RATIO}
CO2 SERPL-SCNC: 17 MMOL/L
CREAT SERPL-MCNC: 0.9 MG/DL
DIFFERENTIAL METHOD: ABNORMAL
EOSINOPHIL # BLD AUTO: 0.1 K/UL
EOSINOPHIL NFR BLD: 1.7 %
ERYTHROCYTE [DISTWIDTH] IN BLOOD BY AUTOMATED COUNT: 13.3 %
EST. GFR  (AFRICAN AMERICAN): >60 ML/MIN/1.73 M^2
EST. GFR  (NON AFRICAN AMERICAN): >60 ML/MIN/1.73 M^2
ESTIMATED AVG GLUCOSE: 105 MG/DL
FERRITIN SERPL-MCNC: 35 NG/ML
FOLATE SERPL-MCNC: 16.8 NG/ML
GLUCOSE SERPL-MCNC: 98 MG/DL
HBA1C MFR BLD HPLC: 5.3 %
HCT VFR BLD AUTO: 40.3 %
HDLC SERPL-MCNC: 55 MG/DL
HDLC SERPL: 27.4 %
HGB BLD-MCNC: 13.1 G/DL
IRON SERPL-MCNC: 127 UG/DL
LDLC SERPL CALC-MCNC: 124 MG/DL
LYMPHOCYTES # BLD AUTO: 1.2 K/UL
LYMPHOCYTES NFR BLD: 15.9 %
MCH RBC QN AUTO: 29.2 PG
MCHC RBC AUTO-ENTMCNC: 32.5 G/DL
MCV RBC AUTO: 90 FL
MONOCYTES # BLD AUTO: 0.4 K/UL
MONOCYTES NFR BLD: 5.5 %
NEUTROPHILS # BLD AUTO: 5.8 K/UL
NEUTROPHILS NFR BLD: 76.1 %
NONHDLC SERPL-MCNC: 146 MG/DL
NRBC BLD-RTO: 0 /100 WBC
PLATELET # BLD AUTO: 283 K/UL
PMV BLD AUTO: 9.5 FL
POTASSIUM SERPL-SCNC: 3.8 MMOL/L
PROT SERPL-MCNC: 7.2 G/DL
RBC # BLD AUTO: 4.49 M/UL
RETICS/RBC NFR AUTO: 1.2 %
SATURATED IRON: 32 %
SODIUM SERPL-SCNC: 137 MMOL/L
TOTAL IRON BINDING CAPACITY: 398 UG/DL
TRANSFERRIN SERPL-MCNC: 269 MG/DL
TRIGL SERPL-MCNC: 110 MG/DL
TSH SERPL DL<=0.005 MIU/L-ACNC: 0.74 UIU/ML
VIT B12 SERPL-MCNC: 317 PG/ML
WBC # BLD AUTO: 7.61 K/UL

## 2018-03-03 PROCEDURE — 84443 ASSAY THYROID STIM HORMONE: CPT

## 2018-03-03 PROCEDURE — 85045 AUTOMATED RETICULOCYTE COUNT: CPT

## 2018-03-03 PROCEDURE — 36415 COLL VENOUS BLD VENIPUNCTURE: CPT

## 2018-03-03 PROCEDURE — 80053 COMPREHEN METABOLIC PANEL: CPT

## 2018-03-03 PROCEDURE — 82306 VITAMIN D 25 HYDROXY: CPT

## 2018-03-03 PROCEDURE — 82728 ASSAY OF FERRITIN: CPT

## 2018-03-03 PROCEDURE — 83540 ASSAY OF IRON: CPT

## 2018-03-03 PROCEDURE — 85025 COMPLETE CBC W/AUTO DIFF WBC: CPT

## 2018-03-03 PROCEDURE — 82607 VITAMIN B-12: CPT

## 2018-03-03 PROCEDURE — 80061 LIPID PANEL: CPT

## 2018-03-03 PROCEDURE — 82746 ASSAY OF FOLIC ACID SERUM: CPT

## 2018-03-03 PROCEDURE — 83036 HEMOGLOBIN GLYCOSYLATED A1C: CPT

## 2018-03-05 ENCOUNTER — OFFICE VISIT (OUTPATIENT)
Dept: URGENT CARE | Facility: CLINIC | Age: 38
End: 2018-03-05
Payer: COMMERCIAL

## 2018-03-05 VITALS
TEMPERATURE: 97 F | SYSTOLIC BLOOD PRESSURE: 110 MMHG | DIASTOLIC BLOOD PRESSURE: 78 MMHG | OXYGEN SATURATION: 97 % | BODY MASS INDEX: 40.05 KG/M2 | WEIGHT: 204 LBS | RESPIRATION RATE: 16 BRPM | HEIGHT: 60 IN | HEART RATE: 94 BPM

## 2018-03-05 DIAGNOSIS — R68.83 CHILLS (WITHOUT FEVER): Primary | ICD-10-CM

## 2018-03-05 DIAGNOSIS — J01.00 ACUTE NON-RECURRENT MAXILLARY SINUSITIS: ICD-10-CM

## 2018-03-05 LAB
CTP QC/QA: YES
FLUAV AG NPH QL: NEGATIVE
FLUBV AG NPH QL: NEGATIVE

## 2018-03-05 PROCEDURE — 99214 OFFICE O/P EST MOD 30 MIN: CPT | Mod: 25,S$GLB,, | Performed by: NURSE PRACTITIONER

## 2018-03-05 PROCEDURE — 96372 THER/PROPH/DIAG INJ SC/IM: CPT | Mod: S$GLB,,, | Performed by: FAMILY MEDICINE

## 2018-03-05 PROCEDURE — 87804 INFLUENZA ASSAY W/OPTIC: CPT | Mod: QW,S$GLB,, | Performed by: NURSE PRACTITIONER

## 2018-03-05 RX ORDER — BETAMETHASONE SODIUM PHOSPHATE AND BETAMETHASONE ACETATE 3; 3 MG/ML; MG/ML
6 INJECTION, SUSPENSION INTRA-ARTICULAR; INTRALESIONAL; INTRAMUSCULAR; SOFT TISSUE
Status: COMPLETED | OUTPATIENT
Start: 2018-03-05 | End: 2018-03-05

## 2018-03-05 RX ORDER — AZITHROMYCIN 250 MG/1
250 TABLET, FILM COATED ORAL DAILY
Qty: 5 TABLET | Refills: 0 | Status: SHIPPED | OUTPATIENT
Start: 2018-03-05 | End: 2018-03-10

## 2018-03-05 RX ORDER — ERGOCALCIFEROL 1.25 MG/1
50000 CAPSULE ORAL
COMMUNITY
End: 2019-03-26

## 2018-03-05 RX ADMIN — BETAMETHASONE SODIUM PHOSPHATE AND BETAMETHASONE ACETATE 6 MG: 3; 3 INJECTION, SUSPENSION INTRA-ARTICULAR; INTRALESIONAL; INTRAMUSCULAR; SOFT TISSUE at 06:03

## 2018-03-05 NOTE — PROGRESS NOTES
Subjective:       Patient ID: Sarah Ulloa is a 37 y.o. female.    Vitals:  height is 5' (1.524 m) and weight is 92.5 kg (204 lb). Her temporal temperature is 97.1 °F (36.2 °C). Her blood pressure is 110/78 and her pulse is 94. Her respiration is 16 and oxygen saturation is 97%.     Chief Complaint: Cough    Cough   This is a new problem. Episode onset: 3 days. The problem has been gradually worsening. The problem occurs every few minutes. The cough is productive of sputum. Associated symptoms include chills, ear congestion, ear pain, myalgias and nasal congestion. Pertinent negatives include no chest pain, eye redness, fever, headaches, sore throat, shortness of breath or wheezing. Nothing aggravates the symptoms. She has tried nothing for the symptoms.     Review of Systems   Constitution: Positive for chills and malaise/fatigue. Negative for fever.   HENT: Positive for congestion and ear pain. Negative for hoarse voice and sore throat.    Eyes: Negative for discharge and redness.   Cardiovascular: Negative for chest pain, dyspnea on exertion and leg swelling.   Respiratory: Positive for cough and sputum production. Negative for shortness of breath and wheezing.    Musculoskeletal: Positive for back pain and myalgias.   Gastrointestinal: Negative for abdominal pain and nausea.   Neurological: Negative for headaches.       Objective:      Physical Exam   Constitutional: She is oriented to person, place, and time. She appears well-developed and well-nourished. She is cooperative.  Non-toxic appearance. She does not appear ill. No distress.   HENT:   Head: Normocephalic and atraumatic.   Right Ear: Hearing, tympanic membrane, external ear and ear canal normal.   Left Ear: Hearing, tympanic membrane, external ear and ear canal normal.   Nose: No mucosal edema, rhinorrhea or nasal deformity. No epistaxis. Right sinus exhibits maxillary sinus tenderness. Right sinus exhibits no frontal sinus tenderness. Left  sinus exhibits maxillary sinus tenderness. Left sinus exhibits no frontal sinus tenderness.   Mouth/Throat: Uvula is midline, oropharynx is clear and moist and mucous membranes are normal. No trismus in the jaw. Normal dentition. No uvula swelling. No posterior oropharyngeal erythema.   Eyes: Conjunctivae and lids are normal. No scleral icterus.   Sclera clear bilat   Neck: Trachea normal, full passive range of motion without pain and phonation normal. Neck supple.   Cardiovascular: Normal rate, regular rhythm, normal heart sounds, intact distal pulses and normal pulses.    Pulmonary/Chest: Effort normal and breath sounds normal. No respiratory distress. She has no decreased breath sounds. She has no wheezes. She has no rhonchi. She has no rales.   Abdominal: Soft. Normal appearance and bowel sounds are normal. She exhibits no distension. There is no tenderness.   Musculoskeletal: Normal range of motion. She exhibits no edema or deformity.   Neurological: She is alert and oriented to person, place, and time. She exhibits normal muscle tone. Coordination normal.   Skin: Skin is warm, dry and intact. She is not diaphoretic. No pallor.   Psychiatric: She has a normal mood and affect. Her speech is normal and behavior is normal. Judgment and thought content normal. Cognition and memory are normal.   Nursing note and vitals reviewed.      Assessment:       1. Chills (without fever)    2. Acute non-recurrent maxillary sinusitis        Plan:         Chills (without fever)  -     POCT Influenza A/B    Acute non-recurrent maxillary sinusitis  -     betamethasone acetate-betamethasone sodium phosphate injection 6 mg; Inject 1 mL (6 mg total) into the muscle one time.  -     azithromycin (ZITHROMAX Z-ZAIDA) 250 MG tablet; Take 1 tablet (250 mg total) by mouth once daily.  Dispense: 5 tablet; Refill: 0        Follow-up with primary care in 3-5 days.

## 2018-03-06 NOTE — PATIENT INSTRUCTIONS
Sinusitis (Antibiotic Treatment)    The sinuses are air-filled spaces within the bones of the face. They connect to the inside of the nose. Sinusitis is an inflammation of the tissue lining the sinus cavity. Sinus inflammation can occur during a cold. It can also be due to allergies to pollens and other particles in the air. Sinusitis can cause symptoms of sinus congestion and fullness. A sinus infection causes fever, headache and facial pain. There is often green or yellow drainage from the nose or into the back of the throat (post-nasal drip). You have been given antibiotics to treat this condition.  Home care:  · Take the full course of antibiotics as instructed. Do not stop taking them, even if you feel better.  · Drink plenty of water, hot tea, and other liquids. This may help thin mucus. It also may promote sinus drainage.  · Heat may help soothe painful areas of the face. Use a towel soaked in hot water. Or,  the shower and direct the hot spray onto your face. Using a vaporizer along with a menthol rub at night may also help.   · An expectorant containing guaifenesin may help thin the mucus and promote drainage from the sinuses.  · Over-the-counter decongestants may be used unless a similar medicine was prescribed. Nasal sprays work the fastest. Use one that contains phenylephrine or oxymetazoline. First blow the nose gently. Then use the spray. Do not use these medicines more often than directed on the label or symptoms may get worse. You may also use tablets containing pseudoephedrine. Avoid products that combine ingredients, because side effects may be increased. Read labels. You can also ask the pharmacist for help. (NOTE: Persons with high blood pressure should not use decongestants. They can raise blood pressure.)  · Over-the-counter antihistamines may help if allergies contributed to your sinusitis.    · Do not use nasal rinses or irrigation during an acute sinus infection, unless told to by  your health care provider. Rinsing may spread the infection to other sinuses.  · Use acetaminophen or ibuprofen to control pain, unless another pain medicine was prescribed. (If you have chronic liver or kidney disease or ever had a stomach ulcer, talk with your doctor before using these medicines. Aspirin should never be used in anyone under 18 years of age who is ill with a fever. It may cause severe liver damage.)  · Don't smoke. This can worsen symptoms.  Follow-up care  Follow up with your healthcare provider or our staff if you are not improving within the next week.  When to seek medical advice  Call your healthcare provider if any of these occur:  · Facial pain or headache becoming more severe  · Stiff neck  · Unusual drowsiness or confusion  · Swelling of the forehead or eyelids  · Vision problems, including blurred or double vision  · Fever of 100.4ºF (38ºC) or higher, or as directed by your healthcare provider  · Seizure  · Breathing problems  · Symptoms not resolving within 10 days  Date Last Reviewed: 4/13/2015  © 3457-0116 [employer]. [address] [place] [address], [place] [address]. All rights reserved. This information is not intended as a substitute for professional medical care. Always follow your healthcare professional's instructions.

## 2018-03-12 ENCOUNTER — PATIENT MESSAGE (OUTPATIENT)
Dept: INTERNAL MEDICINE | Facility: CLINIC | Age: 38
End: 2018-03-12

## 2018-04-02 ENCOUNTER — TELEPHONE (OUTPATIENT)
Dept: DERMATOLOGY | Facility: CLINIC | Age: 38
End: 2018-04-02

## 2018-04-02 NOTE — TELEPHONE ENCOUNTER
Spoke to pt., she will keep appointment. This will be for her consultation. Then can schedule procedure.

## 2018-04-13 ENCOUNTER — PATIENT MESSAGE (OUTPATIENT)
Dept: INTERNAL MEDICINE | Facility: CLINIC | Age: 38
End: 2018-04-13

## 2018-06-04 PROBLEM — J01.00 ACUTE NON-RECURRENT MAXILLARY SINUSITIS: Status: RESOLVED | Noted: 2018-03-05 | Resolved: 2018-06-04

## 2018-07-06 ENCOUNTER — PATIENT MESSAGE (OUTPATIENT)
Dept: INTERNAL MEDICINE | Facility: CLINIC | Age: 38
End: 2018-07-06

## 2018-07-06 DIAGNOSIS — K14.1 GEOGRAPHIC TONGUE: Primary | ICD-10-CM

## 2018-07-09 RX ORDER — TRIAMCINOLONE ACETONIDE 1 MG/G
PASTE DENTAL 2 TIMES DAILY
Qty: 5 G | Refills: 3 | Status: SHIPPED | OUTPATIENT
Start: 2018-07-09 | End: 2018-08-08

## 2018-09-17 ENCOUNTER — PATIENT MESSAGE (OUTPATIENT)
Dept: INTERNAL MEDICINE | Facility: CLINIC | Age: 38
End: 2018-09-17

## 2018-09-18 ENCOUNTER — OFFICE VISIT (OUTPATIENT)
Dept: INTERNAL MEDICINE | Facility: CLINIC | Age: 38
End: 2018-09-18
Payer: COMMERCIAL

## 2018-09-18 VITALS
OXYGEN SATURATION: 99 % | TEMPERATURE: 99 F | BODY MASS INDEX: 41.72 KG/M2 | SYSTOLIC BLOOD PRESSURE: 110 MMHG | WEIGHT: 212.5 LBS | HEIGHT: 60 IN | HEART RATE: 102 BPM | DIASTOLIC BLOOD PRESSURE: 80 MMHG

## 2018-09-18 DIAGNOSIS — J06.9 UPPER RESPIRATORY TRACT INFECTION, UNSPECIFIED TYPE: Primary | ICD-10-CM

## 2018-09-18 PROCEDURE — 99999 PR PBB SHADOW E&M-EST. PATIENT-LVL III: CPT | Mod: PBBFAC,,, | Performed by: INTERNAL MEDICINE

## 2018-09-18 PROCEDURE — 99213 OFFICE O/P EST LOW 20 MIN: CPT | Mod: S$GLB,,, | Performed by: INTERNAL MEDICINE

## 2018-09-18 PROCEDURE — 3008F BODY MASS INDEX DOCD: CPT | Mod: CPTII,S$GLB,, | Performed by: INTERNAL MEDICINE

## 2018-09-18 RX ORDER — HYDROCODONE BITARTRATE AND HOMATROPINE METHYLBROMIDE ORAL SOLUTION 5; 1.5 MG/5ML; MG/5ML
5 LIQUID ORAL EVERY 4 HOURS PRN
Qty: 120 ML | Refills: 0 | Status: SHIPPED | OUTPATIENT
Start: 2018-09-18 | End: 2018-10-12

## 2018-09-18 NOTE — PROGRESS NOTES
Subjective:       Patient ID: Sarah Ulloa is a 38 y.o. female.    Chief Complaint: Cough; Fever; and Chills    HPI - Ms Ulloa has a URI.  She's had a productive cough for the past two days; fever to 101.4 at home last night.  None today.  Mild dyspnea when walking.  No hemoptysis or sore throat.  She has been taking dayquil without relief.  No ill exposures    PMH/Meds:  Reviewed and reconciled in EPIC with patient during visit today.    Review of Systems   Constitutional: Positive for fever.   HENT: Positive for congestion. Negative for sore throat.    Respiratory: Positive for cough.    Cardiovascular: Negative for chest pain.   Gastrointestinal: Negative for diarrhea.   Musculoskeletal: Negative for arthralgias.   Skin: Negative for rash.   Neurological: Negative for headaches.   Psychiatric/Behavioral: Negative for sleep disturbance.       Objective:      Physical Exam   Constitutional: She is oriented to person, place, and time. She appears well-developed and well-nourished.   HENT:   Head: Normocephalic and atraumatic.   Right Ear: External ear normal.   Left Ear: External ear normal.   Mouth/Throat: Oropharynx is clear and moist. No oropharyngeal exudate.   Neck: No thyromegaly present.   Cardiovascular: Normal rate, regular rhythm and normal heart sounds. Exam reveals no gallop and no friction rub.   No murmur heard.  Pulmonary/Chest: Effort normal and breath sounds normal. No respiratory distress. She has no wheezes. She has no rales. She exhibits no tenderness.   Lymphadenopathy:     She has no cervical adenopathy.   Neurological: She is alert and oriented to person, place, and time.   Skin: Skin is warm and dry. No erythema.   Psychiatric: She has a normal mood and affect.   Nursing note and vitals reviewed.      Assessment:       1. Upper respiratory tract infection, unspecified type        Plan:       Sarah was seen today for cough, fever and chills.    Diagnoses and all orders for this  visit:    Upper respiratory tract infection, unspecified type - Will give a good cough syrup.  Stay warm, dry.  Fluids, tylenol for aches and pains.  Rest, stay off work until recovered.  -     hydrocodone-homatropine 5-1.5 mg/5 ml (HYCODAN) 5-1.5 mg/5 mL Syrp; Take 5 mLs by mouth every 4 (four) hours as needed.    rtc prn.    G Eleni Álvarez MD MPH  Staff Internist

## 2018-09-20 ENCOUNTER — PATIENT MESSAGE (OUTPATIENT)
Dept: INTERNAL MEDICINE | Facility: CLINIC | Age: 38
End: 2018-09-20

## 2018-10-15 ENCOUNTER — OFFICE VISIT (OUTPATIENT)
Dept: INTERNAL MEDICINE | Facility: CLINIC | Age: 38
End: 2018-10-15
Payer: COMMERCIAL

## 2018-10-15 VITALS
OXYGEN SATURATION: 98 % | HEIGHT: 60 IN | BODY MASS INDEX: 41.77 KG/M2 | DIASTOLIC BLOOD PRESSURE: 72 MMHG | SYSTOLIC BLOOD PRESSURE: 104 MMHG | WEIGHT: 212.75 LBS | HEART RATE: 87 BPM

## 2018-10-15 DIAGNOSIS — J31.0 RHINITIS, UNSPECIFIED TYPE: Primary | ICD-10-CM

## 2018-10-15 DIAGNOSIS — D68.51 FACTOR 5 LEIDEN MUTATION, HETEROZYGOUS: ICD-10-CM

## 2018-10-15 PROCEDURE — 90471 IMMUNIZATION ADMIN: CPT | Mod: S$GLB,,, | Performed by: FAMILY MEDICINE

## 2018-10-15 PROCEDURE — 3008F BODY MASS INDEX DOCD: CPT | Mod: CPTII,S$GLB,, | Performed by: FAMILY MEDICINE

## 2018-10-15 PROCEDURE — 99999 PR PBB SHADOW E&M-EST. PATIENT-LVL IV: CPT | Mod: PBBFAC,,, | Performed by: FAMILY MEDICINE

## 2018-10-15 PROCEDURE — 90686 IIV4 VACC NO PRSV 0.5 ML IM: CPT | Mod: S$GLB,,, | Performed by: FAMILY MEDICINE

## 2018-10-15 PROCEDURE — 99213 OFFICE O/P EST LOW 20 MIN: CPT | Mod: 25,S$GLB,, | Performed by: FAMILY MEDICINE

## 2018-10-15 RX ORDER — MUPIROCIN 20 MG/G
OINTMENT TOPICAL 3 TIMES DAILY
Qty: 15 G | Refills: 0 | Status: SHIPPED | OUTPATIENT
Start: 2018-10-15 | End: 2019-01-08

## 2018-10-15 RX ORDER — ACETAMINOPHEN AND CODEINE PHOSPHATE 120; 12 MG/5ML; MG/5ML
SOLUTION ORAL DAILY
COMMUNITY
Start: 2018-10-09 | End: 2021-09-16

## 2018-10-15 NOTE — PROGRESS NOTES
Subjective:       Patient ID: Sarah Ulloa is a 38 y.o. female.    Chief Complaint: patient wants to discuss birth control    HPI  37 y/o female former smoker (quit 2012) with migraines, hx of GERD s/p Nissen in 3/2017, hx of thyroid nodules, Factor V Leiden def (dx in 2006), CLBP, Bipolar 1, Pseudotumor Cerebri is here for follow up.  She has started Micronor since August.  She is doing well, her headaches are well controlled since starting Diamox, no Fioricet use, rare Imitrex use. Denies f/n/v/d/cp/sob/urinary sx. She is eating healthy, she is not doing dedicated exercise, active at work.  She is sleeping well.  R side nasal discomfort off and on for years, has been more frequent over the past few weeks, feels like something is stabbing her, mild runny nose for the past few weeks that is improving  Hx of thyroid nodules, due for ultrasound, never had biopsy  Bipolar 1: followed by Dr. Mata, doing well on lamotrigine, wellbutrin, trintellix  CLBP: she is off tramadol, takes zanaflex about 1 pill every 2-3 months, followed by Dr. Esquivel and Dr. Heredia, getting periodic neck injections  GYN: followed by Dr. Pedersen at [facility], on Micronor for the past few months  Eye exam utd  Dental utd  Vaccines: flu shot today      Review of Systems   Constitutional: Negative for activity change, appetite change, fatigue and fever.   HENT: Positive for rhinorrhea. Negative for nosebleeds, postnasal drip, sinus pressure, sneezing and sore throat.         Nasal discomfort     Respiratory: Negative for cough and shortness of breath.    Cardiovascular: Negative for chest pain, palpitations and leg swelling.   Gastrointestinal: Negative for constipation, diarrhea, nausea and vomiting.   Genitourinary: Negative for difficulty urinating and dysuria.   Skin: Negative for rash.   Neurological: Negative for dizziness and light-headedness.   Psychiatric/Behavioral: Negative for sleep disturbance.       Objective:       /72 (BP Location: Right arm, Patient Position: Sitting, BP Method: X-Large (Manual))   Pulse 87   Ht 5' (1.524 m)   Wt 96.5 kg (212 lb 11.9 oz)   LMP 09/14/2018   SpO2 98%   BMI 41.55 kg/m²   Physical Exam   Constitutional: She appears well-developed and well-nourished.   HENT:   Head: Normocephalic and atraumatic.   Nose: Mucosal edema, rhinorrhea and nose lacerations present.   Mouth/Throat: No oropharyngeal exudate.   Nasal erythema    Neck: Normal range of motion. Neck supple. No thyromegaly present.   Cardiovascular: Normal rate, regular rhythm and normal heart sounds.   Pulmonary/Chest: Effort normal and breath sounds normal. No respiratory distress.   Musculoskeletal: She exhibits no edema.   Lymphadenopathy:     She has no cervical adenopathy.   Neurological: She is alert.   Skin: Skin is warm and dry.   Psychiatric: She has a normal mood and affect.   Nursing note and vitals reviewed.      Assessment:       1. Rhinitis, unspecified type    2. Factor 5 Leiden mutation, heterozygous        Plan:   Sarah was seen today for patient wants to discuss birth control.    Diagnoses and all orders for this visit:    Rhinitis, unspecified type  -     mupirocin (BACTROBAN) 2 % ointment; Apply topically 3 (three) times daily.    Factor 5 Leiden mutation, heterozygous  -     Ambulatory referral to Hematology / Oncology

## 2018-10-15 NOTE — PROGRESS NOTES
After obtaining consent, and per orders of Dr. Pryor, injection of fluzone Lot [id number] 6/30/19 given in the LD by JOJO TERAN. Patient tolerated well and band aid applied. Patient instructed to remain in clinic for 15 minutes afterwards, and to report any adverse reaction to me immediately.

## 2018-11-05 ENCOUNTER — PATIENT MESSAGE (OUTPATIENT)
Dept: INTERNAL MEDICINE | Facility: CLINIC | Age: 38
End: 2018-11-05

## 2018-11-05 ENCOUNTER — TELEPHONE (OUTPATIENT)
Dept: INTERNAL MEDICINE | Facility: CLINIC | Age: 38
End: 2018-11-05

## 2018-11-05 RX ORDER — SUMATRIPTAN SUCCINATE 100 MG/1
100 TABLET ORAL
Qty: 9 TABLET | Refills: 6 | Status: SHIPPED | OUTPATIENT
Start: 2018-11-05 | End: 2020-06-23

## 2018-11-06 ENCOUNTER — OFFICE VISIT (OUTPATIENT)
Dept: INTERNAL MEDICINE | Facility: CLINIC | Age: 38
End: 2018-11-06
Payer: COMMERCIAL

## 2018-11-06 VITALS
HEIGHT: 60 IN | OXYGEN SATURATION: 98 % | SYSTOLIC BLOOD PRESSURE: 110 MMHG | BODY MASS INDEX: 42.2 KG/M2 | HEART RATE: 112 BPM | WEIGHT: 214.94 LBS | DIASTOLIC BLOOD PRESSURE: 80 MMHG

## 2018-11-06 DIAGNOSIS — G43.009 MIGRAINE WITHOUT AURA AND WITHOUT STATUS MIGRAINOSUS, NOT INTRACTABLE: Primary | ICD-10-CM

## 2018-11-06 DIAGNOSIS — G93.2 PSEUDOTUMOR CEREBRI SYNDROME: ICD-10-CM

## 2018-11-06 PROCEDURE — 99999 PR PBB SHADOW E&M-EST. PATIENT-LVL IV: CPT | Mod: PBBFAC,,, | Performed by: FAMILY MEDICINE

## 2018-11-06 PROCEDURE — 3008F BODY MASS INDEX DOCD: CPT | Mod: CPTII,S$GLB,, | Performed by: FAMILY MEDICINE

## 2018-11-06 PROCEDURE — 99213 OFFICE O/P EST LOW 20 MIN: CPT | Mod: S$GLB,,, | Performed by: FAMILY MEDICINE

## 2018-11-06 RX ORDER — ZOLMITRIPTAN 5 MG/1
1 SPRAY NASAL ONCE AS NEEDED
Qty: 6 EACH | Refills: 1 | Status: SHIPPED | OUTPATIENT
Start: 2018-11-06 | End: 2019-01-08

## 2018-11-06 NOTE — PATIENT INSTRUCTIONS
Zolmitriptan Nasal Spray   What is this medicine?  ZOLMITRIPTAN (zohl mi TRIP tan) is used to treat migraines with or without aura. An aura is a strange feeling or visual disturbance that warns you of an attack. It is not used to prevent migraines.  How should I use this medicine?  This medicine is for use in the nose. Follow the directions on the prescription label. This medicine is taken at the first symptoms of a migraine. It is not for everyday use. Do not take your medicine more often than directed.  Talk to your pediatrician regarding the use of this medicine in children. While this drug may be prescribed for children as young as 12 years for selected conditions, precautions do apply.  What side effects may I notice from receiving this medicine?  Side effects that you should report to your doctor or health care professional as soon as possible:  · allergic reactions like skin rash, itching or hives, swelling of the face, lips, or tongue  · fast, slow, or irregular heart beat  · increased blood pressure  · palpitations  · severe stomach pain and cramping, bloody diarrhea  · signs and symptoms of a blood clot such as breathing problems; changes in vision; chest pain; severe, sudden headache; pain, swelling, warmth in the leg; trouble speaking; sudden numbness or weakness of the face, arm or leg  · tingling, pain, or numbness in the face, hands, or feet  Side effects that usually do not require medical attention (report to your doctor or health care professional if they continue or are bothersome):  · dry mouth  · feeling warm, flushing, or redness of the face  · headache  · muscle cramps, pain  · nausea, vomiting  · unusually tired or weak  What may interact with this medicine?  Do not take this medicine with any of the following medicines:  · amphetamine or cocaine  · dihydroergotamine, ergotamine, ergoloid mesylates, methysergide, or ergot-type medication - do not take within 24 hours of taking  zolmitriptan  · feverfew  · MAOIs like Carbex, Eldepryl, Marplan, Nardil, and Parnate - do not take zolmitriptan within 2 weeks of stopping MAOI therapy  · other migraine medicines like almotriptan, eletriptan, naratriptan, rizatriptan, sumatriptan - do not take within 24 hours of taking zolmitriptan  · tryptophan  This medicine may also interact with the following medications:  · cimetidine  · birth control pills  · medicines for mental depression, anxiety or mood problems  · propranolol  What if I miss a dose?  This does not apply; this medicine is not for regular use.  Where should I keep my medicine?  Keep out of the reach of children.  Store at room temperature between 20 and 25 degrees C (68 and 77 degrees F). Throw away any unused medicine after the expiration date.  What should I tell my health care provider before I take this medicine?  They need to know if you have any of these conditions:  · bowel disease, colitis or bloody diarrhea  · diabetes  · family history of heart disease  · fast or irregular heartbeat  · headaches that are different from your usual migraine  · heart or blood vessel disease, angina (chest pain), or previous heart attack  · high blood pressure  · high cholesterol  · history of stroke, transient ischemic attacks (TIAs or mini-strokes), or intracranial bleeding  · kidney or liver disease  · overweight  · postmenopausal or surgical removal of uterus and ovaries  · seizure disorder  · tobacco smoker  · an unusual or allergic reaction to zolmitriptan, other medicines, foods, dyes, or preservatives  · pregnant or trying to get pregnant  · breast-feeding  What should I watch for while using this medicine?  Only take this medicine for a migraine headache. Take it if you get warning symptoms or at the start of a migraine attack. It is not for regular use to prevent migraine attacks.  You may get drowsy or dizzy. Do not drive, use machinery, or do anything that needs mental alertness until  you know how this medicine affects you. To reduce dizzy or fainting spells, do not sit or stand up quickly, especially if you are an older patient. Alcohol can increase drowsiness, dizziness and flushing. Avoid alcoholic drinks.  Smoking cigarettes may increase the risk of heart-related side effects from using this medicine.  If you take migraine medicines for 10 or more days a month, your migraines may get worse. Keep a diary of headache days and medicine use. Contact your healthcare professional if your migraine attacks occur more frequently.  NOTE:This sheet is a summary. It may not cover all possible information. If you have questions about this medicine, talk to your doctor, pharmacist, or health care provider. Copyright© 2017 [employer]

## 2018-11-06 NOTE — PROGRESS NOTES
[facility]  Clinic Note      Subjective:       Patient ID: Sarah Ulloa is a 38 y.o. female.    Chief Complaint: Headache; Sore Throat; Cough; Nasal Congestion; and Nausea    Patient is here today for an acute visit.  She notes six days of headache, which has features similar to headaches in the past, but unlike her typical migraine headache.  She has history of chronic migraine, as well as pseudotumor cerebri diagnosed last July.  She describes focal, sharp headache over the right eyebrow.  The headache is non-radiating, with associated nausea, photophobia, and hypersensitivity to smell.  She denies any vomiting (is s/p fundoplication), mental status changes, or neurologic deficit.  The headache has not woken her up from sleep, however, she has been going to bed and waking up with the headache.  She has tried doses of Imitrex, which are helpful but only temporarily.  She notes URI in September, no recent fever or illness.  She describes some intense nasal itching and rhinorrhea, but no sinus pain or pressure.      Migraine    This is a recurrent problem. The current episode started in the past 7 days. The problem occurs constantly. The pain is located in the right unilateral region. The pain quality is similar to prior headaches (elements are similar). The quality of the pain is described as sharp and stabbing. The pain is severe. Associated symptoms include facial sweating, nausea, photophobia and rhinorrhea. Pertinent negatives include no coughing, dizziness, fever, sinus pressure, sore throat or vomiting. She has tried triptans for the symptoms. The treatment provided significant (transient) relief.     Review of Systems   Constitutional: Negative for fever.   HENT: Positive for congestion and rhinorrhea. Negative for sinus pressure, sinus pain and sore throat.    Eyes: Positive for photophobia.   Respiratory: Negative for cough and shortness of breath.    Cardiovascular: Negative for chest  pain.   Gastrointestinal: Positive for nausea. Negative for vomiting.   Neurological: Positive for headaches. Negative for dizziness.   Psychiatric/Behavioral: Negative for confusion.       Objective:      /80 (BP Location: Left arm, Patient Position: Sitting, BP Method: Large (Manual))   Pulse (!) 112   Ht 5' (1.524 m)   Wt 97.5 kg (214 lb 15.2 oz)   LMP 09/14/2018 (Exact Date)   SpO2 98%   BMI 41.98 kg/m²   Physical Exam   Constitutional: She is oriented to person, place, and time. She appears well-developed and well-nourished. No distress.   HENT:   Head: Normocephalic and atraumatic.   Right Ear: Tympanic membrane and ear canal normal. Tympanic membrane is not erythematous and not retracted. No middle ear effusion.   Left Ear: Tympanic membrane and ear canal normal. Tympanic membrane is not erythematous and not retracted.  No middle ear effusion.   Nose: Nose normal. No mucosal edema or rhinorrhea. Right sinus exhibits no maxillary sinus tenderness and no frontal sinus tenderness. Left sinus exhibits no maxillary sinus tenderness and no frontal sinus tenderness.   Mouth/Throat: Oropharynx is clear and moist and mucous membranes are normal. No posterior oropharyngeal edema or posterior oropharyngeal erythema.   Eyes: EOM are normal. Pupils are equal, round, and reactive to light.   Neck: Normal range of motion.   Cardiovascular: Normal rate and regular rhythm.   No murmur heard.  Pulmonary/Chest: Effort normal and breath sounds normal. No respiratory distress.   Abdominal: Soft. Bowel sounds are normal.   Musculoskeletal: Normal range of motion.   Lymphadenopathy:     She has no cervical adenopathy.   Neurological: She is alert and oriented to person, place, and time. No cranial nerve deficit or sensory deficit. She exhibits normal muscle tone.   Skin: Skin is warm and dry.   Psychiatric: She has a normal mood and affect.   Vitals reviewed.      Assessment:       1. Migraine without aura and without  status migrainosus, not intractable    2. Pseudotumor cerebri syndrome        Plan:     1. Migraine without aura and without status migrainosus, not intractable  2. Pseudotumor cerebri syndrome  - patient with worsening headache, features of which she has had in the past, but generally not her typical chronic migraine headache  - history and exam findings reviewed, no alarm signs/symptoms, no neurologic deficit  - additional evaluation options reviewed, will refer to a headache specialist in the [facility] group, Dr. Boyce  - - Ambulatory referral to Neurology  - - prior imaging reports reviewed, last head CT was in July 2017 but in the absence of alarm findings have recommended to defer any head imaging for now until re-evaluated by Neurology, and patient agrees  - additional treatment options reviewed, will await further recommendations by Neurology for possible preventive regimen, for now have offered alternate triptan medication as the patient notes she is too nauseous for oral medication at this point  - - ZOLMitriptan (ZOMIG) 5 mg Spry; 1 spray by Nasal route once as needed.  Dispense: 6 each; Refill: 1  - - have counseled that her history/exam today is not suggestive of an acute bacterial sinus infection, so antibiotics would not be predicted to help  - - we also discussed a trial of Zofran and she notes she has some leftover ODT at home that she can try  - questions answered, warning signs and ER precautions reviewed, patient is comfortable with this plan and was encouraged to call the office for any concerns or worsening of condition     - Follow-up in the next week if needed, for follow-up headache/migraine.     Donis Arce MD  11/6/2018

## 2018-11-14 RX ORDER — ACETAZOLAMIDE 250 MG/1
TABLET ORAL
Qty: 90 TABLET | Refills: 10 | Status: SHIPPED | OUTPATIENT
Start: 2018-11-14 | End: 2019-11-26 | Stop reason: SDUPTHER

## 2018-12-03 ENCOUNTER — PATIENT MESSAGE (OUTPATIENT)
Dept: INTERNAL MEDICINE | Facility: CLINIC | Age: 38
End: 2018-12-03

## 2018-12-04 ENCOUNTER — TELEPHONE (OUTPATIENT)
Dept: INTERNAL MEDICINE | Facility: CLINIC | Age: 38
End: 2018-12-04

## 2018-12-05 ENCOUNTER — TELEPHONE (OUTPATIENT)
Dept: INTERNAL MEDICINE | Facility: CLINIC | Age: 38
End: 2018-12-05

## 2018-12-05 DIAGNOSIS — G43.009 MIGRAINE WITHOUT AURA AND WITHOUT STATUS MIGRAINOSUS, NOT INTRACTABLE: Primary | ICD-10-CM

## 2018-12-05 RX ORDER — SUMATRIPTAN 5 MG/1
SPRAY NASAL
Qty: 6 EACH | Refills: 2 | Status: SHIPPED | OUTPATIENT
Start: 2018-12-05 | End: 2019-01-09

## 2018-12-05 NOTE — TELEPHONE ENCOUNTER
"Informed pt "New Rx for sumatriptan nasal spray was sent to [facility]."     Pt verbalized and understood  "

## 2018-12-28 ENCOUNTER — PATIENT MESSAGE (OUTPATIENT)
Dept: INTERNAL MEDICINE | Facility: CLINIC | Age: 38
End: 2018-12-28

## 2019-01-03 NOTE — TELEPHONE ENCOUNTER
Please see patients mychart message, please verify with Neurosurgery what scan they prefer for her and pend with association

## 2019-01-03 NOTE — TELEPHONE ENCOUNTER
Please advise which scan the patient should have for her pseudotumor. Please see previous messages.

## 2019-01-07 ENCOUNTER — PATIENT MESSAGE (OUTPATIENT)
Dept: INTERNAL MEDICINE | Facility: CLINIC | Age: 39
End: 2019-01-07

## 2019-01-08 ENCOUNTER — TELEPHONE (OUTPATIENT)
Dept: INTERNAL MEDICINE | Facility: CLINIC | Age: 39
End: 2019-01-08

## 2019-01-09 ENCOUNTER — OFFICE VISIT (OUTPATIENT)
Dept: INTERNAL MEDICINE | Facility: CLINIC | Age: 39
End: 2019-01-09
Payer: COMMERCIAL

## 2019-01-09 ENCOUNTER — PATIENT MESSAGE (OUTPATIENT)
Dept: INTERNAL MEDICINE | Facility: CLINIC | Age: 39
End: 2019-01-09

## 2019-01-09 VITALS
SYSTOLIC BLOOD PRESSURE: 110 MMHG | HEART RATE: 98 BPM | OXYGEN SATURATION: 98 % | DIASTOLIC BLOOD PRESSURE: 70 MMHG | BODY MASS INDEX: 41.99 KG/M2 | HEIGHT: 60 IN | WEIGHT: 213.88 LBS

## 2019-01-09 DIAGNOSIS — J20.8 ACUTE BRONCHITIS, VIRAL: ICD-10-CM

## 2019-01-09 DIAGNOSIS — M54.2 CERVICAL SPINE PAIN: Primary | ICD-10-CM

## 2019-01-09 DIAGNOSIS — M54.2 NECK PAIN: ICD-10-CM

## 2019-01-09 DIAGNOSIS — Z98.1 STATUS POST CERVICAL SPINAL FUSION: ICD-10-CM

## 2019-01-09 PROCEDURE — 99213 OFFICE O/P EST LOW 20 MIN: CPT | Mod: S$GLB,,, | Performed by: FAMILY MEDICINE

## 2019-01-09 PROCEDURE — 99213 PR OFFICE/OUTPT VISIT, EST, LEVL III, 20-29 MIN: ICD-10-PCS | Mod: S$GLB,,, | Performed by: FAMILY MEDICINE

## 2019-01-09 PROCEDURE — 99999 PR PBB SHADOW E&M-EST. PATIENT-LVL IV: ICD-10-PCS | Mod: PBBFAC,,, | Performed by: FAMILY MEDICINE

## 2019-01-09 PROCEDURE — 3008F BODY MASS INDEX DOCD: CPT | Mod: CPTII,S$GLB,, | Performed by: FAMILY MEDICINE

## 2019-01-09 PROCEDURE — 3008F PR BODY MASS INDEX (BMI) DOCUMENTED: ICD-10-PCS | Mod: CPTII,S$GLB,, | Performed by: FAMILY MEDICINE

## 2019-01-09 PROCEDURE — 99999 PR PBB SHADOW E&M-EST. PATIENT-LVL IV: CPT | Mod: PBBFAC,,, | Performed by: FAMILY MEDICINE

## 2019-01-09 RX ORDER — BENZONATATE 200 MG/1
200 CAPSULE ORAL 3 TIMES DAILY PRN
Qty: 30 CAPSULE | Refills: 1 | Status: SHIPPED | OUTPATIENT
Start: 2019-01-09 | End: 2019-01-19

## 2019-01-09 RX ORDER — PROMETHAZINE HYDROCHLORIDE AND DEXTROMETHORPHAN HYDROBROMIDE 6.25; 15 MG/5ML; MG/5ML
5 SYRUP ORAL EVERY 6 HOURS PRN
Qty: 118 ML | Refills: 0 | Status: SHIPPED | OUTPATIENT
Start: 2019-01-09 | End: 2019-01-22 | Stop reason: SDUPTHER

## 2019-01-09 NOTE — PATIENT INSTRUCTIONS
"For sick symptoms, try the following over-the-counter options:    1. Stay well-hydrated, try warm drinks or tea, and if available, use a humidifier where you sleep.  These will help to reduce congestion and stuffiness.    2. For cough, try 1/2-1 teaspoon of honey, either by itself or mixed in with a warm drink.  Do not give honey to infants.  Cough lozenges are also often helpful, but would not be recommended for small children.  Dextromethorphan (Delsym, "-DM") syrup is also a good option for adults.  The use of codeine-containing cough syrup is no longer recommended at any age.    3. For sinus congestion, there is some evidence for using guaifenesin (Robitussin, Mucinex) to help relieve symptoms.  I would also recommend to avoid using anti-histamines (Benadryl, "allergy" medications) to allow for better drainage of the sinuses.  Sinus rinses and sprays have also been shown to be helpful.    4. For sore throat, do not forget about salt water gargles.  This is an old remedy but often effective.  Tylenol is also a good option for throat pain, as long as there is no other reason for you to avoid this medication.  You can also try popsicles.    5. For headache, I would recommend ibuprofen.  This is a pain-reliever and anti-inflammatory, which should help to relieve pressure and swelling.  Do not take if you have been advised to avoid NSAID medications.  Also, be advised to not give Aspirin to children or teenagers.    6. Remember that the common cold is almost always viral, especially in the first week.  Antibiotics unfortunately will not help for this type of infection, and in fact may just make you feel worse.  Illness that lasts beyond one week should be re-evaluated in clinic.    Give yourself extra rest and the opportunity for your body to recover.  Please call the office if you have any additional questions or concerns.    "
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
## 2019-01-10 ENCOUNTER — PATIENT MESSAGE (OUTPATIENT)
Dept: HEMATOLOGY/ONCOLOGY | Facility: CLINIC | Age: 39
End: 2019-01-10

## 2019-01-17 ENCOUNTER — PATIENT MESSAGE (OUTPATIENT)
Dept: INTERNAL MEDICINE | Facility: CLINIC | Age: 39
End: 2019-01-17

## 2019-01-17 NOTE — TELEPHONE ENCOUNTER
If it was just persistent cough then that would be expected, but in this case I would recommend re-evaluation to see about additional medication or testing which might be indicated.  Please see if she can come in before the weekend, thanks.

## 2019-01-17 NOTE — TELEPHONE ENCOUNTER
"Informed pt Dr. Arce stated,"If it was just persistent cough then that would be expected, but in this case I would recommend re-evaluation to see about additional medication or testing which might be indicated.  Please see if she can come in before the weekend." Pt stated, she will look online and schedule,she's not sure if she can take off."  "

## 2019-01-22 ENCOUNTER — OFFICE VISIT (OUTPATIENT)
Dept: INTERNAL MEDICINE | Facility: CLINIC | Age: 39
End: 2019-01-22
Payer: COMMERCIAL

## 2019-01-22 VITALS
OXYGEN SATURATION: 98 % | WEIGHT: 213.75 LBS | HEART RATE: 111 BPM | BODY MASS INDEX: 41.97 KG/M2 | SYSTOLIC BLOOD PRESSURE: 120 MMHG | HEIGHT: 60 IN | DIASTOLIC BLOOD PRESSURE: 80 MMHG

## 2019-01-22 DIAGNOSIS — J01.00 ACUTE NON-RECURRENT MAXILLARY SINUSITIS: Primary | ICD-10-CM

## 2019-01-22 PROCEDURE — 3008F BODY MASS INDEX DOCD: CPT | Mod: CPTII,S$GLB,, | Performed by: FAMILY MEDICINE

## 2019-01-22 PROCEDURE — 3008F PR BODY MASS INDEX (BMI) DOCUMENTED: ICD-10-PCS | Mod: CPTII,S$GLB,, | Performed by: FAMILY MEDICINE

## 2019-01-22 PROCEDURE — 99999 PR PBB SHADOW E&M-EST. PATIENT-LVL III: CPT | Mod: PBBFAC,,, | Performed by: FAMILY MEDICINE

## 2019-01-22 PROCEDURE — 99213 PR OFFICE/OUTPT VISIT, EST, LEVL III, 20-29 MIN: ICD-10-PCS | Mod: S$GLB,,, | Performed by: FAMILY MEDICINE

## 2019-01-22 PROCEDURE — 99999 PR PBB SHADOW E&M-EST. PATIENT-LVL III: ICD-10-PCS | Mod: PBBFAC,,, | Performed by: FAMILY MEDICINE

## 2019-01-22 PROCEDURE — 99213 OFFICE O/P EST LOW 20 MIN: CPT | Mod: S$GLB,,, | Performed by: FAMILY MEDICINE

## 2019-01-22 RX ORDER — AMOXICILLIN AND CLAVULANATE POTASSIUM 875; 125 MG/1; MG/1
1 TABLET, FILM COATED ORAL EVERY 12 HOURS
Qty: 20 TABLET | Refills: 0 | Status: SHIPPED | OUTPATIENT
Start: 2019-01-22 | End: 2019-02-01

## 2019-01-22 RX ORDER — PROMETHAZINE HYDROCHLORIDE AND DEXTROMETHORPHAN HYDROBROMIDE 6.25; 15 MG/5ML; MG/5ML
5 SYRUP ORAL EVERY 6 HOURS PRN
Qty: 118 ML | Refills: 0 | Status: SHIPPED | OUTPATIENT
Start: 2019-01-22 | End: 2019-02-01

## 2019-01-22 NOTE — PROGRESS NOTES
[facility]  Clinic Note      Subjective:       Patient ID: Sarah Ulloa is a 38 y.o. female.    Chief Complaint: Cough    Patient is here today for a follow-up visit.  She was seen earlier this month and felt to have an acute viral URI.  She has been sick for about two weeks now, and notes she is not getting better.  She is not running any fever or chills.  She has had worsening nasal/sinus congestion and some maxillary sinus pressure.  She notes her cough persists, and is now more productive.  She has had some congested breathing, but no dyspnea.  She denies any wheezing, post-tussive emesis, or cough paroxysm.  She has had some increased headache.  She was last seen for neck pain, and fortunately the cough has not made this worse.  No chest pain.  She has tried Tessalon and cough syrups without much relief.  She notes the Tessalon seemed to cause some oropharyngeal itching.  No ill contacts.      Review of Systems   Constitutional: Positive for fatigue. Negative for chills and fever.   HENT: Positive for congestion, postnasal drip, rhinorrhea and sinus pressure. Negative for sinus pain and sore throat.    Respiratory: Positive for cough. Negative for shortness of breath and wheezing.    Cardiovascular: Negative for chest pain and palpitations.   Gastrointestinal: Negative for diarrhea, nausea and vomiting.   Skin: Negative for rash and wound.   Neurological: Positive for headaches. Negative for dizziness.       Objective:      /80 (BP Location: Left arm, Patient Position: Sitting, BP Method: Large (Manual))   Pulse (!) 111   Ht 5' (1.524 m)   Wt 97 kg (213 lb 11.8 oz)   LMP 12/28/2018   SpO2 98%   BMI 41.74 kg/m²   Physical Exam   Constitutional: She is oriented to person, place, and time. She appears well-developed and well-nourished. No distress.   HENT:   Head: Normocephalic and atraumatic.   Right Ear: Tympanic membrane and ear canal normal. Tympanic membrane is not erythematous  and not retracted. No middle ear effusion.   Left Ear: Tympanic membrane and ear canal normal. Tympanic membrane is not erythematous and not retracted.  No middle ear effusion.   Nose: Mucosal edema present. No rhinorrhea. Right sinus exhibits maxillary sinus tenderness. Left sinus exhibits maxillary sinus tenderness.   Mouth/Throat: Oropharynx is clear and moist and mucous membranes are normal. No posterior oropharyngeal edema or posterior oropharyngeal erythema.   Neck: Normal range of motion. No thyromegaly present.   Cardiovascular: Normal rate and regular rhythm.   No murmur heard.  Pulmonary/Chest: Effort normal and breath sounds normal. No tachypnea. No respiratory distress. She has no wheezes. She has no rhonchi. She has no rales. Chest wall is not dull to percussion.   Abdominal: Soft. Bowel sounds are normal. She exhibits no distension. There is no tenderness.   Musculoskeletal: Normal range of motion. She exhibits no edema.   Lymphadenopathy:     She has no cervical adenopathy.   Neurological: She is alert and oriented to person, place, and time. No cranial nerve deficit or sensory deficit. She exhibits normal muscle tone.   Skin: Skin is warm and dry. No rash noted.   Psychiatric: She has a normal mood and affect.   Vitals reviewed.      Assessment:       1. Acute non-recurrent maxillary sinusitis        Plan:     1. Acute non-recurrent maxillary sinusitis  - exam findings reviewed, generally reassuring and without alarm signs/symptoms  - differential reviewed, presentation of prolonged illness is most consistent with acute sinusitis  - - evaluation options reviewed, will defer CXR for now and advised for persistent symptoms after starting medication, would consider checking a respiratory panel to rule out Pertussis or atypical pneumonia  - basic pathophysiology reviewed  - supportive care measures reviewed, have recommended increased oral fluids and judicious use of OTC cough-cold remedies, handout  provided   - treatment options reviewed, will start empiric course of antibiotic and refill of cough suppressant, dosing instructions and potential side effects reviewed   - - amoxicillin-clavulanate 875-125mg (AUGMENTIN) 875-125 mg per tablet; Take 1 tablet by mouth every 12 (twelve) hours. for 10 days  Dispense: 20 tablet; Refill: 0  - - promethazine-dextromethorphan (PROMETHAZINE-DM) 6.25-15 mg/5 mL Syrp; Take 5 mLs by mouth every 6 (six) hours as needed (cough).  Dispense: 118 mL; Refill: 0   - questions answered, warning signs reviewed, patient is comfortable with this plan and was encouraged to call the office for any concerns or worsening of condition    - Follow-up give us a call on Friday for a status update, for follow-up illness.     Donis Arce MD  1/22/2019

## 2019-01-25 ENCOUNTER — PATIENT MESSAGE (OUTPATIENT)
Dept: INTERNAL MEDICINE | Facility: CLINIC | Age: 39
End: 2019-01-25

## 2019-01-31 NOTE — PROGRESS NOTES
PATIENT: Sarah Ulloa  MRN: [id number]  DATE: 1/31/2019    Diagnosis:   1. History of hypercoagulable state    2. Factor 5 Leiden mutation, heterozygous    3. Morbid obesity    4. Advance care planning      Chief Complaint: hypercoagulable state    Subjective:    History of Present Illness: Ms. Ulloa is a 38 y.o. female who presents for evaluation and management of a history of hypercoagulable state / factor 5 leiden heterozygous mutation. She has the following comorbid conditions: morbid obesity, bipolar disorder, pseudotumor cerebri.    Her mother was diagnosed with factor V leiden mutation - heterozygous. She had a deep vein thrombosis in her leg and pulmonary emboli about one week after a hysterectomy. A hypercoagulable workup revealed a heterozygous mutation of factor V leiden. Several family members were also tested, including Ms. Ulloa. She came back positive for the mutation, as well as her maternal grandfather and two maternal aunts.  - she has one child. During her pregnancy, she received enoxaparin during and after the delivery.  - today, she is doing well. She denies shortness of breath, chest pain, nausea, vomiting, diarrhea, constipation.    Past medical, surgical, family, and social histories have been reviewed and updated below.    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     Morbid obesity with BMI of 45.0-49.9, adult     Thyroid nodule 5/29/2017       Past Surgical History:   Past Surgical History:   Procedure Laterality Date    ANTERIOR CERVICAL DISCECTOMY W/ FUSION  2016    ESOPHAGEAL BRAVO PH N/A 2/13/2017    [facility] at [facility] (4[facility] F[facility])    ESOPHAGOGASTRODUODENOSCOPY      ESOPHAGOGASTRODUODENOSCOPY (EGD) N/A 2/13/2017    Performed by Amauri DUARTE  MD [facility] (4TH FLR)    [id number] N/A 3/3/2017    Performed by Jonathon Peck MD at [facility] OR 2ND FLR    Laparoscopic hh with Toupet      laproscopic REPAIR HERNIA LAPAROSCOPIC HIATAL N/A 3/3/2017    Performed by [facility]ck [facility] at [facility] OR 2ND FLR    TONSILLECTOMY  2007       Family History:   Family History   Problem Relation Age of Onset    Hypertension Father     Diabetes Paternal Grandfather     Cancer Neg Hx     Heart disease Neg Hx     Stroke Neg Hx        Social History:  reports that she quit smoking about 6 years ago. Her smoking use included cigarettes. She has a 2.50 pack-year smoking history. she has never used smokeless tobacco. She reports that she does not drink alcohol or use drugs.    Allergies:  Review of patient's allergies indicates:   Allergen Reactions    Percocet [oxycodone-acetaminophen] Nausea And Vomiting    Sulfa (sulfonamide antibiotics) Rash    Phentermine Rash       Medications:  Current Outpatient Medications   Medication Sig Dispense Refill    acetaZOLAMIDE (DIAMOX) 250 MG tablet TAKE ONE TABLET BY MOUTH THREE TIMES A DAY 90 tablet 10    amoxicillin-clavulanate 875-125mg (AUGMENTIN) 875-125 mg per tablet Take 1 tablet by mouth every 12 (twelve) hours. for 10 days 20 tablet 0    buPROPion (WELLBUTRIN SR) 200 MG TbSR 200 mg 2 (two) times daily.      ergocalciferol (VITAMIN D2) 50,000 unit Cap Take 50,000 Units by mouth every 7 days.      lamotrigine (LAMICTAL) 150 MG Tab 150 mg 2 (two) times daily.      norethindrone (MICRONOR) 0.35 mg tablet       promethazine-dextromethorphan (PROMETHAZINE-DM) 6.25-15 mg/5 mL Syrp Take 5 mLs by mouth every 6 (six) hours as needed (cough). 118 mL 0    sumatriptan (IMITREX) 100 MG tablet Take 1 tablet (100 mg total) by mouth every 2 (two) hours as needed for Migraine (Max 2 tabs per 24 hours and 12 tablets per week). 9 tablet 6    TRINTELLIX 10 mg Tab 10 mg once daily.       No  "current facility-administered medications for this visit.        Review of Systems   Constitutional: Positive for fatigue.   HENT: Negative for sore throat.    Eyes: Negative for visual disturbance.   Respiratory: Negative for cough and shortness of breath.    Cardiovascular: Negative for chest pain and leg swelling.   Gastrointestinal: Negative for abdominal pain.   Genitourinary: Negative for dysuria.   Musculoskeletal: Negative for back pain.   Skin: Negative for rash.   Neurological: Positive for headaches.   Hematological: Negative for adenopathy.   Psychiatric/Behavioral: Negative for dysphoric mood.       ECOG Performance Status:   ECOG SCORE 0       Objective:      Vitals:   Vitals:    02/01/19 1557   BP: 119/72   Pulse: 100   Resp: 18   Temp: 98.2 °F (36.8 °C)   TempSrc: Oral   Weight: 98.6 kg (217 lb 6 oz)   Height: 5' 1.25" (1.556 m)     BMI: Body mass index is 40.74 kg/m².    Physical Exam   Constitutional: She is oriented to person, place, and time. She appears well-developed and well-nourished.   HENT:   Head: Normocephalic and atraumatic.   Eyes: EOM are normal. Pupils are equal, round, and reactive to light.   Neck: Normal range of motion. Neck supple.   Cardiovascular: Normal rate and regular rhythm.   Pulmonary/Chest: Effort normal and breath sounds normal.   Abdominal: Soft. Bowel sounds are normal.   Musculoskeletal: Normal range of motion. She exhibits no edema.   Neurological: She is alert and oriented to person, place, and time.   Skin: Skin is warm and dry.   Psychiatric: She has a normal mood and affect. Her behavior is normal. Judgment and thought content normal.   Nursing note and vitals reviewed.    Laboratory Data:  Labs have been reviewed.      Assessment:       1. History of hypercoagulable state    2. Factor 5 Leiden mutation, heterozygous    3. Morbid obesity    4. Advance care planning         Plan:     1. Hypercoagulable state / heterozygous factor V leiden mutation  - she has been " tested previously (not in our records) and found to be positive for heterozygous mutation in factor V leiden.  - in our records, she was checked for protein C and S deficiency and found to be negative for them.  - I will check for other hypercoagulable states, including antiphospholipid syndrome, prothrombin mutation, and antithrombin 3.  - she is currently taking a progesterone-only birth control pill.  - I told her that heterozygous factor 5 leiden mutation is relatively common (4-5% of the population). It places her at about 4-5x risk of first event. If she were to have a blood clot, the risk of recurrent thrombosis is not as well-defined.  - I recommended weight loss, as adipose tissue is a risk factor for thrombosis.  - she does not smoke.  - I will message her with the results of these labs. If needed, I will schedule a follow-up appointment.    2. Morbid obesity  - Body mass index is 40.74 kg/m².  - I recommended weight loss, as adipose tissue is a risk factor for thrombosis.    3. Advance care planning  - I initiated the process of advance care planning today and explained the importance of this process to the patient.  I introduced the concept of advance directives to the patient, as well. Then the patient received detailed information about the importance of designating a Health Care Power of  (HCPOA). She was also instructed to communicate with this person about their wishes for future healthcare, should she become sick and lose decision-making capacity. The patient has not previously appointed a HCPOA. After our discussion, the patient has decided to complete a HCPOA and has appointed her father Silverio Ulloa ([phone]). The forms were completed and will be scanned into EP[employer]. I spent a total time of 16 minutes discussing this issue with the patient.    - I will message her with the results of these labs. If needed, I will schedule a follow-up appointment.    Arik Patel,  M.D.  Hematology/Oncology  [facility] - [address], [address]  [place] [address]  Phone: [phone]  Fax: [phone]

## 2019-02-01 ENCOUNTER — LAB VISIT (OUTPATIENT)
Dept: LAB | Facility: HOSPITAL | Age: 39
End: 2019-02-01
Attending: INTERNAL MEDICINE
Payer: COMMERCIAL

## 2019-02-01 ENCOUNTER — INITIAL CONSULT (OUTPATIENT)
Dept: HEMATOLOGY/ONCOLOGY | Facility: CLINIC | Age: 39
End: 2019-02-01
Payer: COMMERCIAL

## 2019-02-01 ENCOUNTER — TELEPHONE (OUTPATIENT)
Dept: HEMATOLOGY/ONCOLOGY | Facility: CLINIC | Age: 39
End: 2019-02-01

## 2019-02-01 VITALS
DIASTOLIC BLOOD PRESSURE: 72 MMHG | SYSTOLIC BLOOD PRESSURE: 119 MMHG | RESPIRATION RATE: 18 BRPM | BODY MASS INDEX: 41.04 KG/M2 | TEMPERATURE: 98 F | HEIGHT: 61 IN | HEART RATE: 100 BPM | WEIGHT: 217.38 LBS

## 2019-02-01 DIAGNOSIS — Z86.2 HISTORY OF HYPERCOAGULABLE STATE: Primary | ICD-10-CM

## 2019-02-01 DIAGNOSIS — D68.51 FACTOR 5 LEIDEN MUTATION, HETEROZYGOUS: ICD-10-CM

## 2019-02-01 DIAGNOSIS — Z71.89 ADVANCE CARE PLANNING: ICD-10-CM

## 2019-02-01 DIAGNOSIS — E66.01 MORBID OBESITY: ICD-10-CM

## 2019-02-01 DIAGNOSIS — Z86.2 HISTORY OF HYPERCOAGULABLE STATE: ICD-10-CM

## 2019-02-01 PROCEDURE — 81240 F2 GENE: CPT

## 2019-02-01 PROCEDURE — 85300 ANTITHROMBIN III ACTIVITY: CPT

## 2019-02-01 PROCEDURE — 99204 PR OFFICE/OUTPT VISIT, NEW, LEVL IV, 45-59 MIN: ICD-10-PCS | Mod: S$GLB,,, | Performed by: INTERNAL MEDICINE

## 2019-02-01 PROCEDURE — 3008F PR BODY MASS INDEX (BMI) DOCUMENTED: ICD-10-PCS | Mod: CPTII,S$GLB,, | Performed by: INTERNAL MEDICINE

## 2019-02-01 PROCEDURE — 86146 BETA-2 GLYCOPROTEIN ANTIBODY: CPT

## 2019-02-01 PROCEDURE — 99204 OFFICE O/P NEW MOD 45 MIN: CPT | Mod: S$GLB,,, | Performed by: INTERNAL MEDICINE

## 2019-02-01 PROCEDURE — 36415 COLL VENOUS BLD VENIPUNCTURE: CPT

## 2019-02-01 PROCEDURE — 99497 ADVNCD CARE PLAN 30 MIN: CPT | Mod: S$GLB,,, | Performed by: INTERNAL MEDICINE

## 2019-02-01 PROCEDURE — 99999 PR PBB SHADOW E&M-EST. PATIENT-LVL III: CPT | Mod: PBBFAC,,, | Performed by: INTERNAL MEDICINE

## 2019-02-01 PROCEDURE — 86147 CARDIOLIPIN ANTIBODY EA IG: CPT

## 2019-02-01 PROCEDURE — 3008F BODY MASS INDEX DOCD: CPT | Mod: CPTII,S$GLB,, | Performed by: INTERNAL MEDICINE

## 2019-02-01 PROCEDURE — 99497 PR ADVNCD CARE PLAN 30 MIN: ICD-10-PCS | Mod: S$GLB,,, | Performed by: INTERNAL MEDICINE

## 2019-02-01 PROCEDURE — 99999 PR PBB SHADOW E&M-EST. PATIENT-LVL III: ICD-10-PCS | Mod: PBBFAC,,, | Performed by: INTERNAL MEDICINE

## 2019-02-01 PROCEDURE — 85613 RUSSELL VIPER VENOM DILUTED: CPT

## 2019-02-01 NOTE — LETTER
February 1, 2019      Nini Pryor MD  [address] [place] [address]  [address]  [place] [address]           [place] [facility]  [address] [facility]  [place] [address]  Phone: [phone]          Patient: Sarah Ulloa   MR Number: [id number]   YOB: 1980   Date of Visit: 2/1/2019       Dear Dr. Nini Pryor:    Thank you for referring Sarah Ulloa to me for evaluation. Attached you will find relevant portions of my assessment and plan of care.    If you have questions, please do not hesitate to call me. I look forward to following Sarah Ulloa along with you.    Sincerely,    Arik Patel MD    Enclosure  CC:  No Recipients    If you would like to receive this communication electronically, please contact [email or web address] or [phone] to request more information on [employer] access.    For providers and/or their staff who would like to refer a patient to [facility], please contact us through our one-stop-shop provider referral line, Yoandy Freitas, at [phone].    If you feel you have received this communication in error or would no longer like to receive these types of communications, please e-mail [email or web address]

## 2019-02-03 ENCOUNTER — HOSPITAL ENCOUNTER (OUTPATIENT)
Facility: HOSPITAL | Age: 39
Discharge: HOME | End: 2019-02-04
Attending: EMERGENCY MEDICINE | Admitting: SURGERY
Payer: COMMERCIAL

## 2019-02-03 ENCOUNTER — PATIENT MESSAGE (OUTPATIENT)
Dept: INTERNAL MEDICINE | Facility: CLINIC | Age: 39
End: 2019-02-03

## 2019-02-03 DIAGNOSIS — K35.30 ACUTE APPENDICITIS WITH LOCALIZED PERITONITIS, WITHOUT PERFORATION, ABSCESS, OR GANGRENE: Primary | ICD-10-CM

## 2019-02-03 DIAGNOSIS — R10.11 RUQ ABDOMINAL PAIN: ICD-10-CM

## 2019-02-03 DIAGNOSIS — K35.80 ACUTE APPENDICITIS: ICD-10-CM

## 2019-02-03 PROCEDURE — 96376 TX/PRO/DX INJ SAME DRUG ADON: CPT

## 2019-02-03 PROCEDURE — 96375 TX/PRO/DX INJ NEW DRUG ADDON: CPT

## 2019-02-03 PROCEDURE — 96361 HYDRATE IV INFUSION ADD-ON: CPT

## 2019-02-03 PROCEDURE — 81025 URINE PREGNANCY TEST: CPT | Performed by: EMERGENCY MEDICINE

## 2019-02-03 PROCEDURE — 99285 EMERGENCY DEPT VISIT HI MDM: CPT | Mod: 25

## 2019-02-03 PROCEDURE — 96367 TX/PROPH/DG ADDL SEQ IV INF: CPT

## 2019-02-03 PROCEDURE — 96365 THER/PROPH/DIAG IV INF INIT: CPT

## 2019-02-04 ENCOUNTER — ANESTHESIA (OUTPATIENT)
Dept: SURGERY | Facility: HOSPITAL | Age: 39
End: 2019-02-04
Payer: COMMERCIAL

## 2019-02-04 ENCOUNTER — ANESTHESIA EVENT (OUTPATIENT)
Dept: SURGERY | Facility: HOSPITAL | Age: 39
End: 2019-02-04
Payer: COMMERCIAL

## 2019-02-04 ENCOUNTER — PATIENT MESSAGE (OUTPATIENT)
Dept: INTERNAL MEDICINE | Facility: CLINIC | Age: 39
End: 2019-02-04

## 2019-02-04 PROBLEM — R10.11 RUQ ABDOMINAL PAIN: Status: ACTIVE | Noted: 2019-02-04

## 2019-02-04 PROBLEM — K35.80 ACUTE APPENDICITIS: Status: ACTIVE | Noted: 2019-02-04

## 2019-02-04 LAB
ALBUMIN SERPL BCP-MCNC: 3.8 G/DL
ALP SERPL-CCNC: 81 U/L
ALT SERPL W/O P-5'-P-CCNC: 15 U/L
ANION GAP SERPL CALC-SCNC: 9 MMOL/L
AST SERPL-CCNC: 13 U/L
AT III ACT/NOR PPP CHRO: 91 %
B-HCG UR QL: NEGATIVE
BACTERIA #/AREA URNS HPF: ABNORMAL /HPF
BASOPHILS # BLD AUTO: 0.02 K/UL
BASOPHILS NFR BLD: 0.1 %
BILIRUB SERPL-MCNC: 0.2 MG/DL
BILIRUB UR QL STRIP: NEGATIVE
BUN SERPL-MCNC: 8 MG/DL
CALCIUM SERPL-MCNC: 8.9 MG/DL
CAOX CRY URNS QL MICRO: ABNORMAL
CHLORIDE SERPL-SCNC: 111 MMOL/L
CLARITY UR: CLEAR
CO2 SERPL-SCNC: 18 MMOL/L
COLOR UR: YELLOW
CREAT SERPL-MCNC: 0.9 MG/DL
CTP QC/QA: YES
DIFFERENTIAL METHOD: ABNORMAL
EOSINOPHIL # BLD AUTO: 0.2 K/UL
EOSINOPHIL NFR BLD: 1.1 %
ERYTHROCYTE [DISTWIDTH] IN BLOOD BY AUTOMATED COUNT: 13.5 %
EST. GFR  (AFRICAN AMERICAN): >60 ML/MIN/1.73 M^2
EST. GFR  (NON AFRICAN AMERICAN): >60 ML/MIN/1.73 M^2
F2 GENE MUT ANL BLD/T: NORMAL
GLUCOSE SERPL-MCNC: 112 MG/DL
GLUCOSE UR QL STRIP: NEGATIVE
HCT VFR BLD AUTO: 40 %
HGB BLD-MCNC: 12.8 G/DL
HGB UR QL STRIP: ABNORMAL
KETONES UR QL STRIP: NEGATIVE
LEUKOCYTE ESTERASE UR QL STRIP: NEGATIVE
LIPASE SERPL-CCNC: 24 U/L
LYMPHOCYTES # BLD AUTO: 2.5 K/UL
LYMPHOCYTES NFR BLD: 16.6 %
MCH RBC QN AUTO: 28.8 PG
MCHC RBC AUTO-ENTMCNC: 32 G/DL
MCV RBC AUTO: 90 FL
MICROSCOPIC COMMENT: ABNORMAL
MONOCYTES # BLD AUTO: 1 K/UL
MONOCYTES NFR BLD: 6.8 %
NEUTROPHILS # BLD AUTO: 11.3 K/UL
NEUTROPHILS NFR BLD: 75.1 %
NITRITE UR QL STRIP: NEGATIVE
PH UR STRIP: 6 [PH] (ref 5–8)
PLATELET # BLD AUTO: 320 K/UL
PMV BLD AUTO: 9.6 FL
POTASSIUM SERPL-SCNC: 3.8 MMOL/L
PROT SERPL-MCNC: 7.1 G/DL
PROT UR QL STRIP: NEGATIVE
RBC # BLD AUTO: 4.45 M/UL
RBC #/AREA URNS HPF: 8 /HPF (ref 0–4)
SODIUM SERPL-SCNC: 138 MMOL/L
SP GR UR STRIP: 1.02 (ref 1–1.03)
URN SPEC COLLECT METH UR: ABNORMAL
UROBILINOGEN UR STRIP-ACNC: NEGATIVE EU/DL
WBC # BLD AUTO: 15.11 K/UL
WBC #/AREA URNS HPF: 3 /HPF (ref 0–5)

## 2019-02-04 PROCEDURE — 25000003 PHARM REV CODE 250: Performed by: EMERGENCY MEDICINE

## 2019-02-04 PROCEDURE — 88341 IMHCHEM/IMCYTCHM EA ADD ANTB: CPT | Performed by: PATHOLOGY

## 2019-02-04 PROCEDURE — 25000003 PHARM REV CODE 250: Performed by: STUDENT IN AN ORGANIZED HEALTH CARE EDUCATION/TRAINING PROGRAM

## 2019-02-04 PROCEDURE — 63600175 PHARM REV CODE 636 W HCPCS: Performed by: SURGERY

## 2019-02-04 PROCEDURE — 81000 URINALYSIS NONAUTO W/SCOPE: CPT

## 2019-02-04 PROCEDURE — 37000008 HC ANESTHESIA 1ST 15 MINUTES: Performed by: SURGERY

## 2019-02-04 PROCEDURE — 99220 PR INITIAL OBSERVATION CARE,LEVL III: CPT | Mod: ,,, | Performed by: SURGERY

## 2019-02-04 PROCEDURE — G0378 HOSPITAL OBSERVATION PER HR: HCPCS

## 2019-02-04 PROCEDURE — 85025 COMPLETE CBC W/AUTO DIFF WBC: CPT

## 2019-02-04 PROCEDURE — 99220 PR INITIAL OBSERVATION CARE,LEVL III: ICD-10-PCS | Mod: ,,, | Performed by: SURGERY

## 2019-02-04 PROCEDURE — 63600175 PHARM REV CODE 636 W HCPCS: Performed by: ANESTHESIOLOGY

## 2019-02-04 PROCEDURE — 44970 PR LAP,APPENDECTOMY: ICD-10-PCS | Mod: ,,, | Performed by: SURGERY

## 2019-02-04 PROCEDURE — 36000709 HC OR TIME LEV III EA ADD 15 MIN: Performed by: SURGERY

## 2019-02-04 PROCEDURE — 71000033 HC RECOVERY, INTIAL HOUR: Performed by: SURGERY

## 2019-02-04 PROCEDURE — 63600175 PHARM REV CODE 636 W HCPCS: Performed by: EMERGENCY MEDICINE

## 2019-02-04 PROCEDURE — 25000003 PHARM REV CODE 250: Performed by: SURGERY

## 2019-02-04 PROCEDURE — 71000015 HC POSTOP RECOV 1ST HR: Performed by: SURGERY

## 2019-02-04 PROCEDURE — 25500020 PHARM REV CODE 255: Performed by: EMERGENCY MEDICINE

## 2019-02-04 PROCEDURE — 71000016 HC POSTOP RECOV ADDL HR: Performed by: SURGERY

## 2019-02-04 PROCEDURE — 63600175 PHARM REV CODE 636 W HCPCS: Performed by: STUDENT IN AN ORGANIZED HEALTH CARE EDUCATION/TRAINING PROGRAM

## 2019-02-04 PROCEDURE — S0030 INJECTION, METRONIDAZOLE: HCPCS | Performed by: EMERGENCY MEDICINE

## 2019-02-04 PROCEDURE — 88342 TISSUE SPECIMEN TO PATHOLOGY - SURGERY: ICD-10-PCS | Mod: 26,,, | Performed by: PATHOLOGY

## 2019-02-04 PROCEDURE — 25000003 PHARM REV CODE 250: Performed by: ANESTHESIOLOGY

## 2019-02-04 PROCEDURE — 44970 LAPAROSCOPY APPENDECTOMY: CPT | Mod: ,,, | Performed by: SURGERY

## 2019-02-04 PROCEDURE — 36000708 HC OR TIME LEV III 1ST 15 MIN: Performed by: SURGERY

## 2019-02-04 PROCEDURE — 88304 TISSUE SPECIMEN TO PATHOLOGY - SURGERY: ICD-10-PCS | Mod: 26,,, | Performed by: PATHOLOGY

## 2019-02-04 PROCEDURE — 27201423 OPTIME MED/SURG SUP & DEVICES STERILE SUPPLY: Performed by: SURGERY

## 2019-02-04 PROCEDURE — 83690 ASSAY OF LIPASE: CPT

## 2019-02-04 PROCEDURE — 71000039 HC RECOVERY, EACH ADD'L HOUR: Performed by: SURGERY

## 2019-02-04 PROCEDURE — 37000009 HC ANESTHESIA EA ADD 15 MINS: Performed by: SURGERY

## 2019-02-04 PROCEDURE — 88342 IMHCHEM/IMCYTCHM 1ST ANTB: CPT | Mod: 26,,, | Performed by: PATHOLOGY

## 2019-02-04 PROCEDURE — 80053 COMPREHEN METABOLIC PANEL: CPT

## 2019-02-04 PROCEDURE — 88304 TISSUE EXAM BY PATHOLOGIST: CPT | Mod: 26,,, | Performed by: PATHOLOGY

## 2019-02-04 RX ORDER — ACETAMINOPHEN 10 MG/ML
INJECTION, SOLUTION INTRAVENOUS
Status: DISCONTINUED | OUTPATIENT
Start: 2019-02-04 | End: 2019-02-05

## 2019-02-04 RX ORDER — PROPOFOL 10 MG/ML
VIAL (ML) INTRAVENOUS CONTINUOUS PRN
Status: DISCONTINUED | OUTPATIENT
Start: 2019-02-04 | End: 2019-02-05

## 2019-02-04 RX ORDER — MORPHINE SULFATE 2 MG/ML
6 INJECTION, SOLUTION INTRAMUSCULAR; INTRAVENOUS
Status: COMPLETED | OUTPATIENT
Start: 2019-02-04 | End: 2019-02-04

## 2019-02-04 RX ORDER — HEPARIN SODIUM 5000 [USP'U]/ML
5000 INJECTION, SOLUTION INTRAVENOUS; SUBCUTANEOUS
Status: COMPLETED | OUTPATIENT
Start: 2019-02-04 | End: 2019-02-04

## 2019-02-04 RX ORDER — MIDAZOLAM HYDROCHLORIDE 1 MG/ML
INJECTION, SOLUTION INTRAMUSCULAR; INTRAVENOUS
Status: DISCONTINUED | OUTPATIENT
Start: 2019-02-04 | End: 2019-02-05

## 2019-02-04 RX ORDER — SODIUM CHLORIDE 0.9 % (FLUSH) 0.9 %
3 SYRINGE (ML) INJECTION
Status: DISCONTINUED | OUTPATIENT
Start: 2019-02-04 | End: 2019-02-04 | Stop reason: HOSPADM

## 2019-02-04 RX ORDER — CIPROFLOXACIN 2 MG/ML
400 INJECTION, SOLUTION INTRAVENOUS
Status: DISCONTINUED | OUTPATIENT
Start: 2019-02-04 | End: 2019-02-04 | Stop reason: HOSPADM

## 2019-02-04 RX ORDER — OXYCODONE HYDROCHLORIDE 5 MG/1
5 TABLET ORAL EVERY 6 HOURS PRN
Status: DISCONTINUED | OUTPATIENT
Start: 2019-02-04 | End: 2019-02-04 | Stop reason: HOSPADM

## 2019-02-04 RX ORDER — OXYCODONE HYDROCHLORIDE 5 MG/1
10 TABLET ORAL EVERY 6 HOURS PRN
Status: DISCONTINUED | OUTPATIENT
Start: 2019-02-04 | End: 2019-02-04 | Stop reason: HOSPADM

## 2019-02-04 RX ORDER — PROPOFOL 10 MG/ML
VIAL (ML) INTRAVENOUS
Status: DISCONTINUED | OUTPATIENT
Start: 2019-02-04 | End: 2019-02-04

## 2019-02-04 RX ORDER — ACETAMINOPHEN 500 MG
1000 TABLET ORAL EVERY 8 HOURS
Refills: 0
Start: 2019-02-04 | End: 2019-02-07

## 2019-02-04 RX ORDER — EPHEDRINE SULFATE 50 MG/ML
INJECTION, SOLUTION INTRAVENOUS
Status: DISCONTINUED | OUTPATIENT
Start: 2019-02-04 | End: 2019-02-05

## 2019-02-04 RX ORDER — MORPHINE SULFATE 4 MG/ML
4 INJECTION, SOLUTION INTRAMUSCULAR; INTRAVENOUS
Status: COMPLETED | OUTPATIENT
Start: 2019-02-04 | End: 2019-02-04

## 2019-02-04 RX ORDER — SODIUM CHLORIDE, SODIUM LACTATE, POTASSIUM CHLORIDE, CALCIUM CHLORIDE 600; 310; 30; 20 MG/100ML; MG/100ML; MG/100ML; MG/100ML
INJECTION, SOLUTION INTRAVENOUS CONTINUOUS
Status: DISCONTINUED | OUTPATIENT
Start: 2019-02-04 | End: 2019-02-04 | Stop reason: HOSPADM

## 2019-02-04 RX ORDER — METOPROLOL TARTRATE 1 MG/ML
INJECTION, SOLUTION INTRAVENOUS
Status: DISCONTINUED | OUTPATIENT
Start: 2019-02-04 | End: 2019-02-05

## 2019-02-04 RX ORDER — MORPHINE SULFATE 2 MG/ML
2 INJECTION, SOLUTION INTRAMUSCULAR; INTRAVENOUS EVERY 4 HOURS PRN
Status: DISCONTINUED | OUTPATIENT
Start: 2019-02-04 | End: 2019-02-04 | Stop reason: HOSPADM

## 2019-02-04 RX ORDER — BUPROPION HYDROCHLORIDE 100 MG/1
200 TABLET, EXTENDED RELEASE ORAL 2 TIMES DAILY
Status: DISCONTINUED | OUTPATIENT
Start: 2019-02-04 | End: 2019-02-04 | Stop reason: HOSPADM

## 2019-02-04 RX ORDER — PROPOFOL 10 MG/ML
VIAL (ML) INTRAVENOUS
Status: DISCONTINUED | OUTPATIENT
Start: 2019-02-04 | End: 2019-02-05

## 2019-02-04 RX ORDER — HYDROMORPHONE HYDROCHLORIDE 1 MG/ML
1 INJECTION, SOLUTION INTRAMUSCULAR; INTRAVENOUS; SUBCUTANEOUS EVERY 4 HOURS PRN
Status: DISCONTINUED | OUTPATIENT
Start: 2019-02-04 | End: 2019-02-04

## 2019-02-04 RX ORDER — DEXAMETHASONE SODIUM PHOSPHATE 4 MG/ML
INJECTION, SOLUTION INTRA-ARTICULAR; INTRALESIONAL; INTRAMUSCULAR; INTRAVENOUS; SOFT TISSUE
Status: DISCONTINUED | OUTPATIENT
Start: 2019-02-04 | End: 2019-02-05

## 2019-02-04 RX ORDER — PROPOFOL 10 MG/ML
VIAL (ML) INTRAVENOUS CONTINUOUS PRN
Status: DISCONTINUED | OUTPATIENT
Start: 2019-02-04 | End: 2019-02-04

## 2019-02-04 RX ORDER — ONDANSETRON 2 MG/ML
4 INJECTION INTRAMUSCULAR; INTRAVENOUS
Status: COMPLETED | OUTPATIENT
Start: 2019-02-04 | End: 2019-02-04

## 2019-02-04 RX ORDER — HYDROMORPHONE HYDROCHLORIDE 2 MG/ML
0.5 INJECTION, SOLUTION INTRAMUSCULAR; INTRAVENOUS; SUBCUTANEOUS EVERY 5 MIN PRN
Status: DISPENSED | OUTPATIENT
Start: 2019-02-04 | End: 2019-02-04

## 2019-02-04 RX ORDER — SODIUM CHLORIDE 0.9 % (FLUSH) 0.9 %
5 SYRINGE (ML) INJECTION
Status: DISCONTINUED | OUTPATIENT
Start: 2019-02-04 | End: 2019-02-04 | Stop reason: HOSPADM

## 2019-02-04 RX ORDER — LIDOCAINE HCL/PF 100 MG/5ML
SYRINGE (ML) INTRAVENOUS
Status: DISCONTINUED | OUTPATIENT
Start: 2019-02-04 | End: 2019-02-05

## 2019-02-04 RX ORDER — HYDROMORPHONE HYDROCHLORIDE 2 MG/1
2 TABLET ORAL EVERY 4 HOURS PRN
Qty: 20 TABLET | Refills: 0 | Status: SHIPPED | OUTPATIENT
Start: 2019-02-04 | End: 2019-03-26

## 2019-02-04 RX ORDER — MORPHINE SULFATE 2 MG/ML
2 INJECTION, SOLUTION INTRAMUSCULAR; INTRAVENOUS EVERY 4 HOURS PRN
Status: DISCONTINUED | OUTPATIENT
Start: 2019-02-04 | End: 2019-02-04

## 2019-02-04 RX ORDER — GLYCOPYRROLATE 0.2 MG/ML
INJECTION INTRAMUSCULAR; INTRAVENOUS
Status: DISCONTINUED | OUTPATIENT
Start: 2019-02-04 | End: 2019-02-05

## 2019-02-04 RX ORDER — PROMETHAZINE HYDROCHLORIDE 12.5 MG/1
12.5 TABLET ORAL EVERY 4 HOURS PRN
Status: CANCELLED | OUTPATIENT
Start: 2019-02-04

## 2019-02-04 RX ORDER — ASPIRIN 81 MG
100 TABLET, DELAYED RELEASE (ENTERIC COATED) ORAL 2 TIMES DAILY
Refills: 0 | COMMUNITY
Start: 2019-02-04 | End: 2019-03-26

## 2019-02-04 RX ORDER — ROCURONIUM BROMIDE 10 MG/ML
INJECTION, SOLUTION INTRAVENOUS
Status: DISCONTINUED | OUTPATIENT
Start: 2019-02-04 | End: 2019-02-05

## 2019-02-04 RX ORDER — FENTANYL CITRATE 50 UG/ML
INJECTION, SOLUTION INTRAMUSCULAR; INTRAVENOUS
Status: DISCONTINUED | OUTPATIENT
Start: 2019-02-04 | End: 2019-02-05

## 2019-02-04 RX ORDER — ONDANSETRON 2 MG/ML
4 INJECTION INTRAMUSCULAR; INTRAVENOUS EVERY 8 HOURS PRN
Status: DISCONTINUED | OUTPATIENT
Start: 2019-02-04 | End: 2019-02-04 | Stop reason: HOSPADM

## 2019-02-04 RX ORDER — IBUPROFEN 800 MG/1
800 TABLET ORAL EVERY 8 HOURS
Qty: 20 TABLET
Start: 2019-02-04 | End: 2019-02-09

## 2019-02-04 RX ORDER — METRONIDAZOLE 500 MG/100ML
500 INJECTION, SOLUTION INTRAVENOUS
Status: DISCONTINUED | OUTPATIENT
Start: 2019-02-04 | End: 2019-02-04 | Stop reason: HOSPADM

## 2019-02-04 RX ORDER — SUCCINYLCHOLINE CHLORIDE 20 MG/ML
INJECTION INTRAMUSCULAR; INTRAVENOUS
Status: DISCONTINUED | OUTPATIENT
Start: 2019-02-04 | End: 2019-02-05

## 2019-02-04 RX ORDER — NEOSTIGMINE METHYLSULFATE 1 MG/ML
INJECTION, SOLUTION INTRAVENOUS
Status: DISCONTINUED | OUTPATIENT
Start: 2019-02-04 | End: 2019-02-05

## 2019-02-04 RX ORDER — BUPIVACAINE HYDROCHLORIDE 2.5 MG/ML
INJECTION, SOLUTION EPIDURAL; INFILTRATION; INTRACAUDAL
Status: DISCONTINUED | OUTPATIENT
Start: 2019-02-04 | End: 2019-02-04 | Stop reason: HOSPADM

## 2019-02-04 RX ORDER — ACETAZOLAMIDE 250 MG/1
250 TABLET ORAL 3 TIMES DAILY
Status: DISCONTINUED | OUTPATIENT
Start: 2019-02-04 | End: 2019-02-04 | Stop reason: HOSPADM

## 2019-02-04 RX ORDER — PHENYLEPHRINE HYDROCHLORIDE 10 MG/ML
INJECTION INTRAVENOUS
Status: DISCONTINUED | OUTPATIENT
Start: 2019-02-04 | End: 2019-02-05

## 2019-02-04 RX ORDER — ONDANSETRON 2 MG/ML
4 INJECTION INTRAMUSCULAR; INTRAVENOUS DAILY PRN
Status: DISCONTINUED | OUTPATIENT
Start: 2019-02-04 | End: 2019-02-04 | Stop reason: HOSPADM

## 2019-02-04 RX ADMIN — PHENYLEPHRINE HYDROCHLORIDE 80 MCG: 10 INJECTION INTRAVENOUS at 01:02

## 2019-02-04 RX ADMIN — NEOSTIGMINE METHYLSULFATE 4 MG: 1 INJECTION INTRAVENOUS at 01:02

## 2019-02-04 RX ADMIN — MORPHINE SULFATE 2 MG: 2 INJECTION, SOLUTION INTRAMUSCULAR; INTRAVENOUS at 10:02

## 2019-02-04 RX ADMIN — ACETAMINOPHEN 1000 MG: 10 INJECTION, SOLUTION INTRAVENOUS at 01:02

## 2019-02-04 RX ADMIN — GLYCOPYRROLATE 0.8 MG: 0.2 INJECTION, SOLUTION INTRAMUSCULAR; INTRAVENOUS at 01:02

## 2019-02-04 RX ADMIN — DEXAMETHASONE SODIUM PHOSPHATE 4 MG: 4 INJECTION, SOLUTION INTRAMUSCULAR; INTRAVENOUS at 01:02

## 2019-02-04 RX ADMIN — MORPHINE SULFATE 6 MG: 2 INJECTION, SOLUTION INTRAMUSCULAR; INTRAVENOUS at 02:02

## 2019-02-04 RX ADMIN — SODIUM CHLORIDE, SODIUM LACTATE, POTASSIUM CHLORIDE, AND CALCIUM CHLORIDE: .6; .31; .03; .02 INJECTION, SOLUTION INTRAVENOUS at 12:02

## 2019-02-04 RX ADMIN — METRONIDAZOLE 500 MG: 500 INJECTION, SOLUTION INTRAVENOUS at 05:02

## 2019-02-04 RX ADMIN — FENTANYL CITRATE 100 MCG: 50 INJECTION, SOLUTION INTRAMUSCULAR; INTRAVENOUS at 12:02

## 2019-02-04 RX ADMIN — SUCCINYLCHOLINE CHLORIDE 140 MG: 20 INJECTION, SOLUTION INTRAMUSCULAR; INTRAVENOUS at 12:02

## 2019-02-04 RX ADMIN — ONDANSETRON 4 MG: 2 INJECTION, SOLUTION INTRAMUSCULAR; INTRAVENOUS at 01:02

## 2019-02-04 RX ADMIN — EPHEDRINE SULFATE 10 MG: 50 INJECTION, SOLUTION INTRAMUSCULAR; INTRAVENOUS; SUBCUTANEOUS at 01:02

## 2019-02-04 RX ADMIN — SODIUM CHLORIDE, SODIUM LACTATE, POTASSIUM CHLORIDE, AND CALCIUM CHLORIDE: .6; .31; .03; .02 INJECTION, SOLUTION INTRAVENOUS at 04:02

## 2019-02-04 RX ADMIN — ONDANSETRON 4 MG: 2 INJECTION INTRAMUSCULAR; INTRAVENOUS at 12:02

## 2019-02-04 RX ADMIN — FENTANYL CITRATE 50 MCG: 50 INJECTION, SOLUTION INTRAMUSCULAR; INTRAVENOUS at 01:02

## 2019-02-04 RX ADMIN — ROCURONIUM BROMIDE 5 MG: 10 INJECTION, SOLUTION INTRAVENOUS at 12:02

## 2019-02-04 RX ADMIN — ROCURONIUM BROMIDE 20 MG: 10 INJECTION, SOLUTION INTRAVENOUS at 01:02

## 2019-02-04 RX ADMIN — PROPOFOL 200 MG: 10 INJECTION, EMULSION INTRAVENOUS at 12:02

## 2019-02-04 RX ADMIN — PROPOFOL 150 MCG/KG/MIN: 10 INJECTION, EMULSION INTRAVENOUS at 12:02

## 2019-02-04 RX ADMIN — MORPHINE SULFATE 2 MG: 2 INJECTION, SOLUTION INTRAMUSCULAR; INTRAVENOUS at 05:02

## 2019-02-04 RX ADMIN — MIDAZOLAM 2 MG: 1 INJECTION INTRAMUSCULAR; INTRAVENOUS at 12:02

## 2019-02-04 RX ADMIN — ONDANSETRON 4 MG: 2 INJECTION INTRAMUSCULAR; INTRAVENOUS at 02:02

## 2019-02-04 RX ADMIN — MORPHINE SULFATE 4 MG: 4 INJECTION INTRAVENOUS at 12:02

## 2019-02-04 RX ADMIN — HYDROMORPHONE HYDROCHLORIDE 0.5 MG: 2 INJECTION INTRAMUSCULAR; INTRAVENOUS; SUBCUTANEOUS at 04:02

## 2019-02-04 RX ADMIN — HEPARIN SODIUM 5000 UNITS: 5000 INJECTION, SOLUTION INTRAVENOUS; SUBCUTANEOUS at 01:02

## 2019-02-04 RX ADMIN — SODIUM CHLORIDE 1000 ML: 0.9 INJECTION, SOLUTION INTRAVENOUS at 12:02

## 2019-02-04 RX ADMIN — CIPROFLOXACIN 400 MG: 2 INJECTION, SOLUTION INTRAVENOUS at 04:02

## 2019-02-04 RX ADMIN — MORPHINE SULFATE 2 MG: 2 INJECTION, SOLUTION INTRAMUSCULAR; INTRAVENOUS at 07:02

## 2019-02-04 RX ADMIN — PROMETHAZINE HYDROCHLORIDE 6.25 MG: 25 INJECTION INTRAMUSCULAR; INTRAVENOUS at 04:02

## 2019-02-04 RX ADMIN — METRONIDAZOLE 500 MG: 500 INJECTION, SOLUTION INTRAVENOUS at 01:02

## 2019-02-04 RX ADMIN — LIDOCAINE HYDROCHLORIDE 80 MG: 20 INJECTION, SOLUTION INTRAVENOUS at 12:02

## 2019-02-04 RX ADMIN — PHENYLEPHRINE HYDROCHLORIDE 100 MCG: 10 INJECTION INTRAVENOUS at 01:02

## 2019-02-04 RX ADMIN — METOPROLOL TARTRATE 5 MG: 5 INJECTION, SOLUTION INTRAVENOUS at 01:02

## 2019-02-04 RX ADMIN — EPHEDRINE SULFATE 15 MG: 50 INJECTION, SOLUTION INTRAMUSCULAR; INTRAVENOUS; SUBCUTANEOUS at 01:02

## 2019-02-04 RX ADMIN — IOHEXOL 100 ML: 350 INJECTION, SOLUTION INTRAVENOUS at 02:02

## 2019-02-04 NOTE — BRIEF OP NOTE
[facility]  Brief Operative Note    SUMMARY     Surgery Date: 2/4/2019     Surgeon(s) and Role:     * Maikol Banks Jr., MD - Primary    Assisting Surgeon: Unique Tomlinson MD (Resident)    Pre-op Diagnosis:  Acute appendicitis [K35.80]    Post-op Diagnosis:  Post-Op Diagnosis Codes:     * Acute appendicitis [K35.80]    Procedure(s) (LRB):  APPENDECTOMY, LAPAROSCOPIC (N/A)    Anesthesia: General    Description of Procedure: Laparoscopic appendectomy    Description of the findings of the procedure: Moderate inflammatory change of the appendix    Estimated Blood Loss: 10cc         Specimens:   Specimen (12h ago, onward)    Start     Ordered    02/04/19 1340  Specimen to Pathology - Surgery  Once     Comments:  Pre-op Diagnosis: Acute appendicitis [K35.80]Post-op Diagnosis: SameProcedure(s):APPENDECTOMY, LAPAROSCOPIC Number of specimens: 1Name of specimens: 1. Appendix-perm     Start Status   02/04/19 1340 Needs to be Collected       02/04/19 1340

## 2019-02-04 NOTE — ED NOTES
"Pt presents to the ED with c/o RUQ abdominal pain. Pt states "it started earlier today and after I ate I threw up what I ate." pt states she had a little food intake today. Pt reports pain is "stabbing/burning" in nature. Pt also with c/o nausea at this time. Pt denies cp, sob, headache, weakness, bladder or bowel changes at this time. Pt father at bedside.   "

## 2019-02-04 NOTE — DISCHARGE SUMMARY
[facility]  Discharge Note  Short Stay    Admit Date: 2/3/2019    Discharge Date and Time: No discharge date for patient encounter.     Attending Physician: Maikol Banks Jr.*     Discharge Provider: Maikol Banks Jr    Diagnoses:  Active Hospital Problems    Diagnosis  POA    *Acute appendicitis [K35.80]  Yes    RUQ abdominal pain [R10.11]  Yes      Resolved Hospital Problems   No resolved problems to display.       Discharged Condition: good    Hospital Course: Patient was admitted for an outpatient procedure and tolerated the procedure well with no complications.     Patient presented with acute appendicitis with leukocytosis.  She was started on IV Cipro Flagyl scheduled for laparoscopic appendectomy which she underwent on 02/04/2019.  Surgery was uneventful and patient was stable enough for discharge home postoperatively.    Final Diagnoses: Same as principal problem.    Disposition: Home or Self Care    Follow up/Patient Instructions:    Medications:  Reconciled Home Medications:      Medication List      START taking these medications    acetaminophen 500 MG tablet  Commonly known as:  TYLENOL  Take 2 tablets (1,000 mg total) by mouth every 8 (eight) hours. for 3 days     docusate sodium 100 mg capsule  Take 100 mg by mouth 2 (two) times daily.     HYDROmorphone 2 MG tablet  Commonly known as:  DILAUDID  Take 1 tablet (2 mg total) by mouth every 4 (four) hours as needed for Pain.     ibuprofen 800 MG tablet  Commonly known as:  ADVIL,MOTRIN  Take 1 tablet (800 mg total) by mouth every 8 (eight) hours. for 5 days        CONTINUE taking these medications    acetaZOLAMIDE 250 MG tablet  Commonly known as:  DIAMOX  TAKE ONE TABLET BY MOUTH THREE TIMES A DAY     buPROPion 200 MG Sr12  Commonly known as:  WELLBUTRIN SR  200 mg 2 (two) times daily.     lamoTRIgine 150 MG Tab  Commonly known as:  LAMICTAL  150 mg 2 (two) times daily.     norethindrone 0.35 mg tablet  Commonly known as:   MICRONOR     sumatriptan 100 MG tablet  Commonly known as:  IMITREX  Take 1 tablet (100 mg total) by mouth every 2 (two) hours as needed for Migraine (Max 2 tabs per 24 hours and 12 tablets per week).     TRINTELLIX 10 mg Tab  Generic drug:  vortioxetine  10 mg once daily.     VITAMIN D2 50,000 unit Cap  Generic drug:  ergocalciferol  Take 50,000 Units by mouth every 7 days.          Discharge Procedure Orders   Diet Adult Regular     Lifting restrictions   Order Comments: No lifting greater than 20 lb, no straining, no strenuous activity for 4 weeks     Notify your health care provider if you experience any of the following:  temperature >100.4     Notify your health care provider if you experience any of the following:  persistent nausea and vomiting or diarrhea     Notify your health care provider if you experience any of the following:  severe uncontrolled pain     Notify your health care provider if you experience any of the following:  redness, tenderness, or signs of infection (pain, swelling, redness, odor or green/yellow discharge around incision site)     Notify your health care provider if you experience any of the following:  difficulty breathing or increased cough     Notify your health care provider if you experience any of the following:  severe persistent headache     Notify your health care provider if you experience any of the following:  worsening rash     Notify your health care provider if you experience any of the following:  persistent dizziness, light-headedness, or visual disturbances     Notify your health care provider if you experience any of the following:  increased confusion or weakness     No dressing needed   Order Comments: - A surgical glue has been placed over your incisions. Please leave the glue in place and do not attempt to remove it.   - It is ok to shower using mild soap and water over the incisions the day after your procedure. Pat dry your incisions. Do not soak in a bath  tub or other body of water for 2 weeks or until cleared by your surgeon.   - If you noticed redness, swelling, fever, increasing pain or significant drainage from your wound please call the office or the hospital  after hours.     Shower on day dressing removed (No bath)         Discharge Procedure Orders (must include Diet, Follow-up, Activity):   Discharge Procedure Orders (must include Diet, Follow-up, Activity)   Diet Adult Regular     Lifting restrictions   Order Comments: No lifting greater than 20 lb, no straining, no strenuous activity for 4 weeks     Notify your health care provider if you experience any of the following:  temperature >100.4     Notify your health care provider if you experience any of the following:  persistent nausea and vomiting or diarrhea     Notify your health care provider if you experience any of the following:  severe uncontrolled pain     Notify your health care provider if you experience any of the following:  redness, tenderness, or signs of infection (pain, swelling, redness, odor or green/yellow discharge around incision site)     Notify your health care provider if you experience any of the following:  difficulty breathing or increased cough     Notify your health care provider if you experience any of the following:  severe persistent headache     Notify your health care provider if you experience any of the following:  worsening rash     Notify your health care provider if you experience any of the following:  persistent dizziness, light-headedness, or visual disturbances     Notify your health care provider if you experience any of the following:  increased confusion or weakness     No dressing needed   Order Comments: - A surgical glue has been placed over your incisions. Please leave the glue in place and do not attempt to remove it.   - It is ok to shower using mild soap and water over the incisions the day after your procedure. Pat dry your incisions. Do not  soak in a bath tub or other body of water for 2 weeks or until cleared by your surgeon.   - If you noticed redness, swelling, fever, increasing pain or significant drainage from your wound please call the office or the hospital  after hours.     Shower on day dressing removed (No bath)

## 2019-02-04 NOTE — ED NOTES
Updated pt and mother on plan of care. Both verbalized understanding. Pt denies any needs at this time.

## 2019-02-04 NOTE — DISCHARGE INSTRUCTIONS
After Laparoscopic Appendectomy (Appendix Removal)  You have had a surgery to remove your appendix. The appendix is a narrow pouch attached to the lower right part of your large intestine. During your surgery, the doctor made 2 to 4 small incisions. One was near your belly button, and the others were elsewhere on your abdomen. Through one incision, the doctor inserted a thin tube with a camera attached (laparoscope). Other surgery tools were used in the other incisions.  While you recover you may have pain in your shoulder and chest for up to 48 hours after surgery. This is common. It is caused by carbon dioxide gas used during the surgery. It will go away.   Home care  · Keep your incisions clean and dry.  · Don't pull off the thin strips of tape covering your incision. They should fall off on their own in a week or so.  · Wear loose-fitting clothes. This will help cause less irritation around your incisions.  · You can shower as usual. Gently wash around your incisions with soap and water. Dont take a bath until your incisions are fully healed.  · Dont drive until you have stopped taken prescription pain medicine.  · Dont lift anything heavier than 10 pounds until your healthcare provider says its OK.  · Limit sports and strenuous activities for 1 or 2 weeks.  · Resume light activities around your home as soon as you feel comfortable.  What to eat  Eat a bland, low-fat diet. This can include foods such as:  · Well-cooked soft cereals  · Mashed potatoes  · Plain toast or bread  · Plain crackers  · Plain pasta  · Rice  · Cottage cheese  · Pudding  · Low-fat yogurt  · Low-fat milk  · Ripe bananas  Drink 6 to 8 glasses of water a day, unless directed otherwise. If you are constipated, take a fiber laxative or a stool softener.  When to call your healthcare provider   Call your healthcare provider right away if you have any of the following:  · Swelling, pain, fluid, or redness in the incision that gets  worse  · Fever of 100.4°F (38°C) or higher, or as directed by your healthcare provider  · Belly (abdominal) pain that gets worse  · Severe diarrhea, bloating, or constipation  · Nausea or vomiting   Date Last Reviewed: 10/1/2016  © 5783-2986 [employer]. [address] [place] [address]. All rights reserved. This information is not intended as a substitute for professional medical care. Always follow your healthcare professional's instructions.  ANESTHESIA  -For the first 24 hours after surgery:  Do not drive, use heavy equipment, make important decisions, or drink alcohol  -It is normal to feel sleepy for several hours.  Rest until you are more awake.  -Have someone stay with you, if needed.  They can watch for problems and help keep you safe.  -Some possible post anesthesia side effects include: nausea and vomiting, sore throat and hoarseness, sleepiness, and dizziness.    PAIN  -If you have pain after surgery, pain medicine will help you feel better.  Take it as directed, before pain becomes severe.  Most pain relievers taken by mouth need at least 20-30 minutes to start working.  -Do not drive or drink alcohol while taking pain medicine.  -Pain medication can upset your stomach.  Taking them with a little food may help.  -Other ways to help control pain: elevation, ice, and relaxation  -Call your surgeon if still having unmanageable pain an hour after taking pain medicine.  -Pain medicine can cause constipation.  Taking an over-the counter stool softener while on prescription pain medicine and drinking plenty of fluids can prevent this side effect.  -Call your surgeon if you have severe side effects like: breathing problems, trouble waking up, dizziness, confusion, or severe constipation.    NAUSEA  -Some people have nausea after surgery.  This is often because of anesthesia, pain, pain medicine, or the stress of surgery.  -Do not push yourself to eat.  Start off with clear liquids and  soup.  Slowly move to solid foods.  Don't eat fatty, rich, spicy foods at first.  Eat smaller amounts.  -If you develop persistent nausea and vomiting please notify your surgeon immediately.    BLEEDING  -Different types of surgery require different types of care and dressing changes.  It is important to follow all instructions and advice from your surgeon.  Change dressing as directed.  Call your surgeon for any concerns regarding postop bleeding.    SIGNS OF INFECTION  -Signs of infection include: fever, swelling, drainage, and redness  -Notify your surgeon if you have a fever of 100.4 F (38.0 C) or higher.  -Notify your surgeon if you notice redness, swelling, increased pain, pus, or a foul smell at the incision site.    Hydromorphone tablets  What is this medicine?  HYDROMORPHONE (yajaira droe MOR fone) is a pain reliever. It is used to treat moderate to severe pain.  How should I use this medicine?  Take this medicine by mouth with a glass of water. Follow the directions on the prescription label. You can take this medicine with or without food. If it upsets your stomach, take it with food. Take your medicine at regular intervals. Do not take it more often than directed. Do not stop taking except on your doctor's advice.  A special MedGuide will be given to you by the pharmacist with each prescription and refill. Be sure to read this information carefully each time.  Talk to your pediatrician regarding the use of this medicine in children. Special care may be needed.  What side effects may I notice from receiving this medicine?  Side effects that you should report to your doctor or health care professional as soon as possible:  · allergic reactions like skin rash, itching or hives, swelling of the face, lips, or tongue  · breathing problems  · confusion  · seizures  · signs and symptoms of low blood pressure like dizziness; feeling faint or lightheaded, falls; unusually weak or tired  · trouble passing urine or  change in the amount of urine  Side effects that usually do not require medical attention (report to your doctor or health care professional if they continue or are bothersome):  · constipation  · dry mouth  · nausea, vomiting  · tiredness  What may interact with this medicine?  This medicine may interact with the following medications:  · alcohol  · antihistamines for allergy, cough and cold  · certain medicines for anxiety or sleep  · certain medicines for depression like amitriptyline, fluoxetine, sertraline  · certain medicines for seizures like phenobarbital, primidone  · general anesthetics like halothane, isoflurane, methoxyflurane, propofol  · local anesthetics like lidocaine, pramoxine, tetracaine  · MAOIs like Carbex, Eldepryl, Marplan, Nardil, and Parnate  · medicines that relax muscles for surgery  · other narcotic medicines for pain or cough  · phenothiazines like chlorpromazine, mesoridazine, prochlorperazine, thioridazine  What if I miss a dose?  If you miss a dose, take it as soon as you can. If it is almost time for your next dose, take only that dose. Do not take double or extra doses.  Where should I keep my medicine?  Keep out of the reach of children. This medicine can be abused. Keep your medicine in a safe place to protect it from theft. Do not share this medicine with anyone. Selling or giving away this medicine is dangerous and against the law.  Store at room temperature between 15 and 30 degrees C (59 and 86 degrees F). Keep container tightly closed. Protect from light.  This medicine may cause accidental overdose and death if it is taken by other adults, children, or pets. Flush any unused medicine down the toilet to reduce the chance of harm. Do not use the medicine after the expiration date.  What should I tell my health care provider before I take this medicine?  They need to know if you have any of these conditions:  · brain tumor  · drug abuse or addiction  · head injury  · heart  disease  · if you often drink alcohol  · kidney disease  · liver disease  · lung or breathing disease, like asthma  · problems urinating  · seizures  · stomach or intestine problems  · an unusual or allergic reaction to hydromorphone, other medicines, foods, dyes, or preservatives  · pregnant or trying to get pregnant  · breast-feeding  What should I watch for while using this medicine?  Tell your doctor or health care professional if your pain does not go away, if it gets worse, or if you have new or a different type of pain. You may develop tolerance to the medicine. Tolerance means that you will need a higher dose of the medicine for pain relief. Tolerance is normal and is expected if you take this medicine for a long time.  Do not suddenly stop taking your medicine because you may develop a severe reaction. Your body becomes used to the medicine. This does NOT mean you are addicted. Addiction is a behavior related to getting and using a drug for a non-medical reason. If you have pain, you have a medical reason to take pain medicine. Your doctor will tell you how much medicine to take. If your doctor wants you to stop the medicine, the dose will be slowly lowered over time to avoid any side effects.  There are different types of narcotic medicines (opiates). If you take more than one type at the same time or if you are taking another medicine that also causes drowsiness, you may have more side effects. Give your health care provider a list of all medicines you use. Your doctor will tell you how much medicine to take. Do not take more medicine than directed. Call emergency for help if you have problems breathing or unusual sleepiness.  You may get drowsy or dizzy. Do not drive, use machinery, or do anything that needs mental alertness until you know how this medicine affects you. Do not stand or sit up quickly, especially if you are an older patient. This reduces the risk of dizzy or fainting spells. Alcohol may  interfere with the effect of this medicine. Avoid alcoholic drinks.  This medicine will cause constipation. Try to have a bowel movement at least every 2 to 3 days. If you do not have a bowel movement for 3 days, call your doctor or health care professional.  Your mouth may get dry. Chewing sugarless gum or sucking hard candy, and drinking plenty of water may help. Contact your doctor if the problem does not go away or is severe.  NOTE:This sheet is a summary. It may not cover all possible information. If you have questions about this medicine, talk to your doctor, pharmacist, or health care provider. Copyright© 2017 [employer]

## 2019-02-04 NOTE — ED PROVIDER NOTES
Encounter Date: 2/3/2019    SCRIBE #1 NOTE: I, Zunilda Fleming, am scribing for, and in the presence of,  Dr. Alexandra. I have scribed the entire note.       History     Chief Complaint   Patient presents with    Abdominal Pain     since 1230 today. reports pain to RUQ, states pain worse with standing and movement. reports little intake today, but eating made pain worse. describes as burning/ stabbing.      Sarah Ulloa is a 38 y.o. 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    The patient presents to the ED due to upper abdominal pain. She reports onset of symptoms was 12 hours ago. The patient notes she began suddenly with aching abdominal pain. She also notes a sharp pain in the upper right abdomen. The patient had one episode of vomiting earlier but she denies any blood in vomit, diarrhea, urinary symptoms, vaginal bleeding, vaginal discharge, fever or chills. She reported to nursing staff the pain became worse with eating. The patient denies experiencing similar symptoms in the past      The history is provided by the patient.     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     Morbid obesity with BMI of 45.0-49.9, adult     Thyroid nodule 5/29/2017     Past Surgical History:   Procedure Laterality Date    ANTERIOR CERVICAL DISCECTOMY W/ FUSION  2016    ESOPHAGEAL BRAVO PH N/A  2017    [facility]formed by [facility] at [facility] (4TH FLR)    ESOPHAGOGASTRODUODENOSCOPY      ESOPHAGOGASTRODUODENOSCOPY (EGD) N/A 2017    Performed by [facility] at [facility] END[facility] (4TH FLR)    [id number] N/A 3/3/2017    Performed by Jonathon Peck [facility] at [facility] OR 2ND FLR    Laparoscopic hh with Toupet      laproscopic REPAIR HERNIA LAPAROSCOPIC HIATAL N/A 3/3/2017    Performed by [facility] at [facility] OR 2ND FLR    TONSILLECTOMY       Family History   Problem Relation Age of Onset    Hypertension Father     Diabetes Paternal Grandfather     Cancer Neg Hx     Heart disease Neg Hx     Stroke Neg Hx      Social History     Tobacco Use    Smoking status: Former Smoker     Packs/day: 0.25     Years: 10.00     Pack years: 2.50     Types: Cigarettes     Last attempt to quit: 2012     Years since quittin.9    Smokeless tobacco: Never Used    Tobacco comment: quit smoking    Substance Use Topics    Alcohol use: No    Drug use: No     Review of Systems   Constitutional: Negative for chills and fever.   HENT: Negative for congestion, rhinorrhea and sore throat.    Eyes: Negative for redness and visual disturbance.   Respiratory: Negative for cough, shortness of breath and wheezing.    Cardiovascular: Negative for chest pain and palpitations.   Gastrointestinal: Positive for abdominal pain and vomiting. Negative for diarrhea and nausea.   Genitourinary: Negative for dysuria and hematuria.   Musculoskeletal: Negative for back pain, myalgias and neck pain.   Skin: Negative for rash.   Neurological: Negative for dizziness, weakness and light-headedness.   Psychiatric/Behavioral: Negative for confusion.       Physical Exam     Initial Vitals [19 2253]   BP Pulse Resp Temp SpO2   (!) 159/91 100 15 98.4 °F (36.9 °C) 98 %      MAP       --         Physical Exam    Nursing note and vitals reviewed.  Constitutional: She appears  well-developed and well-nourished. She is not diaphoretic. No distress.   HENT:   Head: Normocephalic and atraumatic.   Right Ear: External ear normal.   Left Ear: External ear normal.   Nose: Nose normal.   Eyes: Conjunctivae and EOM are normal. Pupils are equal, round, and reactive to light.   Cardiovascular: Normal rate, regular rhythm and normal heart sounds. Exam reveals no gallop and no friction rub.    No murmur heard.  Pulmonary/Chest: Breath sounds normal. No respiratory distress. She has no wheezes. She has no rhonchi. She has no rales.   Abdominal: Soft. Bowel sounds are normal. She exhibits no distension. There is tenderness in the right upper quadrant and epigastric area. There is no rebound and no guarding.   Musculoskeletal: Normal range of motion. She exhibits no edema or tenderness.   Neurological: She is alert and oriented to person, place, and time. No cranial nerve deficit. GCS score is 15. GCS eye subscore is 4. GCS verbal subscore is 5. GCS motor subscore is 6.   Skin: Skin is warm and dry. Capillary refill takes less than 2 seconds. No rash noted.         ED Course   Procedures  Labs Reviewed   CBC W/ AUTO DIFFERENTIAL - Abnormal; Notable for the following components:       Result Value    WBC 15.11 (*)     Gran # (ANC) 11.3 (*)     Gran% 75.1 (*)     Lymph% 16.6 (*)     All other components within normal limits   COMPREHENSIVE METABOLIC PANEL - Abnormal; Notable for the following components:    Chloride 111 (*)     CO2 18 (*)     Glucose 112 (*)     All other components within normal limits   URINALYSIS, REFLEX TO URINE CULTURE - Abnormal; Notable for the following components:    Occult Blood UA 3+ (*)     All other components within normal limits    Narrative:     Preferred Collection Type->Urine, Clean Catch   URINALYSIS MICROSCOPIC - Abnormal; Notable for the following components:    RBC, UA 8 (*)     All other components within normal limits    Narrative:     Preferred Collection  Type->Urine, Clean Catch   LIPASE   POCT URINE PREGNANCY          Imaging Results          CT Abdomen Pelvis With Contrast (Final result)     Abnormal  Result time 02/04/19 02:53:11    Final result by Amauri Mejia MD (02/04/19 02:53:11)                 Impression:      1.  Mildly dilated appendiceal base with adjacent periappendiceal inflammatory change and possible small appendicolith at the base of the appendix.  The more distal appendix appears normal in caliber, however findings are suspicious for early acute appendicitis.  Correlation with patient's physical exam and appropriate lab values advised.    2.  Postsurgical change likely related to prior fundoplication.  Small hiatal hernia.    3.  Nonobstructing left renal calculus.    This report was flagged in Epic as abnormal.      Electronically signed by: Amauri Mejia MD  Date:    02/04/2019  Time:    02:53             Narrative:    EXAMINATION:  CT ABDOMEN PELVIS WITH CONTRAST    CLINICAL HISTORY:  abdominal pain;    TECHNIQUE:  Low dose axial images, sagittal and coronal reformations were obtained from the lung bases to the pubic symphysis following the IV administration of 100 mL of Omnipaque 350 .  Oral contrast was not given.    COMPARISON:  Ultrasound abdomen limited 02/04/2019    FINDINGS:  There is bibasilar atelectasis.  No significant pleural effusion.  The visualized portions of the heart appear normal.    The liver is normal in size and attenuation with no focal hepatic abnormality.  The gallbladder shows no evidence of stones or pericholecystic fluid.  There is no intra-or extrahepatic biliary ductal dilatation.    There are apparent postsurgical changes the gastroesophageal junction, possibly related to prior fundoplication.  There is a small hiatal hernia.  The stomach otherwise appears within normal limits.  The pancreas, spleen, and adrenal glands are unremarkable.    The kidneys are normal in size and location and enhance  symmetrically.  There is a punctate nonobstructing left renal calculus.  There is no evidence of hydronephrosis. The urinary bladder is unremarkable. The uterus and adnexa appear within normal limits.  No significant free fluid within the pelvis.    The abdominal aorta is normal in course and caliber without significant atherosclerotic calcifications.    The base of the appendix is mildly dilated measuring 0.9 cm with mild periappendiceal inflammatory change.  There is a possible tiny appendicolith at the base of the appendiceal lumen.  The more distal appendix appears normal in caliber.  There is no evidence of small-bowel obstruction.  The remaining visualized small and large bowel demonstrate no abnormal inflammatory change.  There is no free intraperitoneal air, ascites, or portal venous gas.    When viewed with bone windows the osseous structures are unremarkable.  The extraperitoneal soft tissues are unremarkable.                               US Abdomen Limited (Gallbladder) (Final result)  Result time 02/04/19 01:37:15    Final result by Linda Main MD (02/04/19 01:37:15)                 Impression:      No significant abnormalities identified.      Electronically signed by: Linda Main MD  Date:    02/04/2019  Time:    01:37             Narrative:    EXAMINATION:  US ABDOMEN LIMITED    CLINICAL HISTORY:  Right upper quadrant pain    TECHNIQUE:  Limited ultrasound of the right upper quadrant of the abdomen (including pancreas, liver, gallbladder, common bile duct, and right kidney) was performed.    COMPARISON:  None.    FINDINGS:  The liver is normal in size measuring 13.9cm.  Hepatic parenchyma is homogeneous without evidence for masses.  No intra- or extrahepatic biliary ductal dilatation. The common bile duct measures 0.3 cm.  The gallbladder appears normal. No evidence for cholelithiasis.  Sonographic Todd's sign is negative. The visualized portions of the pancreas and IVC appear normal. The  right kidney measures 9.0 cm. No ascites.                                 Medical Decision Making:   Initial Assessment:   The patient presents to the ED due to upper abdominal pain.  Differential Diagnosis:   Gastroenteritis, gastritis, ulcer, cholecystitis, gallstones, pancreatitis, ileus, small bowel obstruction, appendicitis, constipation.     Clinical Tests:   Lab Tests: Ordered and Reviewed  Radiological Study: Ordered and Reviewed  ED Management:  Patient to be admitted for appendicitis                   ED Course as of Feb 04 0355   Sun Feb 03, 2019   2339 BP: (!) 159/91 [LD]   2340 Temp: 98.4 °F (36.9 °C) [LD]   2340 Pulse: 100 [LD]   2340 Resp: 15 [LD]   2340 SpO2: 98 % [LD]   Mon Feb 04, 2019   0157 BP: 117/69 [LD]   0157 Pulse: 89 [LD]   0157 Resp: 18 [LD]   0157 SpO2: 99 % [LD]   0212 Patient continues to c/o 6/10 abdominal pain  [LD]   0339 WBC: (!) 15.11 [LD]   0354 Discussed with Dr Banks who would like patient given IV abx and placed in obs  [LD]      ED Course User Index  [LD] Cathryn Alexandra MD     Clinical Impression:     1. RUQ abdominal pain    2. Acute appendicitis        Disposition:   Disposition: Placed in Observation  Condition: Stable    I, Cathryn Alexandra,  personally performed the services described in this documentation. All medical record entries made by the scribe were at my direction and in my presence.  I have reviewed the chart and agree that the record reflects my personal performance and is accurate and complete. Cathryn Alexandra M.D. 3:55 AM02/04/2019                      Cathryn Alexandra MD  02/04/19 0356

## 2019-02-04 NOTE — TRANSFER OF CARE
Anesthesia Transfer of Care Note    Patient: Sarah Ulloa    Procedure(s) Performed: Procedure(s) (LRB):  APPENDECTOMY, LAPAROSCOPIC (N/A)    Patient location: PACU    Anesthesia Type: general    Transport from OR: Transported from OR on 6-10 L/min O2 by face mask with adequate spontaneous ventilation    Post pain: adequate analgesia    Post assessment: no apparent anesthetic complications    Post vital signs: stable    Level of consciousness: awake    Nausea/Vomiting: no nausea/vomiting    Complications: none    Transfer of care protocol was followed      Last vitals:   Visit Vitals  /73 (BP Location: Left arm)   Pulse 102   Temp 37.3 °C (99.2 °F) (Oral)   Resp 16   Ht 5' (1.524 m)   Wt 95.3 kg (210 lb)   LMP 01/16/2019   SpO2 98%   BMI 41.01 kg/m²

## 2019-02-04 NOTE — ANESTHESIA PREPROCEDURE EVALUATION
02/04/2019  Sarah Ulloa is a 38 y.o., female presents for lap appy under GA.    Anesthesia Evaluation    I have reviewed the Patient Summary Reports.    I have reviewed the Nursing Notes.      Review of Systems  Anesthesia Hx:  Hx of Anesthetic complications  Denies Family Hx of Anesthesia complications.  Personal Hx of Anesthesia complications, Post-Operative Nausea/Vomiting   Social:  Non-Smoker    Hematology/Oncology:     Oncology Normal    -- Denies Anemia:   EENT/Dental:EENT/Dental Normal   Cardiovascular:   Exercise tolerance: good Denies Pacemaker.  Denies Valvular problems/Murmurs.  Denies CABG/stent.     Pulmonary:  Pulmonary Normal    Renal/:  Renal/ Normal     Musculoskeletal:   Hx of cervical fusion   Neurological:  Neurology Normal    Endocrine:   Hypothyroidism: hx of nissen.    Dermatological:  Skin Normal    Psych:  Psychiatric Normal           Physical Exam  General:  Obesity    Airway/Jaw/Neck:  Airway Findings: Mouth Opening: Normal Tongue: Normal  General Airway Assessment: Adult  Mallampati: III  Improves to III with phonation.        Eyes/Ears/Nose:  EYES/EARS/NOSE FINDINGS: Normal   Dental:  DENTAL FINDINGS: Normal   Chest/Lungs:  Chest/Lungs Clear    Heart/Vascular:  Heart Findings: Normal Heart murmur: negative    Abdomen:  Abdomen Findings:  obesity   Musculoskeletal:  Musculoskeletal Findings: Normal   Skin:  Skin Findings: Normal    Mental Status:  Mental Status Findings:  Cooperative, Alert and Oriented         Anesthesia Plan  Type of Anesthesia, risks & benefits discussed:  Anesthesia Type:  general  Patient's Preference:   Intra-op Monitoring Plan: standard ASA monitors  Intra-op Monitoring Plan Comments:   Post Op Pain Control Plan: multimodal analgesia  Post Op Pain Control Plan Comments:   Induction:   IV  Beta Blocker:  Patient is not currently on a  Beta-Blocker (No further documentation required).       Informed Consent: Patient understands risks and agrees with Anesthesia plan.  Questions answered. Anesthesia consent signed with patient.  ASA Score: 3     Day of Surgery Review of History & Physical: I have interviewed and examined the patient. I have reviewed the patient's H&P dated:            Ready For Surgery From Anesthesia Perspective.

## 2019-02-04 NOTE — H&P
[facility]  INPATIENT H&P    REASON FOR CONSULT/ADMISSION:  Acute appendicitis    HPI: Sarah Ulloa is a 38 y.o. female presented to the emergency room with approximately 12 hr history of acute generalized abdominal pain which localized to the right lower quadrant. She did have an episode of vomiting associated with pain. Pain is described as sharp.  She denies fevers chills diarrhea.  In the emergency room room she was found have leukocytosis 15,000.  Given the location of her pain or leukocytosis the CT scan was obtained which showed concerns for acute uncomplicated appendicitis.  Surgery is consulted for evaluation.    I have reviewed the patient's chart including prior progress notes, procedures and testing. The patient has a history of pseudotumor cerebri, factor 5 Leiden deficiency, bipolar disorder and GERD status post Nissen fundoplication in 2017.    ROS:   Review of Systems   Constitutional: Positive for appetite change. Negative for activity change, chills and fever.   HENT: Negative for congestion, nosebleeds and trouble swallowing.    Eyes: Negative for photophobia, discharge and visual disturbance.   Respiratory: Negative for apnea, chest tightness and shortness of breath.    Cardiovascular: Negative for chest pain, palpitations and leg swelling.   Gastrointestinal: Positive for abdominal pain ( localized now in the right lower quadrant), nausea and vomiting. Negative for abdominal distention, blood in stool, constipation and diarrhea.   Genitourinary: Negative for difficulty urinating, dysuria and hematuria.   Musculoskeletal: Negative for arthralgias, neck pain and neck stiffness.   Skin: Negative for color change, pallor, rash and wound.   Neurological: Negative for seizures, syncope and light-headedness.   Psychiatric/Behavioral: Negative for agitation, behavioral problems, confusion and decreased concentration.       PROBLEM LIST:  Patient Active Problem List    Diagnosis    BMI 40.0-44.9, adult    Clotting disorder    Thyroid nodule    Chronic left-sided low back pain without sciatica    Bipolar 1 disorder    S/P Nissen fundoplication (without gastrostomy tube) procedure    Factor 5 Leiden mutation, heterozygous    Migraine without aura and without status migrainosus, not intractable    Iron deficiency    Vitamin D deficiency    Prediabetes    Pseudotumor cerebri syndrome    Benign intracranial hypertension    Headache, post-lumbar puncture    Chills (without fever)    History of hypercoagulable state    Acute appendicitis         HISTORY  Past Medical History:   Diagnosis Date    Arthritis     hip    Bipolar 1 disorder     Clotting disorder     Factor 5    GERD (gastroesophageal reflux disease)     Low back pain due to displacement of intervertebral disc     Morbid obesity with BMI of 45.0-49.9, adult     Thyroid nodule 2017       Past Surgical History:   Procedure Laterality Date    ANTERIOR CERVICAL DISCECTOMY W/ FUSION      ESOPHAGEAL BRAVO PH N/A 2017    Performed by [facility]uri [facility] at [facility] ENDO (4TH FLR)    ESOPHAGOGASTRODUODENOSCOPY      ESOPHAGOGASTRODUODENOSCOPY (EGD) N/A 2017    Performed by [facility]uri [facility]ala MD at [facility] ENDO (4TH FLR)    [id number]-BUGEUL-MUGBGIECUIVS N/A 3/3/2017    Performed by Jonathon Peck MD at [facility] OR 2ND FLR    Laparoscopic hh with Toupet      laproscopic REPAIR HERNIA LAPAROSCOPIC HIATAL N/A 3/3/2017    Performed by Jonathon Peck MD at [facility] OR 2ND FLR    TONSILLECTOMY         Social History     Tobacco Use    Smoking status: Former Smoker     Packs/day: 0.25     Years: 10.00     Pack years: 2.50     Types: Cigarettes     Last attempt to quit: 2012     Years since quittin.9    Smokeless tobacco: Never Used    Tobacco comment: quit smoking    Substance Use Topics    Alcohol use: No    Drug use: No       Family History   Problem Relation  Age of Onset    Hypertension Father     Diabetes Paternal Grandfather     Cancer Neg Hx     Heart disease Neg Hx     Stroke Neg Hx          MEDS:  No current facility-administered medications on file prior to encounter.      Current Outpatient Medications on File Prior to Encounter   Medication Sig Dispense Refill    acetaZOLAMIDE (DIAMOX) 250 MG tablet TAKE ONE TABLET BY MOUTH THREE TIMES A DAY 90 tablet 10    buPROPion (WELLBUTRIN SR) 200 MG TbSR 200 mg 2 (two) times daily.      lamotrigine (LAMICTAL) 150 MG Tab 150 mg 2 (two) times daily.      norethindrone (MICRONOR) 0.35 mg tablet       sumatriptan (IMITREX) 100 MG tablet Take 1 tablet (100 mg total) by mouth every 2 (two) hours as needed for Migraine (Max 2 tabs per 24 hours and 12 tablets per week). 9 tablet 6    TRINTELLIX 10 mg Tab 10 mg once daily.      ergocalciferol (VITAMIN D2) 50,000 unit Cap Take 50,000 Units by mouth every 7 days.         ALLERGIES:  Review of patient's allergies indicates:   Allergen Reactions    Percocet [oxycodone-acetaminophen] Nausea And Vomiting    Sulfa (sulfonamide antibiotics) Rash    Benzonatate Itching    Phentermine Rash         VITALS:  Temp:  [98.4 °F (36.9 °C)-98.5 °F (36.9 °C)] 98.5 °F (36.9 °C)  Pulse:  [] 98  Resp:  [15-18] 18  SpO2:  [98 %-99 %] 98 %  BP: (117-159)/(69-91) 119/73    I/O last 3 completed shifts:  In: 1200 [IV Piggyback:1200]  Out: -       PHYSICAL EXAM:  Physical Exam   Constitutional: She is oriented to person, place, and time. She appears well-developed and well-nourished. No distress.   HENT:   Head: Normocephalic and atraumatic.   Nose: Nose normal.   Eyes: Conjunctivae and EOM are normal. No scleral icterus.   Neck: Normal range of motion. Neck supple. No tracheal tenderness present. No tracheal deviation present.   Cardiovascular: Normal rate, regular rhythm and intact distal pulses.   Pulmonary/Chest: Effort normal and breath sounds normal. No accessory muscle usage or  stridor. No respiratory distress. Right breast exhibits no inverted nipple, no mass, no nipple discharge, no skin change and no tenderness. Left breast exhibits no inverted nipple, no mass, no nipple discharge, no skin change and no tenderness. Breasts are symmetrical.   Abdominal: Soft. Normal appearance. She exhibits no distension, no ascites and no mass. There is tenderness. There is guarding (Localized to right lower quadrant). There is no rebound. No hernia. Hernia confirmed negative in the ventral area, confirmed negative in the right inguinal area and confirmed negative in the left inguinal area.   Well-healed port site incisions from laparoscopic Nissen fundoplication   Musculoskeletal: Normal range of motion. She exhibits no edema or deformity.   Lymphadenopathy:     She has no axillary adenopathy. No inguinal adenopathy noted on the right or left side.        Right: No inguinal and no supraclavicular adenopathy present.        Left: No inguinal and no supraclavicular adenopathy present.   Neurological: She is alert and oriented to person, place, and time. She exhibits normal muscle tone.   Skin: Skin is warm and dry. No rash noted. She is not diaphoretic. No erythema.   Psychiatric: She has a normal mood and affect. Her behavior is normal. Judgment and thought content normal.   Vitals reviewed.        LABS:  Lab Results   Component Value Date    WBC 15.11 (H) 02/04/2019    RBC 4.45 02/04/2019    HGB 12.8 02/04/2019    HCT 40.0 02/04/2019     02/04/2019     Lab Results   Component Value Date     (H) 02/04/2019     02/04/2019    K 3.8 02/04/2019     (H) 02/04/2019    CO2 18 (L) 02/04/2019    BUN 8 02/04/2019    CREATININE 0.9 02/04/2019    CALCIUM 8.9 02/04/2019     Lab Results   Component Value Date    ALT 15 02/04/2019    AST 13 02/04/2019    ALKPHOS 81 02/04/2019    BILITOT 0.2 02/04/2019     Lab Results   Component Value Date    MG 1.8 03/05/2017    PHOS 2.2 (L) 03/05/2017        STUDIES:  CT images and reports were personally reviewed.  The patient has thickening of her appendix with associated mild fat stranding.  There is no evidence of perforation, abscess or phlegmon.  There is no free air.      Impression       1.  Mildly dilated appendiceal base with adjacent periappendiceal inflammatory change and possible small appendicolith at the base of the appendix.  The more distal appendix appears normal in caliber, however findings are suspicious for early acute appendicitis.  Correlation with patient's physical exam and appropriate lab values advised.    2.  Postsurgical change likely related to prior fundoplication.  Small hiatal hernia.    3.  Nonobstructing left renal calculus.         ASSESSMENT & PLAN:  38 y.o. female with acute appendicitis, bipolar disorder, factor 5 Leiden deficiency pseudotumor cerebri    Acute appendicitis  - admit for observation  - plan for laparoscopic appendectomy today  - NPO with IV fluids  - IV antibiotics  - pain control  - antiemetics    Bipolar disorder  - start home dose of Wellbutrin and Lamictal    Factor 5 Leiden deficiency  - will start heparin, SCDs    Pseudotumor cerebri  - home dose of Diamox ordered

## 2019-02-04 NOTE — PLAN OF CARE
Dr Banks at bedside talking to patient & mother about findings of procedure. Patient to be discharge home today.  
Patient discharged in WC, vss, pain controlled, all instructions given, all questions answered  
Patient's mother at bedside.  
Patient's mother updated with patient's arrival to pacu & health status.  
Signed out from pacu per Dr Wadsworth. Report called to Kolton Carrillo-or. Transported to ops via stretcher,sr upx2. Mother at bedside.  
[facility]

## 2019-02-04 NOTE — ED NOTES
Pt informed of need of urine sample, states unable to provide at this time. Will inform staff if need arises.

## 2019-02-04 NOTE — OP NOTE
DATE OF PROCEDURE: 02/04/2019    PREOPERATIVE DIAGNOSIS:  Acute uncomplicated appendicitis    POSTOPERATIVE DIAGNOSIS:  Same    PROCEDURE: Laparoscopic appendectomy    SURGEON: Maikol Banks M.D    ASSISTANT:  Unique Tomlinson MD PGY II    ANESTHESIA: General endotracheal    ESTIMATED BLOOD LOSS:  Minimal    SPECIMEN: Appendix    CONDITION: Stable    COMPLICATIONS: None    FINDINGS:   1) appendix diffusely the dilated inflamed  2) no evidence of perforation, phlegmon or abscess    INDICATIONS: The patient is a 38 y.o. female who presented to the ER with ~12 hours of abd pain that localized to the RLQ. Work up including CT scan demonstrated suspeced uncomplicated appendicitis. The patient was seen and treatment options including surgery versus antibiotic therapy were discussed. After R/B/A were reviewed the patient elected to proceed with laparoscopic appendectomy. The patient demonstrated understanding of these risks and a consent form was obtained.    PROCEDURE IN DETAIL: The patient was identified in the Preoperative Unit and taken back to the Operating Room and laid supine on the operating room table. IV antibiotics were recently administered.  Patient received heparin 5000 units due to factor 5 Leiden deficiency.  General anesthesia was induced without complication. A lanza was placed to decompress the bladder. The patient was then prepped and draped in the standard sterile fashion. A tmeout was performed in accordance with hospital protocol. A 1 cm incision was sharply made infraumbilically after injecting local anesthetic.  An Optiview trocar with the 0 degree 5 mm camera was inserted to the peritoneal cavity. Insufflation was intact and appropriate pneumoperitoneum was achieved. Camera was reinserted our access site was inspected with no obvious injury. A 12 mm port was then placed in the left lower quadrant after injecting local anesthetic.  A 5 mm port was placed in the suprapubic region after injecting  local anesthetic.  The abdomen was inspected. The RLQ showed some inflammatory fluid but no evidence of perforation or phlegmon. The small bowel and colon appeared normal without fat creeping or inflammation. The appendix was identified and found to be thickened and inflamed but again no perforation. There were no significant adhesions. A window was created through the mesoappendix. The appendix was then transected at its base with a blue load stapler. The mesoappendix was transected with a white load stapler. The appendix was then placed in an endocath bag and removed from the umbilical port site. The cecum was inspected and the staple lines visualized. There is some concern for oozing along the mesoappendix staple line therefore a 0 Vicryl endo-loop was placed. 12 mm left lower quadrant site was closed with an 0 Vicryl suture using Vaughn Gamino needle Passer. The other ports were then removed and the abdomen desulfated. The incisions were then injected with additional local anesthetic. Deep dermal 3-0 Vicryls were placed interrupted fashion at the left lower quadrant incision and then the skin incisions were closed using 4-0 Vicryl suture . Dermabond was then applied. The lanza was removed, the patient was awakened from general anesthesia without complication and then returned to the Postoperative Recovery Unit in stable condition. At the end of the case, sponge, instrument and needle counts were correct x 2. I was present and scrubbed throughout the entirety of the case.    Maikol Banks Jr

## 2019-02-04 NOTE — ED NOTES
Spoke to Kolton in surgery and gave report.  Surgery consents are at bedside.  No anesthesia consents.  All belongings have been removed and given to mom at bedside.  Will continue to monitor closely.

## 2019-02-05 VITALS
RESPIRATION RATE: 17 BRPM | TEMPERATURE: 98 F | BODY MASS INDEX: 41.23 KG/M2 | DIASTOLIC BLOOD PRESSURE: 63 MMHG | HEIGHT: 60 IN | WEIGHT: 210 LBS | SYSTOLIC BLOOD PRESSURE: 104 MMHG | OXYGEN SATURATION: 97 % | HEART RATE: 81 BPM

## 2019-02-05 LAB
CARDIOLIPIN IGG SER IA-ACNC: <9.4 GPL
CARDIOLIPIN IGM SER IA-ACNC: <9.4 MPL
LA PPP-IMP: NEGATIVE

## 2019-02-05 NOTE — ANESTHESIA POSTPROCEDURE EVALUATION
Anesthesia Post Evaluation    Patient: Sarah Ulloa    Procedure(s) Performed: Procedure(s) (LRB):  APPENDECTOMY, LAPAROSCOPIC (N/A)    [facility] Anesthesia Post Op Evaluation    Visit Vitals  /63   Pulse 81   Temp 36.6 °C (97.9 °F) (Skin)   Resp 17   Ht 5' (1.524 m)   Wt 95.3 kg (210 lb)   LMP 01/16/2019   SpO2 97%   BMI 41.01 kg/m²       Pain/Marely Score: Pain Rating Prior to Med Admin: 4 (2/4/2019  4:02 PM)  Pain Rating Post Med Admin: 3 (2/4/2019  4:10 PM)  Marely Score: 10 (2/4/2019  5:11 PM)    Pt was released without a post eval note. No apparent problems were noted.

## 2019-02-05 NOTE — ANESTHESIA POSTPROCEDURE EVALUATION
Anesthesia Post Evaluation    Patient: Sarah Ulloa    Procedure(s) Performed: Procedure(s) (LRB):  APPENDECTOMY, LAPAROSCOPIC (N/A)    Final Anesthesia Type: general  Patient location during evaluation: PACU  Patient participation: Yes- Able to Participate  Level of consciousness: awake and alert  Post-procedure vital signs: reviewed and stable  Pain management: adequate  Airway patency: patent  PONV status at discharge: No PONV  Anesthetic complications: no      Cardiovascular status: blood pressure returned to baseline  Respiratory status: unassisted  Hydration status: euvolemic  Follow-up not needed.        Visit Vitals  /63   Pulse 81   Temp 36.6 °C (97.9 °F) (Skin)   Resp 17   Ht 5' (1.524 m)   Wt 95.3 kg (210 lb)   LMP 01/16/2019   SpO2 97%   BMI 41.01 kg/m²       Pain/Marely Score: Pain Rating Prior to Med Admin: 4 (2/4/2019  4:02 PM)  Pain Rating Post Med Admin: 3 (2/4/2019  4:10 PM)  Marely Score: 10 (2/4/2019  5:11 PM)

## 2019-02-05 NOTE — ANESTHESIA POSTPROCEDURE EVALUATION
Anesthesia Post Evaluation    Patient: Sarah Ulloa    Procedure(s) Performed: Procedure(s) (LRB):  APPENDECTOMY, LAPAROSCOPIC (N/A)        Anesthetic complications: no              Visit Vitals  /63   Pulse 81   Temp 36.6 °C (97.9 °F) (Skin)   Resp 17   Ht 5' (1.524 m)   Wt 95.3 kg (210 lb)   LMP 01/16/2019   SpO2 97%   BMI 41.01 kg/m²       Pain/Marely Score: Pain Rating Prior to Med Admin: 4 (2/4/2019  4:02 PM)  Pain Rating Post Med Admin: 3 (2/4/2019  4:10 PM)  Marely Score: 10 (2/4/2019  5:11 PM)

## 2019-02-07 LAB
B2 GLYCOPROT1 IGA SER QL: <9 SAU
B2 GLYCOPROT1 IGG SER QL: <9 SGU
B2 GLYCOPROT1 IGM SER QL: <9 SMU

## 2019-02-08 ENCOUNTER — PATIENT MESSAGE (OUTPATIENT)
Dept: HEMATOLOGY/ONCOLOGY | Facility: CLINIC | Age: 39
End: 2019-02-08

## 2019-02-21 ENCOUNTER — TELEPHONE (OUTPATIENT)
Dept: SURGERY | Facility: CLINIC | Age: 39
End: 2019-02-21

## 2019-02-21 NOTE — TELEPHONE ENCOUNTER
2/21/19  2:11pm  Attempted to reach patient to reschedule appointment. No answer. Message left via voicemail.

## 2019-03-08 ENCOUNTER — PATIENT MESSAGE (OUTPATIENT)
Dept: HEMATOLOGY/ONCOLOGY | Facility: CLINIC | Age: 39
End: 2019-03-08

## 2019-03-11 PROBLEM — R68.83 CHILLS (WITHOUT FEVER): Status: RESOLVED | Noted: 2018-03-05 | Resolved: 2019-03-11

## 2019-03-26 ENCOUNTER — PATIENT OUTREACH (OUTPATIENT)
Dept: ADMINISTRATIVE | Facility: HOSPITAL | Age: 39
End: 2019-03-26

## 2019-04-03 ENCOUNTER — OFFICE VISIT (OUTPATIENT)
Dept: INTERNAL MEDICINE | Facility: CLINIC | Age: 39
End: 2019-04-03
Payer: COMMERCIAL

## 2019-04-03 VITALS
OXYGEN SATURATION: 98 % | HEART RATE: 93 BPM | HEIGHT: 60 IN | WEIGHT: 220.38 LBS | BODY MASS INDEX: 43.27 KG/M2 | SYSTOLIC BLOOD PRESSURE: 112 MMHG | DIASTOLIC BLOOD PRESSURE: 62 MMHG

## 2019-04-03 DIAGNOSIS — M79.674 GREAT TOE PAIN, RIGHT: ICD-10-CM

## 2019-04-03 DIAGNOSIS — Z13.6 ENCOUNTER FOR LIPID SCREENING FOR CARDIOVASCULAR DISEASE: ICD-10-CM

## 2019-04-03 DIAGNOSIS — R73.03 PREDIABETES: ICD-10-CM

## 2019-04-03 DIAGNOSIS — Z13.220 ENCOUNTER FOR LIPID SCREENING FOR CARDIOVASCULAR DISEASE: ICD-10-CM

## 2019-04-03 DIAGNOSIS — Z98.890 S/P NISSEN FUNDOPLICATION (WITHOUT GASTROSTOMY TUBE) PROCEDURE: ICD-10-CM

## 2019-04-03 DIAGNOSIS — Z00.00 ANNUAL PHYSICAL EXAM: Primary | ICD-10-CM

## 2019-04-03 DIAGNOSIS — E55.9 VITAMIN D DEFICIENCY: ICD-10-CM

## 2019-04-03 DIAGNOSIS — E04.1 THYROID NODULE: ICD-10-CM

## 2019-04-03 PROBLEM — K35.80 ACUTE APPENDICITIS: Status: RESOLVED | Noted: 2019-02-04 | Resolved: 2019-04-03

## 2019-04-03 PROCEDURE — 99999 PR PBB SHADOW E&M-EST. PATIENT-LVL IV: CPT | Mod: PBBFAC,,, | Performed by: FAMILY MEDICINE

## 2019-04-03 PROCEDURE — 99395 PR PREVENTIVE VISIT,EST,18-39: ICD-10-PCS | Mod: S$GLB,,, | Performed by: FAMILY MEDICINE

## 2019-04-03 PROCEDURE — 99395 PREV VISIT EST AGE 18-39: CPT | Mod: S$GLB,,, | Performed by: FAMILY MEDICINE

## 2019-04-03 PROCEDURE — 99999 PR PBB SHADOW E&M-EST. PATIENT-LVL IV: ICD-10-PCS | Mod: PBBFAC,,, | Performed by: FAMILY MEDICINE

## 2019-04-03 RX ORDER — DICLOFENAC SODIUM 10 MG/G
2 GEL TOPICAL 4 TIMES DAILY
Qty: 100 G | Refills: 0 | Status: SHIPPED | OUTPATIENT
Start: 2019-04-03 | End: 2020-06-08 | Stop reason: SDUPTHER

## 2019-04-03 NOTE — PROGRESS NOTES
Subjective:       Patient ID: Sarah Ulloa is a 38 y.o. female.    Chief Complaint: Annual Exam    HPI  37 y/o female former smoker (quit 2012) with migraines, hx of GERD s/p Nissen in 3/2017, hx of thyroid nodules, Factor V Leiden def (dx in 2006), CLBP, Bipolar 1, Pseudotumor Cerebri is here for annual exam.     She is doing well, her R big toe started bothering her a week ago, denies fall or injury, pain is worse with walking and better with rest. Pain is constant sharp is a 5/10, worse at the end of the day, she has tried Aleve which did not help. She had an emergency appendectomy 2/2019. She did well post op.  She denies f/n/v/d/constipation/cp/sob/urinary sx. She is eating healthy, she is not doing dedicated exercise, active at work.  She is sleeping well.  Weight up 8 pounds.    Migraines: well controlled on Diamox, no Fioricet use, rare Imitrex   Hx of thyroid nodules:  Bipolar 1: followed by Dr. Mata, doing well on lamotrigine, wellbutrin, trintellix  CLBP: she is off tramadol, takes zanaflex about 1 pill every 2-3 months, followed by Dr. Heredia, getting periodic neck injections  GYN: [facility], on [address]   Eye exam utd  Dental utd  Vaccines: utd    Review of Systems   Constitutional: Negative for activity change, appetite change, fatigue and fever.   Respiratory: Negative for cough and shortness of breath.    Cardiovascular: Negative for chest pain, palpitations and leg swelling.   Gastrointestinal: Negative for constipation, diarrhea, nausea and vomiting.   Genitourinary: Negative for difficulty urinating and dysuria.   Skin: Negative for rash.   Neurological: Negative for dizziness and light-headedness.   Psychiatric/Behavioral: Negative for sleep disturbance.       Objective:      /62 (BP Location: Left arm, Patient Position: Sitting, BP Method: X-Large (Manual))   Pulse 93   Ht 5' (1.524 m)   Wt 100 kg (220 lb 5.6 oz)   LMP 12/24/2018   SpO2  98%   BMI 43.03 kg/m²   Physical Exam   Constitutional: She appears well-developed and well-nourished.   HENT:   Head: Normocephalic and atraumatic.   Mouth/Throat: No oropharyngeal exudate.   Neck: Normal range of motion. Neck supple. No thyromegaly present.   Cardiovascular: Normal rate, regular rhythm and normal heart sounds.   Pulmonary/Chest: Effort normal and breath sounds normal. No respiratory distress.   Abdominal: Soft. Bowel sounds are normal. She exhibits no distension. There is no tenderness.   Musculoskeletal: She exhibits no edema.   Lymphadenopathy:     She has no cervical adenopathy.   Neurological: She is alert.   Skin: Skin is warm and dry.   Psychiatric: She has a normal mood and affect.   Nursing note and vitals reviewed.      Assessment:       1. Annual physical exam    2. Thyroid nodule    3. Vitamin D deficiency    4. S/P Nissen fundoplication (without gastrostomy tube) procedure    5. Prediabetes    6. Encounter for lipid screening for cardiovascular disease    7. Great toe pain, right    8. BMI 40.0-44.9, adult        Plan:   Sarah was seen today for annual exam.    Diagnoses and all orders for this visit:    Annual physical exam  -     Iron and TIBC; Future  -     Ferritin; Future  -     Vitamin B12; Future  -     Hemoglobin A1c; Future  -     Lipid panel; Future  -     TSH; Future  -     Vitamin D; Future    Thyroid nodule  -     US Soft Tissue Head Neck Thyroid; Future  -     TSH; Future    Vitamin D deficiency  -     Vitamin D; Future    S/P Nissen fundoplication (without gastrostomy tube) procedure    Prediabetes  -     Hemoglobin A1c; Future  -     [facility]  -     [facility] TANNER ASSIGN QUESTIONNAIRE SERIES ([employer])  -     [facility] ([employer])    Encounter for lipid screening for cardiovascular disease  -     Lipid panel; Future    Great toe pain, right  -     Ambulatory referral to Podiatry  -     diclofenac sodium  (VOLTAREN) 1 % Gel; Apply 2 g topically 4 (four) times daily.    BMI 40.0-44.9, adult  -     Ambulatory Referral to [facility] ([employer])  -     [facility] MAGNOLIA ASSIGN QUESTIONNAIRE SERIES ([employer])  -     [place] Patient Entered [facility] ([facility])    albertoed nichole

## 2019-04-13 ENCOUNTER — PATIENT MESSAGE (OUTPATIENT)
Dept: INTERNAL MEDICINE | Facility: CLINIC | Age: 39
End: 2019-04-13

## 2019-04-17 ENCOUNTER — TELEPHONE (OUTPATIENT)
Dept: INTERNAL MEDICINE | Facility: CLINIC | Age: 39
End: 2019-04-17

## 2019-04-17 NOTE — TELEPHONE ENCOUNTER
Patient referred by Dr. Pryor for Health coaching (healthy lifestyle changes).  Called patient and gave an explanation about Health Coaching program and invited participation.   Patient states she would like to participate.  She will call back walking into a meeting at work.  Gave direct contact number to call if she has any questions [phone].   Lili Thrasher RN  Health

## 2019-04-17 NOTE — TELEPHONE ENCOUNTER
----- Message from Nini Pryor MD sent at 4/3/2019  3:30 PM CDT -----  Please help with healthy lifestyle goals.

## 2019-04-20 ENCOUNTER — LAB VISIT (OUTPATIENT)
Dept: LAB | Facility: HOSPITAL | Age: 39
End: 2019-04-20
Attending: FAMILY MEDICINE
Payer: COMMERCIAL

## 2019-04-20 DIAGNOSIS — Z13.6 ENCOUNTER FOR LIPID SCREENING FOR CARDIOVASCULAR DISEASE: ICD-10-CM

## 2019-04-20 DIAGNOSIS — E04.1 THYROID NODULE: ICD-10-CM

## 2019-04-20 DIAGNOSIS — R73.03 PREDIABETES: ICD-10-CM

## 2019-04-20 DIAGNOSIS — Z13.220 ENCOUNTER FOR LIPID SCREENING FOR CARDIOVASCULAR DISEASE: ICD-10-CM

## 2019-04-20 DIAGNOSIS — E55.9 VITAMIN D DEFICIENCY: ICD-10-CM

## 2019-04-20 DIAGNOSIS — Z00.00 ANNUAL PHYSICAL EXAM: ICD-10-CM

## 2019-04-20 LAB
25(OH)D3+25(OH)D2 SERPL-MCNC: 21 NG/ML (ref 30–96)
CHOLEST SERPL-MCNC: 192 MG/DL (ref 120–199)
CHOLEST/HDLC SERPL: 4 {RATIO} (ref 2–5)
ESTIMATED AVG GLUCOSE: 114 MG/DL (ref 68–131)
FERRITIN SERPL-MCNC: 24 NG/ML (ref 20–300)
HBA1C MFR BLD HPLC: 5.6 % (ref 4–5.6)
HDLC SERPL-MCNC: 48 MG/DL (ref 40–75)
HDLC SERPL: 25 % (ref 20–50)
IRON SERPL-MCNC: 54 UG/DL (ref 30–160)
LDLC SERPL CALC-MCNC: 126.6 MG/DL (ref 63–159)
NONHDLC SERPL-MCNC: 144 MG/DL
SATURATED IRON: 13 % (ref 20–50)
TOTAL IRON BINDING CAPACITY: 422 UG/DL (ref 250–450)
TRANSFERRIN SERPL-MCNC: 285 MG/DL (ref 200–375)
TRIGL SERPL-MCNC: 87 MG/DL (ref 30–150)
TSH SERPL DL<=0.005 MIU/L-ACNC: 1.44 UIU/ML (ref 0.4–4)
VIT B12 SERPL-MCNC: 358 PG/ML (ref 210–950)

## 2019-04-20 PROCEDURE — 83540 ASSAY OF IRON: CPT

## 2019-04-20 PROCEDURE — 82728 ASSAY OF FERRITIN: CPT

## 2019-04-20 PROCEDURE — 83036 HEMOGLOBIN GLYCOSYLATED A1C: CPT

## 2019-04-20 PROCEDURE — 82607 VITAMIN B-12: CPT

## 2019-04-20 PROCEDURE — 80061 LIPID PANEL: CPT

## 2019-04-20 PROCEDURE — 36415 COLL VENOUS BLD VENIPUNCTURE: CPT

## 2019-04-20 PROCEDURE — 82306 VITAMIN D 25 HYDROXY: CPT

## 2019-04-20 PROCEDURE — 84443 ASSAY THYROID STIM HORMONE: CPT

## 2019-04-22 ENCOUNTER — PATIENT MESSAGE (OUTPATIENT)
Dept: INTERNAL MEDICINE | Facility: CLINIC | Age: 39
End: 2019-04-22

## 2019-04-24 ENCOUNTER — TELEPHONE (OUTPATIENT)
Dept: INTERNAL MEDICINE | Facility: CLINIC | Age: 39
End: 2019-04-24

## 2019-04-24 NOTE — TELEPHONE ENCOUNTER
I sent her my chart message.  Please offer her an appointment to further discuss her fatigue and help her with arranging her neurology appointment/follow-up

## 2019-04-25 NOTE — TELEPHONE ENCOUNTER
Pt is schedule with Dr. Pryor on 5/1/19 to discuss fatigue. Pt refused appt with neurology until she talks with Dr. Pryor.

## 2019-05-02 ENCOUNTER — PATIENT MESSAGE (OUTPATIENT)
Dept: NEUROLOGY | Facility: CLINIC | Age: 39
End: 2019-05-02

## 2019-06-16 ENCOUNTER — PATIENT MESSAGE (OUTPATIENT)
Dept: INTERNAL MEDICINE | Facility: CLINIC | Age: 39
End: 2019-06-16

## 2019-08-26 NOTE — PROGRESS NOTES
Subjective:       Patient ID: Sarah Ulloa is a 36 y.o. female.    Chief Complaint: Follow-up (sign consents )    HPI GERD and BMI 44.  She says the ph probe was horrible due to getting off her reflux medications.  Review of Systems   Constitutional: Negative for fever.   Respiratory: Negative for chest tightness and shortness of breath.    Cardiovascular: Negative for chest pain.   Gastrointestinal: Negative for constipation, diarrhea, nausea and vomiting.   Genitourinary: Negative for difficulty urinating and dysuria.   Hematological: Does not bruise/bleed easily.       Objective:    egd, ph and motility reviewed  Physical Exam   Constitutional: She is oriented to person, place, and time. She appears well-developed and well-nourished.   Cardiovascular: Normal rate, regular rhythm and normal heart sounds.    Pulmonary/Chest: Effort normal and breath sounds normal.   Abdominal: Soft. Bowel sounds are normal. There is no tenderness.   Neurological: She is alert and oriented to person, place, and time.   Skin: Skin is warm and dry.   Psychiatric: She has a normal mood and affect. Her behavior is normal. Judgment and thought content normal.   Vitals reviewed.      Assessment:       1. Gastroesophageal reflux disease without esophagitis    2. BMI 45.0-49.9, adult        Plan:       Lap hh with Toupet fundoplication      73 yo M aaox4 presents ED via EMS from home c/o rectal bleeding, Hx kidney transplant. Pt states that he had kidney transplant two years ago, his Dr told him that the kidney is failing, he come from [place] to [place]  for a family emergency  , one week ago to. Pt states that 10 days ago started with swelling (+3)bilateral lower extremities. C/o SOB, denies CP, abdominal pain, fever, chills, N/V. He had an episode of diarrhea , his sister saw a red bright blood on the toilet and the toilet paper. denies  issues, no respiratory distress. pt has a fistula on left arm,  PT safety maintained, and  call bell within reach and bed in the lowest position.

## 2019-08-28 ENCOUNTER — PATIENT MESSAGE (OUTPATIENT)
Dept: INTERNAL MEDICINE | Facility: CLINIC | Age: 39
End: 2019-08-28

## 2019-08-29 ENCOUNTER — OFFICE VISIT (OUTPATIENT)
Dept: INTERNAL MEDICINE | Facility: CLINIC | Age: 39
End: 2019-08-29
Payer: COMMERCIAL

## 2019-08-29 VITALS
OXYGEN SATURATION: 98 % | SYSTOLIC BLOOD PRESSURE: 118 MMHG | WEIGHT: 211.63 LBS | HEIGHT: 60 IN | DIASTOLIC BLOOD PRESSURE: 80 MMHG | HEART RATE: 89 BPM | TEMPERATURE: 99 F | BODY MASS INDEX: 41.55 KG/M2

## 2019-08-29 DIAGNOSIS — J22 LRTI (LOWER RESPIRATORY TRACT INFECTION): Primary | ICD-10-CM

## 2019-08-29 PROCEDURE — 3008F PR BODY MASS INDEX (BMI) DOCUMENTED: ICD-10-PCS | Mod: CPTII,S$GLB,, | Performed by: FAMILY MEDICINE

## 2019-08-29 PROCEDURE — 99999 PR PBB SHADOW E&M-EST. PATIENT-LVL IV: CPT | Mod: PBBFAC,,, | Performed by: FAMILY MEDICINE

## 2019-08-29 PROCEDURE — 99213 PR OFFICE/OUTPT VISIT, EST, LEVL III, 20-29 MIN: ICD-10-PCS | Mod: S$GLB,,, | Performed by: FAMILY MEDICINE

## 2019-08-29 PROCEDURE — 99999 PR PBB SHADOW E&M-EST. PATIENT-LVL IV: ICD-10-PCS | Mod: PBBFAC,,, | Performed by: FAMILY MEDICINE

## 2019-08-29 PROCEDURE — 3008F BODY MASS INDEX DOCD: CPT | Mod: CPTII,S$GLB,, | Performed by: FAMILY MEDICINE

## 2019-08-29 PROCEDURE — 99213 OFFICE O/P EST LOW 20 MIN: CPT | Mod: S$GLB,,, | Performed by: FAMILY MEDICINE

## 2019-08-29 RX ORDER — AZITHROMYCIN 250 MG/1
TABLET, FILM COATED ORAL
Qty: 6 TABLET | Refills: 0 | Status: SHIPPED | OUTPATIENT
Start: 2019-08-29 | End: 2019-09-03

## 2019-08-29 RX ORDER — BENZONATATE 200 MG/1
200 CAPSULE ORAL 3 TIMES DAILY PRN
Qty: 30 CAPSULE | Refills: 1 | Status: SHIPPED | OUTPATIENT
Start: 2019-08-29 | End: 2019-09-08

## 2019-08-29 RX ORDER — PROMETHAZINE HYDROCHLORIDE AND DEXTROMETHORPHAN HYDROBROMIDE 6.25; 15 MG/5ML; MG/5ML
5 SYRUP ORAL EVERY 6 HOURS PRN
Qty: 180 ML | Refills: 1 | Status: SHIPPED | OUTPATIENT
Start: 2019-08-29 | End: 2019-09-08

## 2019-08-29 NOTE — PROGRESS NOTES
"[facility]  [facility] - Family Medicine/ Internal Medicine  Clinic Note      Subjective:       Patient ID: Sarah Ulloa is a 39 y.o. female.    Chief Complaint: Chest Pain (pt chest is burning); Cough; Nasal Congestion; Fatigue; Dizziness; and Shortness of Breath    Patient is here today for a sick visit.  She has been feeling ill for the last 12 days, notes she is not getting any better.  She denies any significant upper respiratory symptoms, but notes persistent chills, malaise, fatigue, mild myalgias, and progressive cough.  She notes onset of diffuse "burning" chest discomfort, purulent sputum production, and mild dyspnea.  She has had no wheezing or cough paroxysm, but notes some post-tussive gagging.  No vomiting or diarrhea.  No headache or sinus pressure.    Cough   This is a new problem. The cough is productive of purulent sputum. Associated symptoms include chest pain, chills, myalgias and shortness of breath. Pertinent negatives include no headaches, hemoptysis, nasal congestion, rhinorrhea, sore throat or wheezing. Risk factors: no ill contacts.     Review of Systems   Constitutional: Positive for chills and fatigue.   HENT: Negative for congestion, rhinorrhea, sinus pressure, sinus pain and sore throat.    Respiratory: Positive for cough and shortness of breath. Negative for hemoptysis, chest tightness and wheezing.    Cardiovascular: Positive for chest pain.   Gastrointestinal: Negative for diarrhea, nausea and vomiting.   Musculoskeletal: Positive for myalgias.   Neurological: Positive for dizziness. Negative for headaches.       Objective:      /80 (BP Location: Left arm, Patient Position: Sitting, BP Method: Large (Manual))   Pulse 89   Temp 99 °F (37.2 °C)   Ht 5' (1.524 m)   Wt 96 kg (211 lb 10.3 oz)   LMP 05/23/2019   SpO2 98%   BMI 41.33 kg/m²   Physical Exam   Constitutional: She is oriented to person, place, and time. She appears well-developed and " well-nourished. No distress.   HENT:   Head: Normocephalic and atraumatic.   Right Ear: Tympanic membrane and ear canal normal. Tympanic membrane is not erythematous and not retracted. No middle ear effusion.   Left Ear: Tympanic membrane and ear canal normal. Tympanic membrane is not erythematous and not retracted.  No middle ear effusion.   Nose: No mucosal edema or rhinorrhea. Right sinus exhibits no maxillary sinus tenderness and no frontal sinus tenderness. Left sinus exhibits no maxillary sinus tenderness and no frontal sinus tenderness.   Mouth/Throat: Oropharynx is clear and moist and mucous membranes are normal. No posterior oropharyngeal edema or posterior oropharyngeal erythema.   Neck: Normal range of motion.   Cardiovascular: Normal rate and regular rhythm.   No murmur heard.  Pulmonary/Chest: Effort normal and breath sounds normal. No tachypnea. No respiratory distress. She has no wheezes. She has no rhonchi. She has no rales.   Abdominal: Soft. Bowel sounds are normal. She exhibits no distension. There is no tenderness.   Musculoskeletal: Normal range of motion.   Lymphadenopathy:     She has no cervical adenopathy.   Neurological: She is alert and oriented to person, place, and time.   Skin: Skin is warm and dry. No rash noted.   Psychiatric: She has a normal mood and affect.   Vitals reviewed.      Assessment:       1. LRTI (lower respiratory tract infection)        Plan:     1. LRTI (lower respiratory tract infection)  - exam findings reviewed, generally reassuring and lung exam is clear today  - - evaluation options reviewed, given cough and potential fever/chest pain, will order chest XR and advised it would be reasonable to defer imaging for another 1-2 days to see how she may respond to antibiotics  - - - X-Ray Chest PA And Lateral; Future  - supportive care measures reviewed, have recommended increased oral fluids and judicious use of OTC cough-cold remedies, handout provided   - treatment  options reviewed, will start empiric course of antibiotic and provide with trial of safe cough suppressant for additional symptom relief, dosing instructions and potential side effects reviewed  - - azithromycin (Z-ZAIDA) 250 MG tablet; Take 2 tablets by mouth on day 1; Take 1 tablet by mouth on days 2-5  Dispense: 6 tablet; Refill: 0  - - benzonatate (TESSALON) 200 MG capsule; Take 1 capsule (200 mg total) by mouth 3 (three) times daily as needed.  Dispense: 30 capsule; Refill: 1  - - promethazine-dextromethorphan (PROMETHAZINE-DM) 6.25-15 mg/5 mL Syrp; Take 5 mLs by mouth every 6 (six) hours as needed (cough).  Dispense: 180 mL; Refill: 1  - questions answered, warning signs reviewed, patient is comfortable with this plan and was encouraged to call the office for any concerns or worsening of condition     - Follow up next week if needed, for follow-up cough.     Donis Arce MD  8/29/2019          Medication List with Changes/Refills   New Medications    AZITHROMYCIN (Z-ZAIDA) 250 MG TABLET    Take 2 tablets by mouth on day 1; Take 1 tablet by mouth on days 2-5    BENZONATATE (TESSALON) 200 MG CAPSULE    Take 1 capsule (200 mg total) by mouth 3 (three) times daily as needed.    PROMETHAZINE-DEXTROMETHORPHAN (PROMETHAZINE-DM) 6.25-15 MG/5 ML SYRP    Take 5 mLs by mouth every 6 (six) hours as needed (cough).   Current Medications    ACETAZOLAMIDE (DIAMOX) 250 MG TABLET    TAKE ONE TABLET BY MOUTH THREE TIMES A DAY    BUPROPION (WELLBUTRIN SR) 200 MG TBSR    200 mg 2 (two) times daily.    DICLOFENAC SODIUM (VOLTAREN) 1 % GEL    Apply 2 g topically 4 (four) times daily.    LAMOTRIGINE (LAMICTAL) 150 MG TAB    150 mg 2 (two) times daily.    NORETHINDRONE (MICRONOR) 0.35 MG TABLET    Take by mouth once daily.     SUMATRIPTAN (IMITREX) 100 MG TABLET    Take 1 tablet (100 mg total) by mouth every 2 (two) hours as needed for Migraine (Max 2 tabs per 24 hours and 12 tablets per week).    TRINTELLIX 10 MG TAB    10 mg once  daily.

## 2019-09-08 ENCOUNTER — PATIENT MESSAGE (OUTPATIENT)
Dept: INTERNAL MEDICINE | Facility: CLINIC | Age: 39
End: 2019-09-08

## 2019-09-09 NOTE — TELEPHONE ENCOUNTER
Can you please call the patient and see if she would like to come in tomorrow for a follow-up sick visit?  Thanks.

## 2019-11-22 NOTE — TELEPHONE ENCOUNTER
"Pt states that she is having off an on back pain and abd. pain. Pt states that she is getting better but is still having pain off and on. Pt states that the pain is not bad enough to go to the ER. Pt requested to be seen for back pain. Schedule pt for urgent care 4/17/18. Pt called back later and canceled appt. Pt informed the patient access rep: "Condition Improved (Feeling better, think pain might be due to my period starting soon. )".   " NEUROLOGy FOLLOW-UP        Patient: Betsey Simpson Date of Service: 2019   : 1987 MRN: [id number]     SUBJECTIVE:   HISTORY SINCE LAST VISIT:  Betsey Simpson is a 32 year old female who presents today for f/u for migraines.  They now seem to be occurring once every 3-4 weeks.  However, migraines now seem more severe.  Taking propranolol 20 mg BID; no side effects.  Sumatriptan 25 mg is effective for severe migraines, but makes her nauseous.  Uses Excedrin Migraine also, but is not as effective.  Uses Zofran for nausea.  Pt admits that she tries Excedrin at onset of migraines before taking sumatriptan.  Trying to adjust her diet; decreasing ETOH intake, but not sure if this helps. Sleeps 7 hrs/night.    Last visit, 2019:  \"Tatiana Leggett is a 32 year old female who presents today for evaluation for migraines.  These started when she was ~15-16 years old; they start from either side of her neck and then radiate up to her temple and ear area with throbbing, nausea and vomiting.  Sensitive to light but not noise; not associated any focal weakness or numbness.  She has an aura in which she starts to become more sensitive to light in her right eye, but no distortions in vision.  Frequency was about 4 times a week when she was a teenager; suration may be as long as 6 hours.  When starting birth control, it seemed to improve; around December last year she stopped this.  Patient also has been bought driving long distances to work, which may also cause stress.  Since then, for the past 6-8 months, it seems that it now occurs about twice a week.  She may have a debilitating headache, in which she has to take the day off, about once every few months.  When taking sumatriptan 25 mg tablets early enough, she can usually prevent a second headache from happening; it helps \"80% of the time\".  She also may switch this with Excedrin Migraine sometimes.  She thinks that she tried amitriptyline when she was back  in college and it may have helped with the frequency of the headaches.  Caffeine also seems to help as well.  She has 1 cup of coffee a day.    Her last MRI of the brain was when she was a teenager and it was unremarkable.  Her father has a history of cluster headaches.  Of note, she has had elevated blood pressures over the past few months when she has visits the doctor's office, usually in the 140s-160s systolic range.\"    PAST MEDICAL HISTORY:  Past Medical History:   Diagnosis Date   • Hypertension 11/2018   • Migraines 2005       MEDICATIONS:  Current Outpatient Medications   Medication Sig   • ondansetron (ZOFRAN) 8 MG tablet Take 1 tablet by mouth every 8 hours as needed for Nausea. Can also try half tab(4 mg)dose if better tolerated   • eletriptan (RELPAX) 40 MG tablet Take 1 at onset. Repeat every 2 hours as needed. Take half tab(20 mg) if better tolerated.  Max dose 200 mg/24 hrs   • propranolol (INDERAL) 20 MG tablet Take 1 tablet by mouth 2 times daily. If too strong, try 1 tab daily for 1 week, then increase back to twice a day   • SUMAtriptan (IMITREX) 25 MG tablet TAKE 1 TABLET FOR MIGRAINE RELIEF. MAY REPEAT EVERY 2 HOURS. MAX 200MG/DAY.   • aspirin-acetaminophen-caffeine (EXCEDRIN MIGRAINE) 250-250-65 MG per tablet Take 1 tablet by mouth as needed.     No current facility-administered medications for this visit.        ALLERGIES:  ALLERGIES:  No Known Allergies        OBJECTIVE:     Visit Vitals  /70 (BP Location: LUE - Left upper extremity, Patient Position: Sitting, Cuff Size: Regular)   Pulse 56   Temp 96.8 °F (36 °C) (Tympanic)   Resp 14   Ht 5' 6\" (1.676 m)   Wt 78.5 kg (173 lb)   LMP 01/23/2019 (Approximate)   BMI 27.92 kg/m²       Neurological exam:  Awake, alert, oriented in all aspects. No cognitive deficits.  Both pupils equal, round, reactive to light.  Bilateral extraocular movements intact.  Visual fields full bilaterally.  Smile symmetrical, tongue midline.  Grossly 5/5 in all  extremities.  No sensory deficits.  Coordination, finger-to-nose intact bilaterally  Gait normal, nonataxic.    DIAGNOSTIC STUDIES:   LAB RESULTS:    Lab Results Reviewed, Diagnostic Data Reviewed and   No visits with results within 6 Month(s) from this visit.   Latest known visit with results is:   Office Visit on 02/08/2019   Component Date Value Ref Range Status   • URINE PREGNANCY,QUAL 02/08/2019 neg   Final   • SOURCE 02/08/2019 URINE   Final   • Chlamydia Trachomatis by Nucleic A* 02/08/2019 NEGATIVE  NEGATIVE Final   • Neisseria Gonorrhoeae by Nucleic A* 02/08/2019 NEGATIVE  NEGATIVE Final        Assessment AND PLAN:   This is a 32 year old year-old female who presents with migraines which are now less frequent.  Propranolol well tolerated. Migraines seem more severe in intensity when they occur; explained to pt that usually sumatriptan should be taken at onset of migraine and that if it is taken later when headache has progressed that it may not be as effective.    1.  Continue propranolol 20 mg BID for migraine prophylaxis.  2.  Pt will try to take sumatriptan earlier when having migraines.  Will also try pt on eletriptan 40 mg tabs to see if this is more effective with less nausea.  Also will prescribe Zofran to help w/ nausea if needed.  3. Blood pressure better controlled this time; continue propranolol for hypertension.      Migraine with aura and without status migrainosus, not intractable      Return in about 6 months (around 5/22/2020).    Instructions provided as documented in the AVS.    The patient indicated understanding of the diagnosis and agreed with the plan of care.

## 2019-12-04 RX ORDER — ACETAZOLAMIDE 250 MG/1
250 TABLET ORAL 3 TIMES DAILY
Qty: 90 TABLET | Refills: 10 | Status: SHIPPED | OUTPATIENT
Start: 2019-12-04 | End: 2021-01-21

## 2019-12-13 ENCOUNTER — TELEPHONE (OUTPATIENT)
Dept: SLEEP MEDICINE | Facility: CLINIC | Age: 39
End: 2019-12-13

## 2020-01-02 ENCOUNTER — PATIENT OUTREACH (OUTPATIENT)
Dept: ADMINISTRATIVE | Facility: OTHER | Age: 40
End: 2020-01-02

## 2020-02-04 ENCOUNTER — TELEPHONE (OUTPATIENT)
Dept: SLEEP MEDICINE | Facility: CLINIC | Age: 40
End: 2020-02-04

## 2020-03-11 ENCOUNTER — PATIENT MESSAGE (OUTPATIENT)
Dept: INTERNAL MEDICINE | Facility: CLINIC | Age: 40
End: 2020-03-11

## 2020-03-11 NOTE — TELEPHONE ENCOUNTER
Yes, please recommend a visit before her surgery to be evaluated and see if we'll need to start any new medication.  Thanks.

## 2020-03-12 ENCOUNTER — OFFICE VISIT (OUTPATIENT)
Dept: INTERNAL MEDICINE | Facility: CLINIC | Age: 40
End: 2020-03-12
Payer: COMMERCIAL

## 2020-03-12 VITALS
SYSTOLIC BLOOD PRESSURE: 118 MMHG | HEART RATE: 92 BPM | HEIGHT: 60 IN | OXYGEN SATURATION: 99 % | BODY MASS INDEX: 42.76 KG/M2 | WEIGHT: 217.81 LBS | DIASTOLIC BLOOD PRESSURE: 62 MMHG | TEMPERATURE: 99 F

## 2020-03-12 DIAGNOSIS — R05.9 COUGH: ICD-10-CM

## 2020-03-12 DIAGNOSIS — M79.10 MYALGIA: Primary | ICD-10-CM

## 2020-03-12 LAB
INFLUENZA A, MOLECULAR: NEGATIVE
INFLUENZA B, MOLECULAR: NEGATIVE
SPECIMEN SOURCE: NORMAL

## 2020-03-12 PROCEDURE — 99213 PR OFFICE/OUTPT VISIT, EST, LEVL III, 20-29 MIN: ICD-10-PCS | Mod: S$GLB,,, | Performed by: STUDENT IN AN ORGANIZED HEALTH CARE EDUCATION/TRAINING PROGRAM

## 2020-03-12 PROCEDURE — 99999 PR PBB SHADOW E&M-EST. PATIENT-LVL III: ICD-10-PCS | Mod: PBBFAC,,, | Performed by: STUDENT IN AN ORGANIZED HEALTH CARE EDUCATION/TRAINING PROGRAM

## 2020-03-12 PROCEDURE — 99999 PR PBB SHADOW E&M-EST. PATIENT-LVL III: CPT | Mod: PBBFAC,,, | Performed by: STUDENT IN AN ORGANIZED HEALTH CARE EDUCATION/TRAINING PROGRAM

## 2020-03-12 PROCEDURE — 3008F BODY MASS INDEX DOCD: CPT | Mod: CPTII,S$GLB,, | Performed by: STUDENT IN AN ORGANIZED HEALTH CARE EDUCATION/TRAINING PROGRAM

## 2020-03-12 PROCEDURE — 99213 OFFICE O/P EST LOW 20 MIN: CPT | Mod: S$GLB,,, | Performed by: STUDENT IN AN ORGANIZED HEALTH CARE EDUCATION/TRAINING PROGRAM

## 2020-03-12 PROCEDURE — 3008F PR BODY MASS INDEX (BMI) DOCUMENTED: ICD-10-PCS | Mod: CPTII,S$GLB,, | Performed by: STUDENT IN AN ORGANIZED HEALTH CARE EDUCATION/TRAINING PROGRAM

## 2020-03-12 PROCEDURE — 87502 INFLUENZA DNA AMP PROBE: CPT

## 2020-03-12 NOTE — PROGRESS NOTES
Subjective     Chief Complaint: Cough and fatigue     History of Present Illness:  Ms. Sarah Ulloa is a 39 y.o. female presents today because she has been having cough for 3 days , and fatigue and body aches that started all the same time. Patient did not get her flu shot and lives with a daughter who goes to school and both didn't get the flu . Body aches started today everything else was 3 days ago. She is hydrating well but is worried since she has a tubal ligation scheduled for a couple of weeks  .     Review of Systems   Constitutional: Positive for malaise/fatigue. Negative for chills, diaphoresis, fever and weight loss.   HENT: Negative for congestion, hearing loss and sore throat.    Eyes: Negative for blurred vision, double vision and photophobia.   Respiratory: Positive for cough. Negative for wheezing.    Cardiovascular: Negative for chest pain, palpitations, orthopnea and PND.   Gastrointestinal: Negative for abdominal pain, constipation, diarrhea, nausea and vomiting.   Genitourinary: Negative for frequency, hematuria and urgency.   Musculoskeletal: Negative for back pain, joint pain, myalgias and neck pain.   Neurological: Negative for dizziness, sensory change, seizures, loss of consciousness, weakness and headaches.   Psychiatric/Behavioral: Negative for depression. The patient is not nervous/anxious.        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 intervertebral disc     Morbid obesity with BMI of 45.0-49.9, adult     Thyroid nodule 5/29/2017       Past Surgical History:   Procedure Laterality Date    ANTERIOR CERVICAL DISCECTOMY W/ FUSION  2016    ESOPHAGOGASTRODUODENOSCOPY      LAPAROSCOPIC APPENDECTOMY N/A 2/4/2019    Procedure: APPENDECTOMY, LAPAROSCOPIC;  Surgeon: Maikol Banks Jr., MD;   Location: [facility] OR;  Service: General;  Laterality: N/A;    Laparoscopic hh with Toupet      TONSILLECTOMY         Family History   Problem Relation Age of Onset    Hypertension Father     Diabetes Paternal Grandfather     Cancer Neg Hx     Heart disease Neg Hx     Stroke Neg Hx        Social History     Socioeconomic History    Marital status: Single     Spouse name: Not on file    Number of children: 1    Years of education: Not on file    Highest education level: Not on file   Occupational History    Occupation: HR supervisor   Social Needs    Financial resource strain: Not on file    Food insecurity:     Worry: Not on file     Inability: Not on file    Transportation needs:     Medical: Not on file     Non-medical: Not on file   Tobacco Use    Smoking status: Former Smoker     Packs/day: 0.25     Years: 10.00     Pack years: 2.50     Types: Cigarettes     Last attempt to quit: 2012     Years since quittin.0    Smokeless tobacco: Never Used    Tobacco comment: quit smoking    Substance and Sexual Activity    Alcohol use: No    Drug use: No    Sexual activity: Not on file   Lifestyle    Physical activity:     Days per week: Not on file     Minutes per session: Not on file    Stress: Not on file   Relationships    Social connections:     Talks on phone: Not on file     Gets together: Not on file     Attends Faith service: Not on file     Active member of club or organization: Not on file     Attends meetings of clubs or organizations: Not on file     Relationship status: Not on file   Other Topics Concern    Not on file   Social History Narrative    Not on file       MEDICATIONS & ALLERGIES:     Current Outpatient Medications on File Prior to Visit   Medication Sig    acetaZOLAMIDE (DIAMOX) 250 MG tablet Take 1 tablet (250 mg total) by mouth 3 (three) times daily.    buPROPion (WELLBUTRIN SR) 200 MG TbSR 200 mg 2 (two) times daily.    diclofenac sodium (VOLTAREN) 1 %  Gel Apply 2 g topically 4 (four) times daily. (Patient taking differently: Apply 2 g topically as needed. )    lamotrigine (LAMICTAL) 150 MG Tab 150 mg 2 (two) times daily.    norethindrone (MICRONOR) 0.35 mg tablet Take by mouth once daily.     sumatriptan (IMITREX) 100 MG tablet Take 1 tablet (100 mg total) by mouth every 2 (two) hours as needed for Migraine (Max 2 tabs per 24 hours and 12 tablets per week).    TRINTELLIX 10 mg Tab 10 mg once daily.     No current facility-administered medications on file prior to visit.        Review of patient's allergies indicates:   Allergen Reactions    Percocet [oxycodone-acetaminophen] Nausea And Vomiting    Sulfa (sulfonamide antibiotics) Rash    Phentermine Rash       OBJECTIVE:     Vital Signs:  Vitals:    03/12/20 1402   BP: 118/62   BP Location: Left arm   Patient Position: Sitting   BP Method: Large (Manual)   Pulse: 92   Temp: 98.5 °F (36.9 °C)   SpO2: 99%   Weight: 98.8 kg (217 lb 13 oz)   Height: 5' (1.524 m)       Body mass index is 42.54 kg/m².     Physical Exam:  General:  Well developed, well nourished, no acute distress  Head: Normocephalic, atraumatic  Eyes: PERRL, EOMI, clear sclera  Throat: No posterior pharyngeal erythema or exudate, no tonsillar exudate  Neck: supple, normal ROM, no thyromegaly   CVS:  RRR, S1 and S2 normal, no murmurs, rubs, gallops, 2+ peripheral pulses  Resp:  Lungs clear to auscultation, no wheezes, rales, rhonchi, cough  GI:  Abdomen soft, non-tender, non-distended, normoactive bowel sounds  MSK:  No muscle atrophy, cyanosis, peripheral edema   Skin:  No rashes, ulcers, erythema  Neuro:  CNII-XII grossly intact, no focal deficits noted  Psych:  Appropriate mood and affect, normal judgement    Laboratory  Lab Results   Component Value Date    WBC 15.11 (H) 02/04/2019    HGB 12.8 02/04/2019    HCT 40.0 02/04/2019    MCV 90 02/04/2019     02/04/2019     @[id number](GLU,NA,K,Cl,CO2,BUN,Creatinine,Calcium,MG)@  Lab Results    Component Value Date    INR 1.0 08/16/2007     Lab Results   Component Value Date    HGBA1C 5.6 04/20/2019     No results for input(s): POCTGLUCOSE in the last 72 hours.        Health Maintenance Due   Topic Date Due    Pap Smear  04/24/2020       ASSESSMENT & PLAN:   Ms. Sarah Ulloa is a 39 y.o. female here for a 3 days history of flu like symptoms and has not received her vaccine     Myalgia  Cough  Patient with what sounds like the common cold, has not gotten her flu vaccine and given that she lives with a child who also isnt vaccinated will test her for the flu and tell her to practice good hand hygiene. She is also to use OTC cold medication for symptom treatment.    -     Influenza A & B by Molecular        RTC as needed    Discussed with Dr. Miguel   - staff attestation to follow        Shania Butterfield MD, MPH  Internal Medicine PGY3  [facility]  [address] [place] [id number]  [phone]

## 2020-03-12 NOTE — LETTER
March 12, 2020      [facility]  [address]  [facility] [address]  Phone: [phone]  Fax: [phone]       Patient: Sarah Ulloa   YOB: 1980  Date of Visit: 03/12/2020    To Whom It May Concern:    Vinay Ulloa  was at [facility] on 03/12/2020. She may return to work on 3/16  with no restrictions. If you have any questions or concerns, or if I can be of further assistance, please do not hesitate to contact me.    Sincerely,    Shania Butterfield MD

## 2020-03-12 NOTE — PATIENT INSTRUCTIONS
For your symptoms (sinusitis, congestion, cough):  1. Saline nasal spray ([facility]) or nasal rinse ([facility]Med) at least 2-3 times/day  2. Flonase (fluticasone) or other steroid nasal spray - twice a day for 1 week, then once a day thereafter  3. Claritin (loratadine), Zyrtec (cetirizine), or Allegra (fexofenadine) once a day  4. Mucinex (guaifenesin) every 8-12 hours with plenty of water. If you are having a cough, you can use Mucinex-DM (guaifenesin-dextromethorphan).   5. Hot soup, hot tea with honey and lemon.  6. Plenty of sleep!      I will send you a message if its positive and send you the tamiflu to [facility]

## 2020-03-16 ENCOUNTER — OFFICE VISIT (OUTPATIENT)
Dept: URGENT CARE | Facility: CLINIC | Age: 40
End: 2020-03-16
Payer: COMMERCIAL

## 2020-03-16 VITALS
OXYGEN SATURATION: 97 % | SYSTOLIC BLOOD PRESSURE: 104 MMHG | TEMPERATURE: 97 F | DIASTOLIC BLOOD PRESSURE: 58 MMHG | HEART RATE: 91 BPM

## 2020-03-16 DIAGNOSIS — J20.9 ACUTE BRONCHITIS, UNSPECIFIED ORGANISM: Primary | ICD-10-CM

## 2020-03-16 DIAGNOSIS — R05.9 COUGH: ICD-10-CM

## 2020-03-16 PROCEDURE — U0002 COVID-19 LAB TEST NON-CDC: HCPCS

## 2020-03-16 PROCEDURE — 99214 PR OFFICE/OUTPT VISIT, EST, LEVL IV, 30-39 MIN: ICD-10-PCS | Mod: S$GLB,,, | Performed by: NURSE PRACTITIONER

## 2020-03-16 PROCEDURE — 71046 XR CHEST PA AND LATERAL: ICD-10-PCS | Mod: S$GLB,,, | Performed by: RADIOLOGY

## 2020-03-16 PROCEDURE — 71046 X-RAY EXAM CHEST 2 VIEWS: CPT | Mod: S$GLB,,, | Performed by: RADIOLOGY

## 2020-03-16 PROCEDURE — 99214 OFFICE O/P EST MOD 30 MIN: CPT | Mod: S$GLB,,, | Performed by: NURSE PRACTITIONER

## 2020-03-16 RX ORDER — PREDNISONE 20 MG/1
20 TABLET ORAL DAILY
Qty: 5 TABLET | Refills: 0 | Status: SHIPPED | OUTPATIENT
Start: 2020-03-16 | End: 2020-03-21

## 2020-03-16 RX ORDER — DOXYCYCLINE 100 MG/1
100 CAPSULE ORAL 2 TIMES DAILY
Qty: 14 CAPSULE | Refills: 0 | Status: SHIPPED | OUTPATIENT
Start: 2020-03-16 | End: 2020-03-16

## 2020-03-16 RX ORDER — ALBUTEROL SULFATE 90 UG/1
2 AEROSOL, METERED RESPIRATORY (INHALATION)
Qty: 18 G | Refills: 1 | Status: SHIPPED | OUTPATIENT
Start: 2020-03-16 | End: 2020-06-23

## 2020-03-16 NOTE — PROGRESS NOTES
Subjective:       Patient ID: Sarah Ulloa is a 39 y.o. female.    Vitals:  temperature is 97.2 °F (36.2 °C). Her blood pressure is 104/58 (abnormal) and her pulse is 91. Her oxygen saturation is 97%.     Chief Complaint: URI    This is a 39 year old female who presents today with complaints of shortness of breath, chest tightness and non productive cough for the past week. She states her last temp this morning was 99.3. SHe states that she has known positive exposures for the COVID-19 virus at work and was in direct contact with her co-workers. She reports that it is painful at times to take a very deep breath.     URI    This is a new problem. The current episode started in the past 7 days. The problem has been gradually worsening. Associated symptoms include coughing. Pertinent negatives include no chest pain, congestion, diarrhea, dysuria, headaches, nausea, rash, sore throat or vomiting.       Constitution: Positive for chills and fatigue. Negative for fever.   HENT: Negative for congestion and sore throat.    Neck: Negative for painful lymph nodes.   Cardiovascular: Negative for chest pain and leg swelling.   Eyes: Negative for double vision and blurred vision.   Respiratory: Positive for cough and shortness of breath.    Gastrointestinal: Negative for nausea, vomiting and diarrhea.   Genitourinary: Negative for dysuria, frequency, urgency and history of kidney stones.   Musculoskeletal: Negative for joint pain, joint swelling, muscle cramps and muscle ache.   Skin: Negative for color change, pale, rash and bruising.   Allergic/Immunologic: Negative for seasonal allergies.   Neurological: Negative for dizziness, history of vertigo, light-headedness, passing out and headaches.   Hematologic/Lymphatic: Negative for swollen lymph nodes.   Psychiatric/Behavioral: Negative for nervous/anxious, sleep disturbance and depression. The patient is not nervous/anxious.        Objective:      Physical Exam    Constitutional: She is oriented to person, place, and time. She appears well-developed and well-nourished. She is cooperative.  Non-toxic appearance. She does not have a sickly appearance. She does not appear ill. No distress.   HENT:   Head: Normocephalic and atraumatic.   Right Ear: Hearing, tympanic membrane, external ear and ear canal normal.   Left Ear: Hearing, tympanic membrane, external ear and ear canal normal.   Nose: Nose normal. No mucosal edema, rhinorrhea or nasal deformity. No epistaxis. Right sinus exhibits no maxillary sinus tenderness and no frontal sinus tenderness. Left sinus exhibits no maxillary sinus tenderness and no frontal sinus tenderness.   Mouth/Throat: Uvula is midline, oropharynx is clear and moist and mucous membranes are normal. No trismus in the jaw. Normal dentition. No uvula swelling. No oropharyngeal exudate, posterior oropharyngeal edema or posterior oropharyngeal erythema.   Eyes: Conjunctivae and lids are normal. No scleral icterus.   Neck: Trachea normal, full passive range of motion without pain and phonation normal. Neck supple. No neck rigidity. No edema and no erythema present.   Cardiovascular: Normal rate, regular rhythm, normal heart sounds, intact distal pulses and normal pulses.   Pulmonary/Chest: Effort normal and breath sounds normal. No respiratory distress. She has no decreased breath sounds. She has no rhonchi.   Intermittent dry cough   Abdominal: Normal appearance.   Musculoskeletal: Normal range of motion. She exhibits no edema or deformity.   Neurological: She is alert and oriented to person, place, and time. She exhibits normal muscle tone. Coordination normal.   Skin: Skin is warm, dry, intact, not diaphoretic and not pale.   Psychiatric: She has a normal mood and affect. Her speech is normal and behavior is normal. Judgment and thought content normal. Cognition and memory are normal.   Nursing note and vitals reviewed.          Exam performed remotely  due to current state of emergency.   Chest x-ray: No radiographic evidence of acute pulmonic process.  Assessment:       1. Acute bronchitis, unspecified organism    2. Cough        Plan:           Discussed strict precautions with patient and when to seek emergency medical care.     Acute bronchitis, unspecified organism    -     predniSONE (DELTASONE) 20 MG tablet; Take 1 tablet (20 mg total) by mouth once daily. for 5 days  Dispense: 5 tablet; Refill: 0    Cough  -     SARS- CoV-2 (COVID-19) QUALITATIVE PCR  -     XR CHEST PA AND LATERAL; Future; Expected date: 03/16/2020  -     albuterol (PROVENTIL/VENTOLIN HFA) 90 mcg/actuation inhaler; Inhale 2 puffs into the lungs every 4 to 6 hours as needed for Wheezing or Shortness of Breath. Rescue  Dispense: 18 g; Refill: 1           Patient Instructions   YOUR CHEST RAY WAS NEGATIVE TODAY  AVOID ANY WORK UNTIL RESULTS ARE RECEIVED  START USING ALBUTEROL INHALER EVERY 4-6 HOURS AS NEEDED FOR COUGH SHORTNESS OF BREATH  COUGH SYRUP MAY CAUSE YOU TO BE DROWSY RESERVE FOR BED TIME  INCREASE FLUIDS AND REST  TAKE IBUPROFEN OR MOTRIN FOR FEVERS/BODY ACHES    IF YOUR FEVERS REMAIN PERSISTENT AND YOU DO NOT IMPROVE CALL OUR PCP, IF YOU HAVE WORSENING SHORTNESS OF BREATH OR COUGH GO TO ER    If you were prescribed a narcotic medication, do not drive or operate heavy equipment or machinery while taking these medications.  You must understand that you've received an Urgent Care treatment only and that you may be released before all your medical problems are known or treated. You, the patient, will arrange for follow up care as instructed.  If your condition worsens we recommend that you receive another evaluation at the emergency room immediately or contact your primary medical clinics after hours call service to discuss your concerns.  Please return here or go to the Emergency Department for any concerns or worsening of condition.            Bronchitis, Viral (Adult)    You have a  viral bronchitis. Bronchitis is inflammation and swelling of the lining of the lungs. This is often caused by an infection. Symptoms include a dry, hacking cough that is worse at night. The cough may bring up yellow-green mucus. You may also feel short of breath or wheeze. Other symptoms may include tiredness, chest discomfort, and chills.  Bronchitis that is caused by a virus is not treated with antibiotics. Instead, medicines may be given to help relieve symptoms. Symptoms can last up to 2 weeks, although the cough may last much longer.  This illness is contagious during the first few days and is spread through the air by coughing and sneezing, or by direct contact (touching the sick person and then touching your own eyes, nose, or mouth).  Most viral illnesses resolve within 10 to 14 days with rest and simple home remedies, although they may sometimes last for several weeks.  Home care  · If symptoms are severe, rest at home for the first 2 to 3 days. When you go back to your usual activities, don't let yourself get too tired.  · Do not smoke. Also avoid being exposed to secondhand smoke.  · You may use over-the-counter medicine to control fever or pain, unless another pain medicine was prescribed. (Note: If you have chronic liver or kidney disease or have ever had a stomach ulcer or gastrointestinal bleeding, talk with your healthcare provider before using these medicines. Also talk to your provider if you are taking medicine to prevent blood clots.) Aspirin should never be given to anyone younger than 18 years of age who is ill with a viral infection or fever. It may cause severe liver or brain damage.  · Your appetite may be poor, so a light diet is fine. Avoid dehydration by drinking 6 to 8 glasses of fluids per day (such as water, soft drinks, sports drinks, juices, tea, or soup). Extra fluids will help loosen secretions in the nose and lungs.  · Over-the-counter cough, cold, and sore-throat medicines will  not shorten the length of the illness, but they may help to reduce symptoms. (Note: Do not use decongestants if you have high blood pressure.)  Follow-up care  Follow up with your healthcare provider, or as advised. If you had an X-ray or ECG (electrocardiogram), a specialist will review it. You will be notified of any new findings that may affect your care.  Note: If you are age 65 or older, or if you have a chronic lung disease or condition that affects your immune system, or you smoke, talk to your healthcare provider about having pneumococcal vaccinations and a yearly influenza vaccination (flu shot).  When to seek medical advice  Call your healthcare provider right away if any of these occur:  · Fever of 100.4°F (38°C) or higher  · Coughing up increased amounts of colored sputum  · Weakness, drowsiness, headache, facial pain, ear pain, or a stiff neck  Call 911, or get immediate medical care  Contact emergency services right away if any of these occur:  · Coughing up blood  · Worsening weakness, drowsiness, headache, or stiff neck  · Trouble breathing, wheezing, or pain with breathing  Date Last Reviewed: 9/13/2015  © 5415-8442 [employer]. [address] [place] [address], [place] [address]. All rights reserved. This information is not intended as a substitute for professional medical care. Always follow your healthcare professional's instructions.

## 2020-03-16 NOTE — LETTER
March 16, 2020      [facility] [address] [address]  Phone: [phone]  Fax: [phone]       Patient: Sarah Ulloa   YOB: 1980  Date of Visit: 03/16/2020    To Whom It May Concern:    Vinay Ulloa  was at [facility] on 03/16/2020. She may return to work/school ONCE COVID RESULTS ARE RECEIVED AND ARE NEGATIVE. AVOID WORK UNTIL FEVER FREE FOR 24 HOURS AND UNTIL COVID RESULTS ARE RECEIVED with no restrictions. If you have any questions or concerns, or if I can be of further assistance, please do not hesitate to contact me.    Sincerely,        Uzma Johnson NP

## 2020-03-16 NOTE — PATIENT INSTRUCTIONS
YOUR CHEST RAY WAS NEGATIVE TODAY  AVOID ANY WORK UNTIL RESULTS ARE RECEIVED  START USING ALBUTEROL INHALER EVERY 4-6 HOURS AS NEEDED FOR COUGH SHORTNESS OF BREATH  COUGH SYRUP MAY CAUSE YOU TO BE DROWSY RESERVE FOR BED TIME  INCREASE FLUIDS AND REST  TAKE IBUPROFEN OR MOTRIN FOR FEVERS/BODY ACHES    IF YOUR FEVERS REMAIN PERSISTENT AND YOU DO NOT IMPROVE CALL OUR PCP, IF YOU HAVE WORSENING SHORTNESS OF BREATH OR COUGH GO TO ER    If you were prescribed a narcotic medication, do not drive or operate heavy equipment or machinery while taking these medications.  You must understand that you've received an Urgent Care treatment only and that you may be released before all your medical problems are known or treated. You, the patient, will arrange for follow up care as instructed.  If your condition worsens we recommend that you receive another evaluation at the emergency room immediately or contact your primary medical clinics after hours call service to discuss your concerns.  Please return here or go to the Emergency Department for any concerns or worsening of condition.            Bronchitis, Viral (Adult)    You have a viral bronchitis. Bronchitis is inflammation and swelling of the lining of the lungs. This is often caused by an infection. Symptoms include a dry, hacking cough that is worse at night. The cough may bring up yellow-green mucus. You may also feel short of breath or wheeze. Other symptoms may include tiredness, chest discomfort, and chills.  Bronchitis that is caused by a virus is not treated with antibiotics. Instead, medicines may be given to help relieve symptoms. Symptoms can last up to 2 weeks, although the cough may last much longer.  This illness is contagious during the first few days and is spread through the air by coughing and sneezing, or by direct contact (touching the sick person and then touching your own eyes, nose, or mouth).  Most viral illnesses resolve within 10 to 14 days with rest  and simple home remedies, although they may sometimes last for several weeks.  Home care  · If symptoms are severe, rest at home for the first 2 to 3 days. When you go back to your usual activities, don't let yourself get too tired.  · Do not smoke. Also avoid being exposed to secondhand smoke.  · You may use over-the-counter medicine to control fever or pain, unless another pain medicine was prescribed. (Note: If you have chronic liver or kidney disease or have ever had a stomach ulcer or gastrointestinal bleeding, talk with your healthcare provider before using these medicines. Also talk to your provider if you are taking medicine to prevent blood clots.) Aspirin should never be given to anyone younger than 18 years of age who is ill with a viral infection or fever. It may cause severe liver or brain damage.  · Your appetite may be poor, so a light diet is fine. Avoid dehydration by drinking 6 to 8 glasses of fluids per day (such as water, soft drinks, sports drinks, juices, tea, or soup). Extra fluids will help loosen secretions in the nose and lungs.  · Over-the-counter cough, cold, and sore-throat medicines will not shorten the length of the illness, but they may help to reduce symptoms. (Note: Do not use decongestants if you have high blood pressure.)  Follow-up care  Follow up with your healthcare provider, or as advised. If you had an X-ray or ECG (electrocardiogram), a specialist will review it. You will be notified of any new findings that may affect your care.  Note: If you are age 65 or older, or if you have a chronic lung disease or condition that affects your immune system, or you smoke, talk to your healthcare provider about having pneumococcal vaccinations and a yearly influenza vaccination (flu shot).  When to seek medical advice  Call your healthcare provider right away if any of these occur:  · Fever of 100.4°F (38°C) or higher  · Coughing up increased amounts of colored sputum  · Weakness,  drowsiness, headache, facial pain, ear pain, or a stiff neck  Call 911, or get immediate medical care  Contact emergency services right away if any of these occur:  · Coughing up blood  · Worsening weakness, drowsiness, headache, or stiff neck  · Trouble breathing, wheezing, or pain with breathing  Date Last Reviewed: 9/13/2015  © 5893-6379 [employer]. [address] [place] [address]. All rights reserved. This information is not intended as a substitute for professional medical care. Always follow your healthcare professional's instructions.

## 2020-03-23 ENCOUNTER — PATIENT MESSAGE (OUTPATIENT)
Dept: INTERNAL MEDICINE | Facility: CLINIC | Age: 40
End: 2020-03-23

## 2020-03-24 ENCOUNTER — TELEPHONE (OUTPATIENT)
Dept: URGENT CARE | Facility: CLINIC | Age: 40
End: 2020-03-24

## 2020-03-24 NOTE — TELEPHONE ENCOUNTER
Patient called to check on results.  Informed that results are still pending at this time.  All questions answered.  Encouraged to stay on isolation.

## 2020-03-28 ENCOUNTER — TELEPHONE (OUTPATIENT)
Dept: URGENT CARE | Facility: CLINIC | Age: 40
End: 2020-03-28

## 2020-03-28 LAB — SARS-COV-2 RNA RESP QL NAA+PROBE: NOT DETECTED

## 2020-03-28 NOTE — TELEPHONE ENCOUNTER
Spoke with pt regarding negative COVID result. She is feeling much better. Denies any fever, cough, congestion. All questions answered.

## 2020-04-15 ENCOUNTER — PATIENT MESSAGE (OUTPATIENT)
Dept: INTERNAL MEDICINE | Facility: CLINIC | Age: 40
End: 2020-04-15

## 2020-04-27 ENCOUNTER — PATIENT MESSAGE (OUTPATIENT)
Dept: INTERNAL MEDICINE | Facility: CLINIC | Age: 40
End: 2020-04-27

## 2020-04-28 NOTE — TELEPHONE ENCOUNTER
[place] for the patient to call the office to schedule patient for a virtual visit with Dr. Arce

## 2020-05-01 ENCOUNTER — TELEPHONE (OUTPATIENT)
Dept: INTERNAL MEDICINE | Facility: CLINIC | Age: 40
End: 2020-05-01

## 2020-05-01 NOTE — TELEPHONE ENCOUNTER
[place] for the patient to call the office. Patient would like to schedule an appointment with PCP or Dr. Arce today or next week for diarrhea symptoms.

## 2020-06-08 DIAGNOSIS — Z13.6 ENCOUNTER FOR LIPID SCREENING FOR CARDIOVASCULAR DISEASE: ICD-10-CM

## 2020-06-08 DIAGNOSIS — Z00.00 ANNUAL PHYSICAL EXAM: Primary | ICD-10-CM

## 2020-06-08 DIAGNOSIS — E55.9 VITAMIN D DEFICIENCY: ICD-10-CM

## 2020-06-08 DIAGNOSIS — Z13.220 ENCOUNTER FOR LIPID SCREENING FOR CARDIOVASCULAR DISEASE: ICD-10-CM

## 2020-06-08 DIAGNOSIS — M79.674 GREAT TOE PAIN, RIGHT: ICD-10-CM

## 2020-06-08 RX ORDER — DICLOFENAC SODIUM 10 MG/G
2 GEL TOPICAL 4 TIMES DAILY
Qty: 100 G | Refills: 0 | Status: SHIPPED | OUTPATIENT
Start: 2020-06-08 | End: 2021-09-16

## 2020-06-10 ENCOUNTER — OFFICE VISIT (OUTPATIENT)
Dept: OTOLARYNGOLOGY | Facility: CLINIC | Age: 40
End: 2020-06-10
Attending: OTOLARYNGOLOGY
Payer: COMMERCIAL

## 2020-06-10 VITALS
SYSTOLIC BLOOD PRESSURE: 129 MMHG | WEIGHT: 214.75 LBS | HEART RATE: 91 BPM | DIASTOLIC BLOOD PRESSURE: 88 MMHG | BODY MASS INDEX: 41.94 KG/M2

## 2020-06-10 DIAGNOSIS — J01.91 ACUTE RECURRENT SINUSITIS, UNSPECIFIED LOCATION: ICD-10-CM

## 2020-06-10 DIAGNOSIS — J31.0 CHRONIC RHINITIS: Primary | ICD-10-CM

## 2020-06-10 PROCEDURE — 99999 PR PBB SHADOW E&M-EST. PATIENT-LVL III: ICD-10-PCS | Mod: PBBFAC,,, | Performed by: OTOLARYNGOLOGY

## 2020-06-10 PROCEDURE — 3008F PR BODY MASS INDEX (BMI) DOCUMENTED: ICD-10-PCS | Mod: CPTII,S$GLB,, | Performed by: OTOLARYNGOLOGY

## 2020-06-10 PROCEDURE — 99204 OFFICE O/P NEW MOD 45 MIN: CPT | Mod: S$GLB,,, | Performed by: OTOLARYNGOLOGY

## 2020-06-10 PROCEDURE — 99204 PR OFFICE/OUTPT VISIT, NEW, LEVL IV, 45-59 MIN: ICD-10-PCS | Mod: S$GLB,,, | Performed by: OTOLARYNGOLOGY

## 2020-06-10 PROCEDURE — 99999 PR PBB SHADOW E&M-EST. PATIENT-LVL III: CPT | Mod: PBBFAC,,, | Performed by: OTOLARYNGOLOGY

## 2020-06-10 PROCEDURE — 3008F BODY MASS INDEX DOCD: CPT | Mod: CPTII,S$GLB,, | Performed by: OTOLARYNGOLOGY

## 2020-06-10 NOTE — PATIENT INSTRUCTIONS
Get Silas Med Sinus Rinse from the pharmacy.  Use it daily according to the instructions.  It will help to cut down the inflammation in your nose and sinuses.

## 2020-06-10 NOTE — PROGRESS NOTES
"  Subjective:      Sarah Ulloa is a 39 y.o. female who was self-referred for nasal obstruction.She perceives nasal obstruction through both nares, greater in the right, which causes her to breathe heavily. She notes an occasional dull ache along the right ethmoid region, which he describes as a feeling of " cotton stuck causing pressure". She reports having 6-7 sinus infections per year, only occasionally treated with antibiotics.   She also notes aural fullness, greater in the left side    Current sinonasal medications include none at present.  She has used Flonase in the past but did not feel that it helped much. The last course of antibiotics was a Zpack 8/2019 and Augmentin 1/2019.  She does not regularly use nasal decongestant sprays.    She recalls previously having allergy testing around 8-9 years old, which she thinks were negative.     She relates a history of asthma as a child.    She relates a history of reflux symptomswhich she states was treated with a Nissen Fundoplication in 2017. She has only had one episode of heartburn/GERD since the surgery.    She has previously had an EGD.    She denies a diagnosis of obstructive sleep apnea.     She has not had sinonasal surgery.   She has had a tonsillectomy in 2007.    She recalls a prior history of nasal trauma consisting of being hit in the nose with a metal baby rattle wehn she was 6-7 years old. .    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    Past Surgical History  She has a past surgical history that includes Anterior cervical discectomy w/ fusion (2016); Tonsillectomy (2007); Esophagogastroduodenoscopy; Laparoscopic hh with Toupet; and " Laparoscopic appendectomy (N/A, 2/4/2019).    Family History  Her family history includes Diabetes in her paternal grandfather; Hypertension in her father.    Social History  She reports that she quit smoking about 8 years ago. Her smoking use included cigarettes. She has a 2.50 pack-year smoking history. She has never used smokeless tobacco. She reports that she does not drink alcohol or use drugs.    Allergies  She is allergic to percocet [oxycodone-acetaminophen]; sulfa (sulfonamide antibiotics); and phentermine.    Medications   She has a current medication list which includes the following prescription(s): acetazolamide, bupropion, diclofenac sodium, lamotrigine, norethindrone, trintellix, albuterol, and sumatriptan.    Review of Systems  Review of Systems   Constitutional: Negative for fatigue, fever and unexpected weight change.   HENT: Positive for congestion, ear pain, postnasal drip, rhinorrhea, sore throat and tinnitus. Negative for dental problem, ear discharge, facial swelling, hearing loss, hoarse voice, nosebleeds, sinus pressure, trouble swallowing and voice change.    Eyes: Negative for photophobia, discharge, itching and visual disturbance.   Respiratory: Positive for cough. Negative for apnea, shortness of breath and wheezing.    Cardiovascular: Negative for chest pain and palpitations.   Gastrointestinal: Positive for diarrhea. Negative for abdominal pain, nausea and vomiting.   Endocrine: Negative for cold intolerance and heat intolerance.   Genitourinary: Negative for difficulty urinating.   Musculoskeletal: Positive for back pain and neck pain. Negative for arthralgias and myalgias.   Skin: Negative for rash.   Allergic/Immunologic: Negative for environmental allergies and food allergies.   Neurological: Positive for headaches. Negative for dizziness, seizures, syncope and weakness.   Hematological: Negative for adenopathy. Bruises/bleeds easily.   Psychiatric/Behavioral: Positive for sleep  disturbance. Negative for decreased concentration and dysphoric mood. The patient is not nervous/anxious.           Objective:     /88   Pulse 91   Wt 97.4 kg (214 lb 11.7 oz)   BMI 41.94 kg/m²        Constitutional:   She appears well-developed. She is cooperative. Normal speech.  No hoarse voice.      Head:  Normocephalic. Salivary glands normal.  Facial strength is normal.      Ears:    Right Ear: No drainage or tenderness. Tympanic membrane is not perforated. Tympanic membrane mobility is normal. No middle ear effusion. No decreased hearing is noted.   Left Ear: No drainage or tenderness. Tympanic membrane is not perforated. Tympanic membrane mobility is normal.  No middle ear effusion. No decreased hearing is noted.     Nose:  Septal deviation present. No mucosal edema, rhinorrhea or polyps. No epistaxis. Turbinate hypertrophy.  Turbinates normal and no turbinate masses.  Right sinus exhibits no maxillary sinus tenderness and no frontal sinus tenderness. Left sinus exhibits no maxillary sinus tenderness and no frontal sinus tenderness.   Slight R NSD   IT hypertrophy    Mouth/Throat  Oropharynx clear and moist without lesions or asymmetry and normal uvula midline. She does not have dentures. Normal dentition. No oral lesions or mucous membrane lesions. No oropharyngeal exudate or posterior oropharyngeal erythema. Tonsils not present.  Mirror exam not performed due to patient tolerance.  Mirror exam not performed due to patient tolerance.      Neck:  Neck normal without thyromegaly masses, asymmetry, normal tracheal structure, crepitus, and tenderness, thyroid normal, trachea normal and no adenopathy. Normal range of motion present.     She has no cervical adenopathy.     Cardiovascular:   Regular rhythm.      Pulmonary/Chest:   Effort normal.     Psychiatric:   She has a normal mood and affect. Her speech is normal and behavior is normal.     Neurological:   No cranial nerve deficit.     Skin:   No  rash noted.       Procedure    None        Data Reviewed    WBC (K/uL)   Date Value   02/04/2019 15.11 (H)     Eosinophil% (%)   Date Value   02/04/2019 1.1     Eos # (K/uL)   Date Value   02/04/2019 0.2     Platelets (K/uL)   Date Value   02/04/2019 320     Glucose (mg/dL)   Date Value   02/04/2019 112 (H)     No results found for: IGE    No sinus imaging available.       Assessment:     1. Chronic rhinitis    2. Acute recurrent sinusitis, unspecified location         Plan:     I had a long discussion with the patient regarding her condition and the further workup and management options.    I also recommended evaluation by a medical allergist, including sensitivity testing, for which I placed a referral.  I ordered serologic testing for S pneumoniae titers to assess for deficient humoral immunity.  If those levels are in the nonprotective range, then a Pneumovax booster shot would be ordered, to be followed 6 to 8 weeks later by repeat testing.   I prescribed the daily use of isotonic saline sinus irrigation in a [employer]as Med bottle.  Follow up for test results.

## 2020-06-11 ENCOUNTER — PATIENT MESSAGE (OUTPATIENT)
Dept: INTERNAL MEDICINE | Facility: CLINIC | Age: 40
End: 2020-06-11

## 2020-06-11 DIAGNOSIS — K14.1 GEOGRAPHIC TONGUE: Primary | ICD-10-CM

## 2020-06-11 RX ORDER — TRIAMCINOLONE ACETONIDE 1 MG/G
PASTE DENTAL
COMMUNITY
End: 2020-06-11 | Stop reason: SDUPTHER

## 2020-06-12 RX ORDER — TRIAMCINOLONE ACETONIDE 1 MG/G
PASTE DENTAL 2 TIMES DAILY
Qty: 15 G | Refills: 0 | Status: SHIPPED | OUTPATIENT
Start: 2020-06-12 | End: 2021-09-16

## 2020-06-15 ENCOUNTER — PATIENT MESSAGE (OUTPATIENT)
Dept: INTERNAL MEDICINE | Facility: CLINIC | Age: 40
End: 2020-06-15

## 2020-06-16 ENCOUNTER — PATIENT MESSAGE (OUTPATIENT)
Dept: INTERNAL MEDICINE | Facility: CLINIC | Age: 40
End: 2020-06-16

## 2020-06-17 ENCOUNTER — PATIENT MESSAGE (OUTPATIENT)
Dept: OTOLARYNGOLOGY | Facility: CLINIC | Age: 40
End: 2020-06-17

## 2020-06-17 DIAGNOSIS — J01.91 ACUTE RECURRENT SINUSITIS, UNSPECIFIED LOCATION: Primary | ICD-10-CM

## 2020-06-18 ENCOUNTER — CLINICAL SUPPORT (OUTPATIENT)
Dept: INTERNAL MEDICINE | Facility: CLINIC | Age: 40
End: 2020-06-18
Payer: COMMERCIAL

## 2020-06-18 PROCEDURE — 90732 PPSV23 VACC 2 YRS+ SUBQ/IM: CPT | Mod: S$GLB,,, | Performed by: INTERNAL MEDICINE

## 2020-06-18 PROCEDURE — 99999 PR PBB SHADOW E&M-EST. PATIENT-LVL I: CPT | Mod: PBBFAC,,,

## 2020-06-18 PROCEDURE — 90471 PNEUMOCOCCAL POLYSACCHARIDE VACCINE 23-VALENT =>2YO SQ IM: ICD-10-PCS | Mod: S$GLB,,, | Performed by: INTERNAL MEDICINE

## 2020-06-18 PROCEDURE — 90471 IMMUNIZATION ADMIN: CPT | Mod: S$GLB,,, | Performed by: INTERNAL MEDICINE

## 2020-06-18 PROCEDURE — 99999 PR PBB SHADOW E&M-EST. PATIENT-LVL I: ICD-10-PCS | Mod: PBBFAC,,,

## 2020-06-18 PROCEDURE — 90732 PNEUMOCOCCAL POLYSACCHARIDE VACCINE 23-VALENT =>2YO SQ IM: ICD-10-PCS | Mod: S$GLB,,, | Performed by: INTERNAL MEDICINE

## 2020-06-23 ENCOUNTER — OFFICE VISIT (OUTPATIENT)
Dept: ALLERGY | Facility: CLINIC | Age: 40
End: 2020-06-23
Payer: COMMERCIAL

## 2020-06-23 ENCOUNTER — LAB VISIT (OUTPATIENT)
Dept: LAB | Facility: HOSPITAL | Age: 40
End: 2020-06-23
Attending: ALLERGY & IMMUNOLOGY
Payer: COMMERCIAL

## 2020-06-23 VITALS — WEIGHT: 214.94 LBS | HEIGHT: 60 IN | BODY MASS INDEX: 42.2 KG/M2

## 2020-06-23 DIAGNOSIS — R76.0 ABNORMAL ANTIBODY TITER: Primary | ICD-10-CM

## 2020-06-23 DIAGNOSIS — J31.0 CHRONIC RHINITIS: ICD-10-CM

## 2020-06-23 DIAGNOSIS — J06.9 RECURRENT URI (UPPER RESPIRATORY INFECTION): ICD-10-CM

## 2020-06-23 PROCEDURE — 86003 ALLG SPEC IGE CRUDE XTRC EA: CPT | Mod: 59

## 2020-06-23 PROCEDURE — 99204 OFFICE O/P NEW MOD 45 MIN: CPT | Mod: S$GLB,,, | Performed by: ALLERGY & IMMUNOLOGY

## 2020-06-23 PROCEDURE — 86003 ALLG SPEC IGE CRUDE XTRC EA: CPT

## 2020-06-23 PROCEDURE — 99204 PR OFFICE/OUTPT VISIT, NEW, LEVL IV, 45-59 MIN: ICD-10-PCS | Mod: S$GLB,,, | Performed by: ALLERGY & IMMUNOLOGY

## 2020-06-23 PROCEDURE — 36415 COLL VENOUS BLD VENIPUNCTURE: CPT | Mod: PO

## 2020-06-23 PROCEDURE — 99999 PR PBB SHADOW E&M-EST. PATIENT-LVL III: CPT | Mod: PBBFAC,,, | Performed by: ALLERGY & IMMUNOLOGY

## 2020-06-23 PROCEDURE — 99999 PR PBB SHADOW E&M-EST. PATIENT-LVL III: ICD-10-PCS | Mod: PBBFAC,,, | Performed by: ALLERGY & IMMUNOLOGY

## 2020-06-23 NOTE — LETTER
June 28, 2020      Mike Alexander MD  [address]  [facility] [address]           [facility]  [address] [facility] [address]  Phone: [phone]          Patient: Sarah Ulloa   MR Number: [id number]   YOB: 1980   Date of Visit: 6/23/2020       Dear Dr. Mike Alexander:    Thank you for referring Sarah Ulloa to me for evaluation. Attached you will find relevant portions of my assessment and plan of care.    If you have questions, please do not hesitate to call me. I look forward to following Sarah Ulloa along with you.    Sincerely,    Jimy Garcia MD    Enclosure  CC:  No Recipients    If you would like to receive this communication electronically, please contact [email or web address] or [phone] to request more information on [employer] Link access.    For providers and/or their staff who would like to refer a patient to [facility], please contact us through our one-stop-shop provider referral line, [facility], at [phone].    If you feel you have received this communication in error or would no longer like to receive these types of communications, please e-mail [email or web address]

## 2020-06-23 NOTE — PROGRESS NOTES
Subjective:       Patient ID: Sarah Ulloa is a 40 y.o. female.    Chief Complaint:  Cough and Nasal Congestion      HPI:   Patient's symptoms include clear rhinorrhea, cough, itchy nose, nasal congestion, postnasal drip, sinus pressure and sneezing. These symptoms are perennial with seasonal exacerbation. Worse in spring, winter. Current triggers include exposure to no known precipitant. The patient has been suffering from these symptoms for approximately 4 years. The patient has tried flonase, claritin with fair relief of symptoms. Immunotherapy has never been tried. The patient has never had nasal polyps. The patient has a history of asthma. distant hx wheeze, mostly childhood. Rare wheeze w resp infection as adult.  The patient has no history of eczema. The patient takes antibiotics for sinusitis 3 times per year. reports suspected sinusitis and/or bronchitis about 6-7 times per year over last 4 years. No hx pneumonia. Notes temp relief w abx. The patient has not had sinus surgery in the past.   Noted to have low pneumo titers by ENT. Has received pneumovax. Post titers to be checked in 4-6 weeks    Environmental History: Pets in the home: dogs (1) and cats (3).  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   HENT: Positive for congestion, postnasal drip,  rhinorrhea, sinus pressure and sneezing.    Eyes: Positive for itching. Negative for discharge and redness.   Respiratory: Positive for cough. Negative for shortness of breath and wheezing.    Cardiovascular: Negative for chest pain.   Gastrointestinal: Negative for diarrhea, nausea and vomiting.   Genitourinary: Negative for dysuria.   Musculoskeletal: Negative for arthralgias and joint swelling.   Skin: Negative for rash.   Neurological: Negative for headaches.   Hematological: Does not bruise/bleed easily.   Psychiatric/Behavioral: Negative for behavioral problems and sleep disturbance.          Objective:   Physical Exam  Vitals signs and nursing note reviewed.   Constitutional:       General: She is not in acute distress.     Appearance: She is well-developed.   HENT:      Head: Normocephalic.      Right Ear: Tympanic membrane and external ear normal.      Left Ear: Tympanic membrane and external ear normal.      Nose: No septal deviation, mucosal edema or rhinorrhea.      Right Sinus: No maxillary sinus tenderness or frontal sinus tenderness.      Left Sinus: No maxillary sinus tenderness or frontal sinus tenderness.      Mouth/Throat:      Pharynx: Uvula midline. No uvula swelling.   Eyes:      General:         Right eye: No discharge.         Left eye: No discharge.      Conjunctiva/sclera: Conjunctivae normal.   Neck:      Musculoskeletal: Normal range of motion and neck supple.   Cardiovascular:      Rate and Rhythm: Normal rate and regular rhythm.   Pulmonary:      Effort: Pulmonary effort is normal. No respiratory distress.      Breath sounds: Normal breath sounds. No wheezing.   Abdominal:      General: Bowel sounds are normal.      Palpations: Abdomen is soft.      Tenderness: There is no abdominal tenderness.   Musculoskeletal: Normal range of motion.         General: No tenderness.   Lymphadenopathy:      Cervical: No cervical adenopathy.   Skin:     General: Skin is warm.      Findings: No erythema  or rash.   Neurological:      Mental Status: She is alert and oriented to person, place, and time.   Psychiatric:         Behavior: Behavior normal.         Thought Content: Thought content normal.         Judgment: Judgment normal.             Assessment:       1. Abnormal antibody titer    2. Chronic rhinitis    3. Recurrent URI (upper respiratory infection)         Plan:       Sarah was seen today for cough and nasal congestion.    Diagnoses and all orders for this visit:    Abnormal antibody titer    Chronic rhinitis  -     Ambulatory referral/consult to Allergy  -     Cat epithelium IgE; Future  -     Dog dander IgE; Future  -     D. farinae IgE; Future  -     D. pteronyssinus IgE; Future  -     Allergen-Alternaria Alternata; Future  -     Aspergillus fumagatus IgE; Future  -     Cockroach, American IgE; Future  -     Bahia grass IgE; Future  -     Celestino IgE; Future  -     Oak, white IgE; Future  -     Allergen-Cedar; Future  -     Allergen, Pecan Tree IgE; Future  -     Ragweed, short, common IgE; Future  -     Marsh elder, rough IgE; Future  -     Plantain, English IgE; Future    Recurrent URI (upper respiratory infection)    follow response to pneumovax challenge

## 2020-06-26 LAB
A ALTERNATA IGE QN: <0.1 KU/L
A FUMIGATUS IGE QN: <0.1 KU/L
BAHIA GRASS IGE QN: <0.1 KU/L
CAT DANDER IGE QN: <0.1 KU/L
CEDAR IGE QN: <0.1 KU/L
COMMON RAGWEED IGE QN: <0.1 KU/L
D FARINAE IGE QN: <0.1 KU/L
D PTERONYSS IGE QN: <0.1 KU/L
DEPRECATED A ALTERNATA IGE RAST QL: NORMAL
DEPRECATED A FUMIGATUS IGE RAST QL: NORMAL
DEPRECATED BAHIA GRASS IGE RAST QL: NORMAL
DEPRECATED CAT DANDER IGE RAST QL: NORMAL
DEPRECATED CEDAR IGE RAST QL: NORMAL
DEPRECATED COMMON RAGWEED IGE RAST QL: NORMAL
DEPRECATED D FARINAE IGE RAST QL: NORMAL
DEPRECATED D PTERONYSS IGE RAST QL: NORMAL
DEPRECATED DOG DANDER IGE RAST QL: NORMAL
DEPRECATED ELDER IGE RAST QL: NORMAL
DEPRECATED ENGL PLANTAIN IGE RAST QL: NORMAL
DEPRECATED PECAN/HICK TREE IGE RAST QL: NORMAL
DEPRECATED ROACH IGE RAST QL: NORMAL
DEPRECATED TIMOTHY IGE RAST QL: NORMAL
DEPRECATED WHITE OAK IGE RAST QL: NORMAL
DOG DANDER IGE QN: <0.1 KU/L
ELDER IGE QN: <0.1 KU/L
ENGL PLANTAIN IGE QN: <0.1 KU/L
PECAN/HICK TREE IGE QN: <0.1 KU/L
ROACH IGE QN: <0.1 KU/L
TIMOTHY IGE QN: <0.1 KU/L
WHITE OAK IGE QN: <0.1 KU/L

## 2020-07-09 ENCOUNTER — PATIENT MESSAGE (OUTPATIENT)
Dept: OTOLARYNGOLOGY | Facility: CLINIC | Age: 40
End: 2020-07-09

## 2020-07-09 DIAGNOSIS — J01.91 ACUTE RECURRENT SINUSITIS, UNSPECIFIED LOCATION: Primary | ICD-10-CM

## 2020-07-13 RX ORDER — AZITHROMYCIN 250 MG/1
250 TABLET, FILM COATED ORAL DAILY
Qty: 6 TABLET | Refills: 0 | Status: SHIPPED | OUTPATIENT
Start: 2020-07-13 | End: 2020-07-18

## 2020-07-22 ENCOUNTER — LAB VISIT (OUTPATIENT)
Dept: LAB | Facility: HOSPITAL | Age: 40
End: 2020-07-22
Attending: OTOLARYNGOLOGY
Payer: COMMERCIAL

## 2020-07-22 DIAGNOSIS — J01.91 ACUTE RECURRENT SINUSITIS, UNSPECIFIED LOCATION: ICD-10-CM

## 2020-07-22 PROCEDURE — 86317 IMMUNOASSAY INFECTIOUS AGENT: CPT | Mod: 59

## 2020-07-22 PROCEDURE — 36415 COLL VENOUS BLD VENIPUNCTURE: CPT

## 2020-07-28 ENCOUNTER — PATIENT MESSAGE (OUTPATIENT)
Dept: OTOLARYNGOLOGY | Facility: CLINIC | Age: 40
End: 2020-07-28

## 2020-07-28 LAB
DEPRECATED S PNEUM12 IGG SER-MCNC: 25.2 UG/ML
DEPRECATED S PNEUM23 IGG SER-MCNC: 12.9 UG/ML
DEPRECATED S PNEUM4 IGG SER-MCNC: 20 UG/ML
DEPRECATED S PNEUM8 IGG SER-MCNC: >86 UG/ML
DEPRECATED S PNEUM9 IGG SER-MCNC: 9.9 UG/ML
S PN DA SERO 19F IGG SER-MCNC: 12.1 UG/ML
S PNEUM DA 1 IGG SER-MCNC: >111 UG/ML
S PNEUM DA 14 IGG SER-MCNC: 25.2
S PNEUM DA 18C IGG SER-MCNC: 51.6
S PNEUM DA 3 IGG SER-MCNC: 12.2 UG/ML
S PNEUM DA 5 IGG SER-MCNC: 89.8 UG/ML
S PNEUM DA 6B IGG SER-MCNC: 0.4 UG/ML
S PNEUM DA 7F IGG SER-MCNC: 1.6 UG/ML
S PNEUM DA 9V IGG SER-MCNC: 4.9 UG/ML

## 2020-07-29 ENCOUNTER — PATIENT MESSAGE (OUTPATIENT)
Dept: INTERNAL MEDICINE | Facility: CLINIC | Age: 40
End: 2020-07-29

## 2020-08-04 ENCOUNTER — TELEPHONE (OUTPATIENT)
Dept: INTERNAL MEDICINE | Facility: CLINIC | Age: 40
End: 2020-08-04

## 2020-08-04 ENCOUNTER — PATIENT OUTREACH (OUTPATIENT)
Dept: ADMINISTRATIVE | Facility: HOSPITAL | Age: 40
End: 2020-08-04

## 2020-08-04 DIAGNOSIS — Z12.4 CERVICAL CANCER SCREENING: Primary | ICD-10-CM

## 2020-08-05 ENCOUNTER — OFFICE VISIT (OUTPATIENT)
Dept: INTERNAL MEDICINE | Facility: CLINIC | Age: 40
End: 2020-08-05
Payer: COMMERCIAL

## 2020-08-05 VITALS — BODY MASS INDEX: 41.99 KG/M2 | WEIGHT: 215 LBS | TEMPERATURE: 96 F

## 2020-08-05 DIAGNOSIS — G93.2 PSEUDOTUMOR CEREBRI SYNDROME: ICD-10-CM

## 2020-08-05 DIAGNOSIS — G43.829 MENSTRUAL MIGRAINE WITHOUT STATUS MIGRAINOSUS, NOT INTRACTABLE: Primary | ICD-10-CM

## 2020-08-05 PROCEDURE — 99213 OFFICE O/P EST LOW 20 MIN: CPT | Mod: 95,,, | Performed by: FAMILY MEDICINE

## 2020-08-05 PROCEDURE — 3008F BODY MASS INDEX DOCD: CPT | Mod: CPTII,,, | Performed by: FAMILY MEDICINE

## 2020-08-05 PROCEDURE — 99213 PR OFFICE/OUTPT VISIT, EST, LEVL III, 20-29 MIN: ICD-10-PCS | Mod: 95,,, | Performed by: FAMILY MEDICINE

## 2020-08-05 PROCEDURE — 3008F PR BODY MASS INDEX (BMI) DOCUMENTED: ICD-10-PCS | Mod: CPTII,,, | Performed by: FAMILY MEDICINE

## 2020-08-05 RX ORDER — BUTALBITAL, ACETAMINOPHEN AND CAFFEINE 50; 325; 40 MG/1; MG/1; MG/1
1 TABLET ORAL 2 TIMES DAILY PRN
Qty: 30 TABLET | Refills: 0 | Status: SHIPPED | OUTPATIENT
Start: 2020-08-05 | End: 2020-09-04

## 2020-08-05 RX ORDER — LANOLIN ALCOHOL/MO/W.PET/CERES
400 CREAM (GRAM) TOPICAL DAILY
Qty: 90 TABLET | Refills: 1 | Status: SHIPPED | OUTPATIENT
Start: 2020-08-05 | End: 2020-12-21

## 2020-08-05 NOTE — PROGRESS NOTES
Subjective:       Patient ID: Sarah Ulloa is a 40 y.o. female.    Chief Complaint: Headache    HPI  41 y/o female former smoker (quit 2012) with migraines, hx of GERD s/p Nissen in 3/2017, hx of thyroid nodules, Factor V Leiden def (dx in 2006), CLBP, Bipolar 1, Pseudotumor Cerebri is here for follow migraines.     She has been having increased headaches, occurs right before her cycle starts, pain is frontal, pain is throbbing, pain 6/10, pain can last days, she has noise sensitiviy, she denies vision changes/n/v, she says these feel different than her normal migraines, she has tried taking 2 Aleve which is not helping, she tried Imitrex with helps with headache but she feels bad for the rest of the day from imitrex side effects, she has not tried Fioricet.  She is followed by Dr. Pedersen and has been on Micronor for the past year and her cycles are irregular, planning for tubal ligation and D &C so she can stop ocp.  She denies f/d/constipation/cp/sob/urinary sx. She is eating healthy, she is riding her bike and active at work.  She is sleeping well. Mood good.     Migraines: well controlled on Diamox, rare Imitrex   Hx of thyroid nodules: 5/2017  Bipolar 1: followed by Dr. Mata, lamotrigine, wellbutrin, trintellix  CLBP and Neck pain: she is off tramadol, was followed by Dr. Heredia, was getting periodic neck injections but no longer following  GYN: followed by Dr. Pedersen at [facility], on Micronor   Eye exam utd follows with Monalisa  Dental utd  Vaccines: utd    Labs set later this month    Review of Systems   Constitutional: Negative for activity change and unexpected weight change.   HENT: Negative for hearing loss, rhinorrhea and trouble swallowing.    Eyes: Negative for discharge and visual disturbance.   Respiratory: Negative for chest tightness and wheezing.    Cardiovascular: Negative for chest pain and palpitations.   Gastrointestinal: Negative for blood in stool, constipation,  diarrhea and vomiting.   Endocrine: Negative for polydipsia and polyuria.   Genitourinary: Positive for menstrual problem. Negative for difficulty urinating, dysuria and hematuria.   Musculoskeletal: Negative for arthralgias, joint swelling and neck pain.   Neurological: Positive for headaches. Negative for weakness.   Psychiatric/Behavioral: Negative for confusion and dysphoric mood.       Objective:      Temp 96 °F (35.6 °C)   Wt 97.5 kg (215 lb)   LMP 07/22/2020   BMI 41.99 kg/m²   Physical Exam    Assessment:       1. Menstrual migraine without status migrainosus, not intractable    2. Pseudotumor cerebri syndrome        Plan:   Sarah was seen today for headache.    Diagnoses and all orders for this visit:    Menstrual migraine without status migrainosus, not intractable  -     Ambulatory referral/consult to Neurology; Future  -     magnesium oxide (MAG-OX) 400 mg (241.3 mg magnesium) tablet; Take 1 tablet (400 mg total) by mouth once daily.  -     butalbital-acetaminophen-caffeine -40 mg (FIORICET, ESGIC) -40 mg per tablet; Take 1 tablet by mouth 2 (two) times daily as needed for Pain.    Pseudotumor cerebri syndrome  -     Ambulatory referral/consult to Neurology; Future    The patient location is: [place]  The chief complaint leading to consultation is: headaches    Visit type: audiovisual    Face to Face time with patient: 20 minutes of total time spent on the encounter, which includes face to face time and non-face to face time preparing to see the patient (eg, review of tests), Obtaining and/or reviewing separately obtained history, Documenting clinical information in the electronic or other health record, Independently interpreting results (not separately reported) and communicating results to the patient/family/caregiver, or Care coordination (not separately reported).         Each patient to whom he or she provides medical services by telemedicine is:  (1) informed of the relationship between  the physician and patient and the respective role of any other health care provider with respect to management of the patient; and (2) notified that he or she may decline to receive medical services by telemedicine and may withdraw from such care at any time.    Notes:

## 2020-08-18 ENCOUNTER — PATIENT MESSAGE (OUTPATIENT)
Dept: INTERNAL MEDICINE | Facility: CLINIC | Age: 40
End: 2020-08-18

## 2020-09-02 ENCOUNTER — OFFICE VISIT (OUTPATIENT)
Dept: OTOLARYNGOLOGY | Facility: CLINIC | Age: 40
End: 2020-09-02
Payer: COMMERCIAL

## 2020-09-02 VITALS — HEART RATE: 107 BPM | SYSTOLIC BLOOD PRESSURE: 157 MMHG | DIASTOLIC BLOOD PRESSURE: 95 MMHG

## 2020-09-02 DIAGNOSIS — J34.3 NASAL TURBINATE HYPERTROPHY: ICD-10-CM

## 2020-09-02 DIAGNOSIS — J01.91 ACUTE RECURRENT SINUSITIS, UNSPECIFIED LOCATION: ICD-10-CM

## 2020-09-02 DIAGNOSIS — D68.51 FACTOR 5 LEIDEN MUTATION, HETEROZYGOUS: ICD-10-CM

## 2020-09-02 DIAGNOSIS — Z01.812 PRE-PROCEDURE LAB EXAM: ICD-10-CM

## 2020-09-02 DIAGNOSIS — J31.0 CHRONIC RHINITIS: ICD-10-CM

## 2020-09-02 DIAGNOSIS — J34.2 DEVIATED NASAL SEPTUM: Primary | ICD-10-CM

## 2020-09-02 PROCEDURE — 99214 OFFICE O/P EST MOD 30 MIN: CPT | Mod: S$GLB,,, | Performed by: OTOLARYNGOLOGY

## 2020-09-02 PROCEDURE — 99214 PR OFFICE/OUTPT VISIT, EST, LEVL IV, 30-39 MIN: ICD-10-PCS | Mod: S$GLB,,, | Performed by: OTOLARYNGOLOGY

## 2020-09-02 PROCEDURE — 99999 PR PBB SHADOW E&M-EST. PATIENT-LVL III: CPT | Mod: PBBFAC,,, | Performed by: OTOLARYNGOLOGY

## 2020-09-02 PROCEDURE — 99999 PR PBB SHADOW E&M-EST. PATIENT-LVL III: ICD-10-PCS | Mod: PBBFAC,,, | Performed by: OTOLARYNGOLOGY

## 2020-09-02 NOTE — H&P (VIEW-ONLY)
Subjective:      Sarah is a 40 y.o. female who comes for follow-up of nasal obstruction.  Her last visit with me was on 6/10/2020.  No improvement, used flonase up to BID, also antihistamines and saline rinse daily, no improvement on bilateral moderately severe nasal blockage, greater on right side, with associated clear anterior and posterior mucus.  Had pneumovax shot, no recent sinus infections.    SNOT-22 score: : (P) 48  NOSE score:: (P) 80%  ETDQ-7 score:: (P) 2.1    The patient's medications, allergies, past medical, surgical, social and family histories were reviewed and updated as appropriate.    A detailed review of systems was obtained with pertinent positives as per the above HPI, and otherwise negative.        Objective:     BP (!) 157/95   Pulse 107        Constitutional:   She appears well-developed. She is cooperative.     Head:  Normocephalic.     Nose:  Septal deviation present. No mucosal edema, rhinorrhea or polyps. No epistaxis. Turbinate hypertrophy.  Turbinates normal and no turbinate masses.  Right sinus exhibits no maxillary sinus tenderness and no frontal sinus tenderness. Left sinus exhibits no maxillary sinus tenderness and no frontal sinus tenderness.   Rightward high septal deviation with maxillary crest spur    Mouth/Throat  Oropharynx clear and moist without lesions or asymmetry. No oropharyngeal exudate or posterior oropharyngeal erythema.     Neck:  No adenopathy. Normal range of motion present.     She has no cervical adenopathy.       Procedure    None        Data Reviewed    WBC (K/uL)   Date Value   02/04/2019 15.11 (H)     Eosinophil% (%)   Date Value   02/04/2019 1.1     Eos # (K/uL)   Date Value   02/04/2019 0.2     Platelets (K/uL)   Date Value   02/04/2019 320     Glucose (mg/dL)   Date Value   02/04/2019 112 (H)     No results found for: IGE        Assessment:     1. Deviated nasal septum    2. Nasal turbinate hypertrophy    3. Chronic rhinitis    4. Acute recurrent  sinusitis, unspecified location    5. Factor 5 Leiden mutation, heterozygous         Plan:     She would benefit from septoplasty and turbinate reduction for the treatment of her condition.  I discussed the risks, benefits and alternatives to surgery with the patient, as well as the expected postoperative course.  I gave her the opportunity to ask questions and I answered all of them.  I provided relevant printed information on her condition for her to review at home.  Same-day discharge is anticipated.  She may have anesthesia triage by telephone.   The surgery will be scheduled in the near future.  She will need to return for a postoperative visit 1 week after surgery.   Follow up for surgery.

## 2020-09-04 DIAGNOSIS — J34.2 DEVIATED NASAL SEPTUM: Primary | ICD-10-CM

## 2020-09-04 DIAGNOSIS — J01.91 ACUTE RECURRENT SINUSITIS, UNSPECIFIED LOCATION: ICD-10-CM

## 2020-09-04 DIAGNOSIS — J34.3 NASAL TURBINATE HYPERTROPHY: ICD-10-CM

## 2020-09-05 ENCOUNTER — LAB VISIT (OUTPATIENT)
Dept: LAB | Facility: HOSPITAL | Age: 40
End: 2020-09-05
Attending: FAMILY MEDICINE
Payer: COMMERCIAL

## 2020-09-05 DIAGNOSIS — Z13.6 ENCOUNTER FOR LIPID SCREENING FOR CARDIOVASCULAR DISEASE: ICD-10-CM

## 2020-09-05 DIAGNOSIS — Z00.00 ANNUAL PHYSICAL EXAM: ICD-10-CM

## 2020-09-05 DIAGNOSIS — E55.9 VITAMIN D DEFICIENCY: ICD-10-CM

## 2020-09-05 DIAGNOSIS — Z13.220 ENCOUNTER FOR LIPID SCREENING FOR CARDIOVASCULAR DISEASE: ICD-10-CM

## 2020-09-05 LAB
25(OH)D3+25(OH)D2 SERPL-MCNC: 20 NG/ML (ref 30–96)
ALBUMIN SERPL BCP-MCNC: 4 G/DL (ref 3.5–5.2)
ALP SERPL-CCNC: 89 U/L (ref 55–135)
ALT SERPL W/O P-5'-P-CCNC: 26 U/L (ref 10–44)
ANION GAP SERPL CALC-SCNC: 10 MMOL/L (ref 8–16)
AST SERPL-CCNC: 20 U/L (ref 10–40)
BASOPHILS # BLD AUTO: 0.04 K/UL (ref 0–0.2)
BASOPHILS NFR BLD: 0.5 % (ref 0–1.9)
BILIRUB SERPL-MCNC: 0.3 MG/DL (ref 0.1–1)
BUN SERPL-MCNC: 12 MG/DL (ref 6–20)
CALCIUM SERPL-MCNC: 9 MG/DL (ref 8.7–10.5)
CHLORIDE SERPL-SCNC: 111 MMOL/L (ref 95–110)
CHOLEST SERPL-MCNC: 195 MG/DL (ref 120–199)
CHOLEST/HDLC SERPL: 3.9 {RATIO} (ref 2–5)
CO2 SERPL-SCNC: 19 MMOL/L (ref 23–29)
CREAT SERPL-MCNC: 1 MG/DL (ref 0.5–1.4)
DIFFERENTIAL METHOD: ABNORMAL
EOSINOPHIL # BLD AUTO: 0.2 K/UL (ref 0–0.5)
EOSINOPHIL NFR BLD: 2.6 % (ref 0–8)
ERYTHROCYTE [DISTWIDTH] IN BLOOD BY AUTOMATED COUNT: 13.2 % (ref 11.5–14.5)
EST. GFR  (AFRICAN AMERICAN): >60 ML/MIN/1.73 M^2
EST. GFR  (NON AFRICAN AMERICAN): >60 ML/MIN/1.73 M^2
ESTIMATED AVG GLUCOSE: 114 MG/DL (ref 68–131)
GLUCOSE SERPL-MCNC: 112 MG/DL (ref 70–110)
HBA1C MFR BLD HPLC: 5.6 % (ref 4–5.6)
HCT VFR BLD AUTO: 41.6 % (ref 37–48.5)
HDLC SERPL-MCNC: 50 MG/DL (ref 40–75)
HDLC SERPL: 25.6 % (ref 20–50)
HGB BLD-MCNC: 13.2 G/DL (ref 12–16)
IMM GRANULOCYTES # BLD AUTO: 0.02 K/UL (ref 0–0.04)
IMM GRANULOCYTES NFR BLD AUTO: 0.2 % (ref 0–0.5)
LDLC SERPL CALC-MCNC: 123.6 MG/DL (ref 63–159)
LYMPHOCYTES # BLD AUTO: 2.2 K/UL (ref 1–4.8)
LYMPHOCYTES NFR BLD: 27.1 % (ref 18–48)
MCH RBC QN AUTO: 29.4 PG (ref 27–31)
MCHC RBC AUTO-ENTMCNC: 31.7 G/DL (ref 32–36)
MCV RBC AUTO: 93 FL (ref 82–98)
MONOCYTES # BLD AUTO: 0.4 K/UL (ref 0.3–1)
MONOCYTES NFR BLD: 5.3 % (ref 4–15)
NEUTROPHILS # BLD AUTO: 5.2 K/UL (ref 1.8–7.7)
NEUTROPHILS NFR BLD: 64.3 % (ref 38–73)
NONHDLC SERPL-MCNC: 145 MG/DL
NRBC BLD-RTO: 0 /100 WBC
PLATELET # BLD AUTO: 300 K/UL (ref 150–350)
PMV BLD AUTO: 10.3 FL (ref 9.2–12.9)
POTASSIUM SERPL-SCNC: 3.9 MMOL/L (ref 3.5–5.1)
PROT SERPL-MCNC: 7.1 G/DL (ref 6–8.4)
RBC # BLD AUTO: 4.49 M/UL (ref 4–5.4)
SODIUM SERPL-SCNC: 140 MMOL/L (ref 136–145)
TRIGL SERPL-MCNC: 107 MG/DL (ref 30–150)
TSH SERPL DL<=0.005 MIU/L-ACNC: 1.62 UIU/ML (ref 0.4–4)
WBC # BLD AUTO: 8.15 K/UL (ref 3.9–12.7)

## 2020-09-05 PROCEDURE — 80053 COMPREHEN METABOLIC PANEL: CPT

## 2020-09-05 PROCEDURE — 82306 VITAMIN D 25 HYDROXY: CPT

## 2020-09-05 PROCEDURE — 85025 COMPLETE CBC W/AUTO DIFF WBC: CPT

## 2020-09-05 PROCEDURE — 84443 ASSAY THYROID STIM HORMONE: CPT

## 2020-09-05 PROCEDURE — 80061 LIPID PANEL: CPT

## 2020-09-05 PROCEDURE — 83036 HEMOGLOBIN GLYCOSYLATED A1C: CPT

## 2020-09-05 PROCEDURE — 36415 COLL VENOUS BLD VENIPUNCTURE: CPT

## 2020-09-22 ENCOUNTER — LAB VISIT (OUTPATIENT)
Dept: SPORTS MEDICINE | Facility: CLINIC | Age: 40
End: 2020-09-22
Payer: COMMERCIAL

## 2020-09-22 DIAGNOSIS — Z01.812 PRE-PROCEDURE LAB EXAM: ICD-10-CM

## 2020-09-22 PROCEDURE — U0003 INFECTIOUS AGENT DETECTION BY NUCLEIC ACID (DNA OR RNA); SEVERE ACUTE RESPIRATORY SYNDROME CORONAVIRUS 2 (SARS-COV-2) (CORONAVIRUS DISEASE [COVID-19]), AMPLIFIED PROBE TECHNIQUE, MAKING USE OF HIGH THROUGHPUT TECHNOLOGIES AS DESCRIBED BY CMS-2020-01-R: HCPCS

## 2020-09-23 ENCOUNTER — TELEPHONE (OUTPATIENT)
Dept: OTOLARYNGOLOGY | Facility: CLINIC | Age: 40
End: 2020-09-23

## 2020-09-23 ENCOUNTER — PATIENT MESSAGE (OUTPATIENT)
Dept: SURGERY | Facility: HOSPITAL | Age: 40
End: 2020-09-23

## 2020-09-23 LAB — SARS-COV-2 RNA RESP QL NAA+PROBE: NOT DETECTED

## 2020-09-24 ENCOUNTER — ANESTHESIA EVENT (OUTPATIENT)
Dept: SURGERY | Facility: HOSPITAL | Age: 40
End: 2020-09-24
Payer: COMMERCIAL

## 2020-09-24 ENCOUNTER — PATIENT MESSAGE (OUTPATIENT)
Dept: SURGERY | Facility: HOSPITAL | Age: 40
End: 2020-09-24

## 2020-09-24 NOTE — ANESTHESIA PREPROCEDURE EVALUATION
[facility]  Anesthesia Pre-Operative Evaluation         Patient Name: Sarah Ulloa  YOB: 1980  MRN: [id number]    SUBJECTIVE:     Pre-operative evaluation for Procedure(s) (LRB):  SEPTOPLASTY, NOSE (N/A)  REDUCTION, NASAL TURBINATE (Bilateral)     09/24/2020    Sarah Ulloa is a 40 y.o. female w/ a significant PMHx of PONV, hiatal hernia s/p nissen fundoplication, hypothyroidism, factor V leiden mutation, obesity [BMI 41.99] cervical fusion [2016].  Pt with turbinate hypertrophy and septal deviation causing nasal congestion.    Patient now presents for the above procedure(s).    Prev airway: Easy mask ventilation, Direct laryngoscopy with Mac #3, grade I view, no complications       Patient Active Problem List   Diagnosis    BMI 40.0-44.9, adult    Clotting disorder    Thyroid nodule    Chronic left-sided low back pain without sciatica    Bipolar 1 disorder    S/P Nissen fundoplication (without gastrostomy tube) procedure    Factor 5 Leiden mutation, heterozygous    Migraine without aura and without status migrainosus, not intractable    Iron deficiency    Vitamin D deficiency    Prediabetes    Pseudotumor cerebri syndrome    Benign intracranial hypertension    Headache, post-lumbar puncture    History of hypercoagulable state    RUQ abdominal pain       Review of patient's allergies indicates:   Allergen Reactions    Percocet [oxycodone-acetaminophen] Nausea And Vomiting    Sulfa (sulfonamide antibiotics) Rash    Aspirin     Sulfadiazine     Phentermine Rash       Current Inpatient Medications:      No current facility-administered medications on file prior to encounter.      Current Outpatient Medications on File Prior to Encounter   Medication Sig Dispense Refill    acetaZOLAMIDE (DIAMOX) 250 MG tablet Take 1 tablet (250 mg total) by mouth 3 (three) times daily. (Patient taking differently: Take 250 mg by mouth once daily. ) 90 tablet 10     buPROPion (WELLBUTRIN SR) 200 MG TbSR 200 mg once daily.       diclofenac sodium (VOLTAREN) 1 % Gel Apply 2 grams topically 4 (four) times daily. (Patient taking differently: Apply 2 g topically as needed. ) 100 g 0    lamotrigine (LAMICTAL) 150 MG Tab 150 mg once daily.       magnesium oxide (MAG-OX) 400 mg (241.3 mg magnesium) tablet Take 1 tablet (400 mg total) by mouth once daily. 90 tablet 1    norethindrone (MICRONOR) 0.35 mg tablet Take by mouth once daily.       triamcinolone acetonide 0.1% (KENALOG) 0.1 % paste Place onto teeth 2 (two) times daily. (Patient taking differently: Place onto teeth as needed. ) 15 g 0    TRINTELLIX 10 mg Tab 10 mg once daily.         Past Surgical History:   Procedure Laterality Date    ANTERIOR CERVICAL DISCECTOMY W/ FUSION      ESOPHAGOGASTRODUODENOSCOPY      LAPAROSCOPIC APPENDECTOMY N/A 2019    Procedure: APPENDECTOMY, LAPAROSCOPIC;  Surgeon: Maikol Banks Jr., MD;  Location: [facility];  Service: General;  Laterality: N/A;    Laparoscopic hh with Toupet      TONSILLECTOMY         Social History     Socioeconomic History    Marital status: Single     Spouse name: Not on file    Number of children: 1    Years of education: Not on file    Highest education level: Not on file   Occupational History    Occupation: HR supervisor   Social Needs    Financial resource strain: Somewhat hard    Food insecurity     Worry: Never true     Inability: Never true    Transportation needs     Medical: No     Non-medical: No   Tobacco Use    Smoking status: Former Smoker     Packs/day: 0.25     Years: 10.00     Pack years: 2.50     Types: Cigarettes     Quit date: 2012     Years since quittin.6    Smokeless tobacco: Never Used    Tobacco comment: quit smoking    Substance and Sexual Activity    Alcohol use: No     Frequency: Monthly or less     Drinks per session: 1 or 2     Binge frequency: Never    Drug use: No    Sexual activity: Not on  file   Lifestyle    Physical activity     Days per week: 3 days     Minutes per session: 60 min    Stress: Only a little   Relationships    Social connections     Talks on phone: More than three times a week     Gets together: Twice a week     Attends Jew service: Not on file     Active member of club or organization: No     Attends meetings of clubs or organizations: Patient refused     Relationship status: Never    Other Topics Concern    Not on file   Social History Narrative    Not on file       OBJECTIVE:     Vital Signs Range (Last 24H):         Significant Labs:  Lab Results   Component Value Date    WBC 8.15 09/05/2020    HGB 13.2 09/05/2020    HCT 41.6 09/05/2020     09/05/2020    CHOL 195 09/05/2020    TRIG 107 09/05/2020    HDL 50 09/05/2020    ALT 26 09/05/2020    AST 20 09/05/2020     09/05/2020    K 3.9 09/05/2020     (H) 09/05/2020    CREATININE 1.0 09/05/2020    BUN 12 09/05/2020    CO2 19 (L) 09/05/2020    TSH 1.618 09/05/2020    INR 1.0 08/16/2007    HGBA1C 5.6 09/05/2020       Diagnostic Studies: No relevant studies.    EKG:   No results found for this or any previous visit.    2D ECHO:  TTE:  No results found for this or any previous visit.    JOSE:  No results found for this or any previous visit.    ASSESSMENT/PLAN:       Anesthesia Evaluation    I have reviewed the Patient Summary Reports.    I have reviewed the Nursing Notes.       Review of Systems  Anesthesia Hx:  Hx of Anesthetic complications  Denies Family Hx of Anesthesia complications.  Personal Hx of Anesthesia complications, Post-Operative Nausea/Vomiting   Social:  Non-Smoker    Hematology/Oncology:     Oncology Normal    -- Denies Anemia:   EENT/Dental:EENT/Dental Normal   Cardiovascular:   Exercise tolerance: good Denies Pacemaker.  Denies Valvular problems/Murmurs.  Denies CABG/stent.     Pulmonary:  Pulmonary Normal    Renal/:  Renal/ Normal     Hepatic/GI:   GERD    Musculoskeletal:   Hx  of cervical fusion   Neurological:  Neurology Normal    Endocrine:   Hypothyroidism: hx of nissen.    Dermatological:  Skin Normal    Psych:  Psychiatric Normal  Bipolar disorder         Physical Exam  General:  Obesity    Airway/Jaw/Neck:  Airway Findings: Mouth Opening: Normal Tongue: Normal  General Airway Assessment: Adult  Mallampati: III  Improves to III with phonation.  Jaw/Neck Findings:  Neck ROM: Normal ROM     Eyes/Ears/Nose:  EYES/EARS/NOSE FINDINGS: Normal   Dental:  Dental Findings: In tact   Chest/Lungs:  Chest/Lungs Findings: Normal Respiratory Rate     Heart/Vascular:  Heart Findings: Rate: Normal     Abdomen:  Abdomen Findings:  obesity   Musculoskeletal:  Musculoskeletal Findings: Normal   Skin:  Skin Findings: Normal    Mental Status:  Mental Status Findings:  Cooperative, Alert and Oriented         Anesthesia Plan  Type of Anesthesia, risks & benefits discussed:  Anesthesia Type:  general  Patient's Preference:   Intra-op Monitoring Plan: standard ASA monitors  Intra-op Monitoring Plan Comments:   Post Op Pain Control Plan: per primary service following discharge from PACU, IV/PO Opioids PRN and multimodal analgesia  Post Op Pain Control Plan Comments:   Induction:   IV  Beta Blocker:  Patient is not currently on a Beta-Blocker (No further documentation required).       Informed Consent: Patient understands risks and agrees with Anesthesia plan.  Questions answered. Anesthesia consent signed with patient.  ASA Score: 3     Day of Surgery Review of History & Physical:    H&P update referred to the surgeon.         Ready For Surgery From Anesthesia Perspective.

## 2020-09-25 ENCOUNTER — ANESTHESIA (OUTPATIENT)
Dept: SURGERY | Facility: HOSPITAL | Age: 40
End: 2020-09-25
Payer: COMMERCIAL

## 2020-09-25 ENCOUNTER — HOSPITAL ENCOUNTER (OUTPATIENT)
Facility: HOSPITAL | Age: 40
Discharge: HOME | End: 2020-09-25
Attending: OTOLARYNGOLOGY | Admitting: OTOLARYNGOLOGY
Payer: COMMERCIAL

## 2020-09-25 VITALS
TEMPERATURE: 99 F | SYSTOLIC BLOOD PRESSURE: 133 MMHG | OXYGEN SATURATION: 95 % | WEIGHT: 215 LBS | HEIGHT: 60 IN | BODY MASS INDEX: 42.21 KG/M2 | DIASTOLIC BLOOD PRESSURE: 83 MMHG | HEART RATE: 76 BPM | RESPIRATION RATE: 17 BRPM

## 2020-09-25 DIAGNOSIS — J34.89 NASAL OBSTRUCTION: Primary | ICD-10-CM

## 2020-09-25 LAB
B-HCG UR QL: NEGATIVE
CTP QC/QA: YES

## 2020-09-25 PROCEDURE — 30520 REPAIR OF NASAL SEPTUM: CPT | Mod: ,,, | Performed by: OTOLARYNGOLOGY

## 2020-09-25 PROCEDURE — D9220A PRA ANESTHESIA: Mod: ,,, | Performed by: ANESTHESIOLOGY

## 2020-09-25 PROCEDURE — 25000003 PHARM REV CODE 250: Performed by: STUDENT IN AN ORGANIZED HEALTH CARE EDUCATION/TRAINING PROGRAM

## 2020-09-25 PROCEDURE — 30140 PR EXCISION TURBINATE,SUBMUCOUS: ICD-10-PCS | Mod: 50,51,, | Performed by: OTOLARYNGOLOGY

## 2020-09-25 PROCEDURE — 30520 PR REPAIR, NASAL SEPTUM: ICD-10-PCS | Mod: ,,, | Performed by: OTOLARYNGOLOGY

## 2020-09-25 PROCEDURE — 63600175 PHARM REV CODE 636 W HCPCS: Performed by: OTOLARYNGOLOGY

## 2020-09-25 PROCEDURE — 63600175 PHARM REV CODE 636 W HCPCS: Performed by: STUDENT IN AN ORGANIZED HEALTH CARE EDUCATION/TRAINING PROGRAM

## 2020-09-25 PROCEDURE — 71000016 HC POSTOP RECOV ADDL HR: Performed by: OTOLARYNGOLOGY

## 2020-09-25 PROCEDURE — 25000003 PHARM REV CODE 250: Performed by: OTOLARYNGOLOGY

## 2020-09-25 PROCEDURE — C1894 INTRO/SHEATH, NON-LASER: HCPCS | Performed by: OTOLARYNGOLOGY

## 2020-09-25 PROCEDURE — 71000044 HC DOSC ROUTINE RECOVERY FIRST HOUR: Performed by: OTOLARYNGOLOGY

## 2020-09-25 PROCEDURE — 81025 URINE PREGNANCY TEST: CPT | Performed by: OTOLARYNGOLOGY

## 2020-09-25 PROCEDURE — 36000706: Performed by: OTOLARYNGOLOGY

## 2020-09-25 PROCEDURE — 30140 RESECT INFERIOR TURBINATE: CPT | Mod: 50,51,, | Performed by: OTOLARYNGOLOGY

## 2020-09-25 PROCEDURE — D9220A PRA ANESTHESIA: ICD-10-PCS | Mod: ,,, | Performed by: ANESTHESIOLOGY

## 2020-09-25 PROCEDURE — 27201423 OPTIME MED/SURG SUP & DEVICES STERILE SUPPLY: Performed by: OTOLARYNGOLOGY

## 2020-09-25 PROCEDURE — 71000033 HC RECOVERY, INTIAL HOUR: Performed by: OTOLARYNGOLOGY

## 2020-09-25 PROCEDURE — 37000009 HC ANESTHESIA EA ADD 15 MINS: Performed by: OTOLARYNGOLOGY

## 2020-09-25 PROCEDURE — 37000008 HC ANESTHESIA 1ST 15 MINUTES: Performed by: OTOLARYNGOLOGY

## 2020-09-25 PROCEDURE — 36000707: Performed by: OTOLARYNGOLOGY

## 2020-09-25 PROCEDURE — 71000015 HC POSTOP RECOV 1ST HR: Performed by: OTOLARYNGOLOGY

## 2020-09-25 RX ORDER — ROCURONIUM BROMIDE 10 MG/ML
INJECTION, SOLUTION INTRAVENOUS
Status: DISCONTINUED | OUTPATIENT
Start: 2020-09-25 | End: 2020-09-25

## 2020-09-25 RX ORDER — LIDOCAINE HCL/PF 100 MG/5ML
SYRINGE (ML) INTRAVENOUS
Status: DISCONTINUED | OUTPATIENT
Start: 2020-09-25 | End: 2020-09-25

## 2020-09-25 RX ORDER — PROPOFOL 10 MG/ML
VIAL (ML) INTRAVENOUS CONTINUOUS PRN
Status: DISCONTINUED | OUTPATIENT
Start: 2020-09-25 | End: 2020-09-25

## 2020-09-25 RX ORDER — NEOSTIGMINE METHYLSULFATE 1 MG/ML
INJECTION, SOLUTION INTRAVENOUS
Status: DISCONTINUED | OUTPATIENT
Start: 2020-09-25 | End: 2020-09-25

## 2020-09-25 RX ORDER — LIDOCAINE HYDROCHLORIDE AND EPINEPHRINE 10; 10 MG/ML; UG/ML
INJECTION, SOLUTION INFILTRATION; PERINEURAL
Status: DISCONTINUED | OUTPATIENT
Start: 2020-09-25 | End: 2020-09-25 | Stop reason: HOSPADM

## 2020-09-25 RX ORDER — FENTANYL CITRATE 50 UG/ML
25 INJECTION, SOLUTION INTRAMUSCULAR; INTRAVENOUS
Status: DISCONTINUED | OUTPATIENT
Start: 2020-09-25 | End: 2020-09-25 | Stop reason: HOSPADM

## 2020-09-25 RX ORDER — PHENYLEPHRINE HYDROCHLORIDE 10 MG/ML
INJECTION INTRAVENOUS
Status: DISCONTINUED | OUTPATIENT
Start: 2020-09-25 | End: 2020-09-25

## 2020-09-25 RX ORDER — DEXAMETHASONE SODIUM PHOSPHATE 4 MG/ML
INJECTION, SOLUTION INTRA-ARTICULAR; INTRALESIONAL; INTRAMUSCULAR; INTRAVENOUS; SOFT TISSUE
Status: DISCONTINUED | OUTPATIENT
Start: 2020-09-25 | End: 2020-09-25

## 2020-09-25 RX ORDER — EPINEPHRINE 1 MG/ML
INJECTION INTRAMUSCULAR; INTRAVENOUS; SUBCUTANEOUS
Status: DISCONTINUED | OUTPATIENT
Start: 2020-09-25 | End: 2020-09-25 | Stop reason: HOSPADM

## 2020-09-25 RX ORDER — ACETAMINOPHEN 10 MG/ML
1000 INJECTION, SOLUTION INTRAVENOUS ONCE
Status: DISCONTINUED | OUTPATIENT
Start: 2020-09-25 | End: 2020-09-25

## 2020-09-25 RX ORDER — CEFAZOLIN SODIUM 1 G/3ML
INJECTION, POWDER, FOR SOLUTION INTRAMUSCULAR; INTRAVENOUS
Status: DISCONTINUED | OUTPATIENT
Start: 2020-09-25 | End: 2020-09-25

## 2020-09-25 RX ORDER — MIDAZOLAM HYDROCHLORIDE 1 MG/ML
INJECTION, SOLUTION INTRAMUSCULAR; INTRAVENOUS
Status: DISCONTINUED | OUTPATIENT
Start: 2020-09-25 | End: 2020-09-25

## 2020-09-25 RX ORDER — IBUPROFEN 200 MG
800 TABLET ORAL EVERY 6 HOURS PRN
Status: DISCONTINUED | OUTPATIENT
Start: 2020-09-25 | End: 2020-09-25 | Stop reason: HOSPADM

## 2020-09-25 RX ORDER — ONDANSETRON 4 MG/1
4 TABLET, FILM COATED ORAL EVERY 8 HOURS PRN
Qty: 25 TABLET | Refills: 0 | Status: SHIPPED | OUTPATIENT
Start: 2020-09-25 | End: 2021-02-15 | Stop reason: SDUPTHER

## 2020-09-25 RX ORDER — ONDANSETRON 8 MG/1
8 TABLET, ORALLY DISINTEGRATING ORAL EVERY 8 HOURS PRN
Status: DISCONTINUED | OUTPATIENT
Start: 2020-09-25 | End: 2020-09-25 | Stop reason: HOSPADM

## 2020-09-25 RX ORDER — HYDROMORPHONE HYDROCHLORIDE 1 MG/ML
0.2 INJECTION, SOLUTION INTRAMUSCULAR; INTRAVENOUS; SUBCUTANEOUS EVERY 5 MIN PRN
Status: DISCONTINUED | OUTPATIENT
Start: 2020-09-25 | End: 2020-09-25 | Stop reason: HOSPADM

## 2020-09-25 RX ORDER — ONDANSETRON 2 MG/ML
4 INJECTION INTRAMUSCULAR; INTRAVENOUS DAILY PRN
Status: DISCONTINUED | OUTPATIENT
Start: 2020-09-25 | End: 2020-09-25 | Stop reason: HOSPADM

## 2020-09-25 RX ORDER — PROPOFOL 10 MG/ML
VIAL (ML) INTRAVENOUS
Status: DISCONTINUED | OUTPATIENT
Start: 2020-09-25 | End: 2020-09-25

## 2020-09-25 RX ORDER — PROCHLORPERAZINE EDISYLATE 5 MG/ML
2.5 INJECTION INTRAMUSCULAR; INTRAVENOUS EVERY 6 HOURS PRN
Status: DISCONTINUED | OUTPATIENT
Start: 2020-09-25 | End: 2020-09-25 | Stop reason: HOSPADM

## 2020-09-25 RX ORDER — SODIUM CHLORIDE 9 MG/ML
INJECTION, SOLUTION INTRAVENOUS CONTINUOUS PRN
Status: DISCONTINUED | OUTPATIENT
Start: 2020-09-25 | End: 2020-09-25

## 2020-09-25 RX ORDER — SODIUM CHLORIDE 0.9 % (FLUSH) 0.9 %
10 SYRINGE (ML) INJECTION
Status: DISCONTINUED | OUTPATIENT
Start: 2020-09-25 | End: 2020-09-25 | Stop reason: HOSPADM

## 2020-09-25 RX ORDER — IBUPROFEN 600 MG/1
600 TABLET ORAL EVERY 6 HOURS PRN
COMMUNITY
Start: 2020-09-25 | End: 2021-09-16

## 2020-09-25 RX ORDER — FENTANYL CITRATE 50 UG/ML
INJECTION, SOLUTION INTRAMUSCULAR; INTRAVENOUS
Status: DISCONTINUED | OUTPATIENT
Start: 2020-09-25 | End: 2020-09-25

## 2020-09-25 RX ORDER — ACETAMINOPHEN 325 MG/1
650 TABLET ORAL EVERY 6 HOURS PRN
Status: DISCONTINUED | OUTPATIENT
Start: 2020-09-25 | End: 2020-09-25 | Stop reason: HOSPADM

## 2020-09-25 RX ORDER — ONDANSETRON 2 MG/ML
INJECTION INTRAMUSCULAR; INTRAVENOUS
Status: DISCONTINUED | OUTPATIENT
Start: 2020-09-25 | End: 2020-09-25

## 2020-09-25 RX ADMIN — LIDOCAINE HYDROCHLORIDE 70 MG: 20 INJECTION, SOLUTION INTRAVENOUS at 07:09

## 2020-09-25 RX ADMIN — HYDROMORPHONE HYDROCHLORIDE 0.2 MG: 1 INJECTION, SOLUTION INTRAMUSCULAR; INTRAVENOUS; SUBCUTANEOUS at 09:09

## 2020-09-25 RX ADMIN — PROPOFOL 200 MG: 10 INJECTION, EMULSION INTRAVENOUS at 07:09

## 2020-09-25 RX ADMIN — ACETAMINOPHEN 650 MG: 325 TABLET ORAL at 10:09

## 2020-09-25 RX ADMIN — IBUPROFEN 800 MG: 200 TABLET, FILM COATED ORAL at 09:09

## 2020-09-25 RX ADMIN — REMIFENTANIL HYDROCHLORIDE 0.1 MCG/KG/MIN: 1 INJECTION, POWDER, LYOPHILIZED, FOR SOLUTION INTRAVENOUS at 07:09

## 2020-09-25 RX ADMIN — FENTANYL CITRATE 25 MCG: 50 INJECTION INTRAMUSCULAR; INTRAVENOUS at 11:09

## 2020-09-25 RX ADMIN — ROCURONIUM BROMIDE 40 MG: 10 INJECTION, SOLUTION INTRAVENOUS at 07:09

## 2020-09-25 RX ADMIN — FENTANYL CITRATE 100 MCG: 50 INJECTION, SOLUTION INTRAMUSCULAR; INTRAVENOUS at 07:09

## 2020-09-25 RX ADMIN — SODIUM CHLORIDE: 0.9 INJECTION, SOLUTION INTRAVENOUS at 07:09

## 2020-09-25 RX ADMIN — PROPOFOL 100 MG: 10 INJECTION, EMULSION INTRAVENOUS at 07:09

## 2020-09-25 RX ADMIN — HYDROMORPHONE HYDROCHLORIDE 0.2 MG: 1 INJECTION, SOLUTION INTRAMUSCULAR; INTRAVENOUS; SUBCUTANEOUS at 10:09

## 2020-09-25 RX ADMIN — ONDANSETRON 4 MG: 2 INJECTION INTRAMUSCULAR; INTRAVENOUS at 09:09

## 2020-09-25 RX ADMIN — FENTANYL CITRATE 25 MCG: 50 INJECTION INTRAMUSCULAR; INTRAVENOUS at 12:09

## 2020-09-25 RX ADMIN — ONDANSETRON 4 MG: 2 INJECTION, SOLUTION INTRAMUSCULAR; INTRAVENOUS at 09:09

## 2020-09-25 RX ADMIN — DEXAMETHASONE SODIUM PHOSPHATE 4 MG: 4 INJECTION, SOLUTION INTRAMUSCULAR; INTRAVENOUS at 08:09

## 2020-09-25 RX ADMIN — CEFAZOLIN 2 G: 330 INJECTION, POWDER, FOR SOLUTION INTRAMUSCULAR; INTRAVENOUS at 08:09

## 2020-09-25 RX ADMIN — PHENYLEPHRINE HYDROCHLORIDE 100 MCG: 10 INJECTION INTRAVENOUS at 07:09

## 2020-09-25 RX ADMIN — PROCHLORPERAZINE EDISYLATE 2.5 MG: 5 INJECTION INTRAMUSCULAR; INTRAVENOUS at 11:09

## 2020-09-25 RX ADMIN — PROPOFOL 150 MCG/KG/MIN: 10 INJECTION, EMULSION INTRAVENOUS at 07:09

## 2020-09-25 RX ADMIN — NEOSTIGMINE METHYLSULFATE 3 MG: 1 INJECTION INTRAVENOUS at 09:09

## 2020-09-25 RX ADMIN — MIDAZOLAM HYDROCHLORIDE 2 MG: 1 INJECTION, SOLUTION INTRAMUSCULAR; INTRAVENOUS at 07:09

## 2020-09-25 RX ADMIN — PHENYLEPHRINE HYDROCHLORIDE 100 MCG: 10 INJECTION INTRAVENOUS at 08:09

## 2020-09-25 NOTE — OP NOTE
DATE OF OPERATION: 9/25/2020    SURGEON:  Mike Alexander MD     ASSISTANT SURGEON:  Brandon Ruggiero MD     OPERATION:     1. Endoscopic septoplasty.  2. Bilateral inferior turbinate reduction with submucosal resection.  3. Nasal endoscopy.     PREOPERATIVE DIAGNOSIS:      1. Deviated nasal septum.  2. Hypertrophic turbinates.        POSTOPERATIVE DIAGNOSIS:      1. Deviated nasal septum.  2. Hypertrophic turbinates.        ANESTHESIA: General.     COMPLICATIONS: None.     ESTIMATED BLOOD LOSS: 10 mL     SPECIMEN: None.     WOUND EXPECTANCY: Clean-contaminated.     FINDINGS: Rightward septal deviation.  Compound inferior turbinate hypertrophy.     INDICATIONS: Anatomic nasal obstruction, not improved with medical therapy.     I discussed the risks, benefits and alternatives of surgical correction of the septal deviation and turbinate hypertrophy with the patient as well as the expected postoperative course. I gave her the opportunity to ask questions and I answered all of them. On the morning of surgery I again met with the patient and reviewed the indications for surgery and she consented to proceed.     DESCRIPTION OF PROCEDURE: The patient was brought to the operating room and placed supine on the operating table. The patient was placed under general anesthesia and intubated. The patient was positioned with a donut under the head.  Cottonoid pledgets soaked with 4% cocaine were placed into the nasal cavity bilaterally for mucosal decongestion. The mucosa of the nasal septum was injected with 1% lidocaine with 1:100,000 parts epinephrine.  A time-out was performed to confirm the proper patient, site and procedure. The patient was prepped and draped in the usual fashion.     Surgery began with performance of submucous resection of the nasal septum. This began with additional injections, assisted by a 0-degree endoscope. Then, a hemitransfixion incision was made using a #15 blade on the left side. The  submucoperichondrial plane was identified and this was elevated using a [place] elevator. This plane was carried posteriorly to the bony-cartilaginous junction.  A Winston elevator was used to incise the cartilage at the junction and a contralateral mucoperiosteal flap was elevated. Then, using a 0-degree endoscope, the septal pocket was visualized and there was an additional long process of cartilage along the floor causing a deviation. This was resected using a Nery elevator and was removed.       Additional elevation of the flaps over the vomer revealed a large spur that was jutting into the middle meatus. This portion of the bone was resected top and bottom using a Anselmo scissors and the spur was removed using a Prabhu forceps. After removal, there was a small perforation on the right side of the mucoperichondrial flap, but the contralateral flap remained intact. At this point, 10,000 units of topical thrombin mixed with epinephrine concentrated 1:1000 parts was applied to pledgets and placed between the flaps for topical hemostasis. After these pledgets were removed, there was excellent hemostasis at the septal site.       Then, under endoscopic visualization, a transseptal quilting suture of 4-0 plain gut was used to reapproximate the nasal septal flaps. Then the hemitransfixion incision was closed using 4-0 chromic gut suture in figure-of-eight fashion times two.  Repeated nasal endoscopy at this point revealed marked improvement of the septal deviation and good visualization of the vertical suspension of both middle turbinates.    Attention was then turned to the turbinates.  Additional injections were performed around the inferior turbinates and the sphenopalatine ganglion region bilaterally.  Incisions were made in the anterior head of the inferior turbinate using a #6400 blade.  A Winston elevator was then tunnelled medially to the turbinate bone and used to segmentally outfracture and morselize the  bone.  Then, a 2 mm blade on the powered tissue shaver was tunneled submucosally and used to resect soft tissue of the turbinate while overlying mucosa was preserved.   Finally, the turbinates were outfractured using a Silva/Boies elevator.  An identical procedure was performed bilaterally.    At the conclusion of the procedure there was good hemostasis.  Mupirocin ointment was applied to the vestibule bilaterally.  A nasal dressing was applied and the drapes were taken down.  The patient was then turned back toward the anesthesiologist and awakened from anesthesia, extubated and transferred to the recovery room in stable condition.

## 2020-09-25 NOTE — TRANSFER OF CARE
Anesthesia Transfer of Care Note    Patient: Sarah Ulloa    Procedure(s) Performed: Procedure(s) (LRB):  SEPTOPLASTY, NOSE (N/A)  REDUCTION, NASAL TURBINATE (Bilateral)    Patient location: PACU    Anesthesia Type: general    Transport from OR: Transported from OR on 6-10 L/min O2 by face mask with adequate spontaneous ventilation    Post pain: adequate analgesia    Post assessment: tolerated procedure well    Post vital signs: stable    Level of consciousness: awake    Nausea/Vomiting: nausea    Transfer of care protocol was followed      Last vitals:   Visit Vitals  /83 (BP Location: Right arm, Patient Position: Lying)   Pulse 86   Temp 36.8 °C (98.3 °F) (Oral)   Resp 18   Ht 5' (1.524 m)   Wt 97.5 kg (215 lb)   LMP  (Within Weeks)   SpO2 98%   Breastfeeding No   BMI 41.99 kg/m²

## 2020-09-25 NOTE — BRIEF OP NOTE
[facility]  Brief Operative Note    Surgery Date: 9/25/2020     Surgeon(s) and Role:     * Mike Alexander MD - Primary    Assisting Surgeon: None    Pre-op Diagnosis:  Deviated nasal septum [J34.2]  Nasal turbinate hypertrophy [J34.3]  Acute recurrent sinusitis, unspecified location [J01.91]    Post-op Diagnosis:  Post-Op Diagnosis Codes:     * Deviated nasal septum [J34.2]     * Nasal turbinate hypertrophy [J34.3]     * Acute recurrent sinusitis, unspecified location [J01.91]    Procedure(s) (LRB):  SEPTOPLASTY, NOSE (N/A)  REDUCTION, NASAL TURBINATE (Bilateral)    Anesthesia: General    Description of the findings of the procedure(s): Right deviation of the superior bony septum. Small right caudal deviation. Enlarged inferior turbinates bilaterally.     Estimated Blood Loss: * No values recorded between 9/25/2020  8:06 AM and 9/25/2020  9:25 AM *         Specimens:   Specimen (12h ago, onward)    None            Discharge Note    OUTCOME: Patient tolerated treatment/procedure well without complication and is now ready for discharge.    DISPOSITION: Home or Self Care    FINAL DIAGNOSIS:  Nasal Obstruction    FOLLOWUP: In clinic    DISCHARGE INSTRUCTIONS:    Discharge Procedure Orders   Diet Adult Regular     Notify your health care provider if you experience any of the following:  temperature >100.4     Notify your health care provider if you experience any of the following:  persistent nausea and vomiting or diarrhea     Notify your health care provider if you experience any of the following:  severe uncontrolled pain     Notify your health care provider if you experience any of the following:  redness, tenderness, or signs of infection (pain, swelling, redness, odor or green/yellow discharge around incision site)     Notify your health care provider if you experience any of the following:  difficulty breathing or increased cough     Change dressing (specify)   Order Comments: Change  mustache dressing as needed for nasal drainage     Activity as tolerated

## 2020-09-25 NOTE — ANESTHESIA POSTPROCEDURE EVALUATION
Anesthesia Post Evaluation    Patient: Sarah Ulloa    Procedure(s) Performed: Procedure(s) (LRB):  SEPTOPLASTY, NOSE (N/A)  REDUCTION, NASAL TURBINATE (Bilateral)    Final Anesthesia Type: general    Patient location during evaluation: PACU  Patient participation: Yes- Able to Participate  Level of consciousness: awake and alert and oriented  Post-procedure vital signs: reviewed and stable  Pain management: adequate  Airway patency: patent    PONV status at discharge: No PONV  Anesthetic complications: no      Cardiovascular status: hemodynamically stable  Respiratory status: unassisted and spontaneous ventilation  Hydration status: euvolemic  Follow-up not needed.          Vitals Value Taken Time   /83 09/25/20 1231   Temp 37.1 °C (98.8 °F) 09/25/20 1230   Pulse 75 09/25/20 1235   Resp 17 09/25/20 1230   SpO2 91 % 09/25/20 1235   Vitals shown include unvalidated device data.      No case tracking events are documented in the log.      Pain/Marely Score: Pain Rating Prior to Med Admin: 6 (9/25/2020 12:00 PM)  Marely Score: 10 (9/25/2020 12:00 PM)

## 2020-09-25 NOTE — ANESTHESIA PROCEDURE NOTES
Intubation  Performed by: Flor Gamino MD  Authorized by: Jaleesa Bradshaw MD     Intubation:     Induction:  Intravenous    Intubated:  Postinduction    Mask Ventilation:  Easy mask    Attempts:  2    Attempted By:  Resident anesthesiologist    Method of Intubation:  Direct    Blade:  Nas 3    Laryngeal View Grade: Grade III - only epiglottis visible      Attempted By (2nd Attempt):  Staff anesthesiologist    Method of Intubation (2nd Attempt):  Video laryngoscopy    Blade (2nd Attempt):  Glidescope 3    Laryngeal View Grade (2nd Attempt): Grade I - full view of cords      Difficult Airway Encountered?: Yes      Airway Device:  Oral endotracheal tube    Airway Device Size:  7.0    Style/Cuff Inflation:  Cuffed    Inflation Amount (mL):  6    Tube secured:  23    Secured at:  The lips    Placement Verified By:  Capnometry    Complicating Factors:  Anterior larynx, large/floppy epiglottis, obesity and short neck    Findings Post-Intubation:  BS equal bilateral and atraumatic/condition of teeth unchanged

## 2020-09-29 ENCOUNTER — PATIENT MESSAGE (OUTPATIENT)
Dept: OTOLARYNGOLOGY | Facility: CLINIC | Age: 40
End: 2020-09-29

## 2020-10-01 ENCOUNTER — PATIENT MESSAGE (OUTPATIENT)
Dept: OTOLARYNGOLOGY | Facility: CLINIC | Age: 40
End: 2020-10-01

## 2020-10-01 DIAGNOSIS — J34.2 DEVIATED NASAL SEPTUM: Primary | ICD-10-CM

## 2020-10-01 RX ORDER — CEPHALEXIN 500 MG/1
500 CAPSULE ORAL EVERY 8 HOURS
Qty: 21 CAPSULE | Refills: 0 | Status: SHIPPED | OUTPATIENT
Start: 2020-10-01 | End: 2020-10-08

## 2020-10-07 ENCOUNTER — TELEPHONE (OUTPATIENT)
Dept: OTOLARYNGOLOGY | Facility: CLINIC | Age: 40
End: 2020-10-07

## 2020-10-14 ENCOUNTER — PATIENT MESSAGE (OUTPATIENT)
Dept: PAIN MEDICINE | Facility: CLINIC | Age: 40
End: 2020-10-14

## 2020-10-20 ENCOUNTER — PATIENT MESSAGE (OUTPATIENT)
Dept: INTERNAL MEDICINE | Facility: CLINIC | Age: 40
End: 2020-10-20

## 2020-10-20 DIAGNOSIS — F31.9 BIPOLAR 1 DISORDER: Primary | ICD-10-CM

## 2020-10-21 ENCOUNTER — PATIENT MESSAGE (OUTPATIENT)
Dept: INTERNAL MEDICINE | Facility: CLINIC | Age: 40
End: 2020-10-21

## 2020-10-21 RX ORDER — LAMOTRIGINE 150 MG/1
150 TABLET ORAL DAILY
Qty: 90 TABLET | Refills: 0 | Status: SHIPPED | OUTPATIENT
Start: 2020-10-21

## 2020-10-21 RX ORDER — BUPROPION HYDROCHLORIDE 200 MG/1
200 TABLET, EXTENDED RELEASE ORAL DAILY
Qty: 90 TABLET | Refills: 0 | Status: SHIPPED | OUTPATIENT
Start: 2020-10-21

## 2020-10-21 RX ORDER — VORTIOXETINE 10 MG/1
10 TABLET, FILM COATED ORAL DAILY
Qty: 90 TABLET | Refills: 0 | Status: SHIPPED | OUTPATIENT
Start: 2020-10-21

## 2020-10-21 NOTE — TELEPHONE ENCOUNTER
I sent her my chart message that I was happy to give a temporary refill but that future refills need to come from Psychiatry

## 2020-11-02 ENCOUNTER — PATIENT OUTREACH (OUTPATIENT)
Dept: ADMINISTRATIVE | Facility: OTHER | Age: 40
End: 2020-11-02

## 2020-11-02 NOTE — PROGRESS NOTES
Health Maintenance Due   Topic Date Due    Pap Smear  04/24/2020    Influenza Vaccine (1) 08/01/2020     Updates were requested from care everywhere.  Chart was reviewed for overdue Proactive [facility] Encounters (YEIMY) topics (CRS, Breast Cancer Screening, Eye exam)  Health Maintenance has been updated.  LINKS immunization registry triggered.  Immunizations were reconciled.

## 2020-11-10 ENCOUNTER — PATIENT MESSAGE (OUTPATIENT)
Dept: PAIN MEDICINE | Facility: CLINIC | Age: 40
End: 2020-11-10

## 2020-11-10 ENCOUNTER — OFFICE VISIT (OUTPATIENT)
Dept: OTOLARYNGOLOGY | Facility: CLINIC | Age: 40
End: 2020-11-10
Payer: COMMERCIAL

## 2020-11-10 VITALS — HEART RATE: 87 BPM | SYSTOLIC BLOOD PRESSURE: 126 MMHG | DIASTOLIC BLOOD PRESSURE: 79 MMHG

## 2020-11-10 DIAGNOSIS — J34.2 DEVIATED NASAL SEPTUM: ICD-10-CM

## 2020-11-10 DIAGNOSIS — J31.0 CHRONIC RHINITIS: Primary | ICD-10-CM

## 2020-11-10 DIAGNOSIS — J34.3 NASAL TURBINATE HYPERTROPHY: ICD-10-CM

## 2020-11-10 PROCEDURE — 99999 PR PBB SHADOW E&M-EST. PATIENT-LVL III: ICD-10-PCS | Mod: PBBFAC,,, | Performed by: OTOLARYNGOLOGY

## 2020-11-10 PROCEDURE — 99024 PR POST-OP FOLLOW-UP VISIT: ICD-10-PCS | Mod: S$GLB,,, | Performed by: OTOLARYNGOLOGY

## 2020-11-10 PROCEDURE — 99999 PR PBB SHADOW E&M-EST. PATIENT-LVL III: CPT | Mod: PBBFAC,,, | Performed by: OTOLARYNGOLOGY

## 2020-11-10 PROCEDURE — 99024 POSTOP FOLLOW-UP VISIT: CPT | Mod: S$GLB,,, | Performed by: OTOLARYNGOLOGY

## 2020-11-10 NOTE — PROGRESS NOTES
Subjective:      Sarah Ulloa is a 40 y.o. female who comes for follow-up 6 weeks status-post septum and turbinate surgery.  Her last visit with me was on 9/2/2020.  Exposed to covid prior to first POV, but now doing well.  Breating 70% better, no longer snoring, feels very good.  Only issue is increased nasal mucus, nonpurulent, bilateral, associated pruritic symptoms and sneezing.  Uses no sprays or meds.    SNOT-22 score: : (P) 24  NOSE score:: (P) 20%  ETDQ-7 score:: (P) 1.9      Objective:     /79   Pulse 87      General:   not in distress   Nasal:  edematous mucosa   incision intact   no septal hematoma   no bleeding   Oral Cavity:   clear   Oropharynx:   no bleeding   Neck:   nontender       Procedure     None        Data Reviewed    None.      Assessment:     New allergic rhinitis following septoplasty and turbinate reduction.    1. Chronic rhinitis    2. Deviated nasal septum    3. Nasal turbinate hypertrophy         Plan:     Suggest loratadine daily prn.  Continue daily saline rinse.  Follow up if symptoms worsen or fail to improve.

## 2020-11-11 ENCOUNTER — PATIENT MESSAGE (OUTPATIENT)
Dept: PAIN MEDICINE | Facility: CLINIC | Age: 40
End: 2020-11-11

## 2020-11-17 ENCOUNTER — OFFICE VISIT (OUTPATIENT)
Dept: PAIN MEDICINE | Facility: CLINIC | Age: 40
End: 2020-11-17
Attending: ANESTHESIOLOGY
Payer: COMMERCIAL

## 2020-11-17 VITALS
TEMPERATURE: 98 F | WEIGHT: 215.63 LBS | SYSTOLIC BLOOD PRESSURE: 126 MMHG | OXYGEN SATURATION: 100 % | RESPIRATION RATE: 18 BRPM | DIASTOLIC BLOOD PRESSURE: 84 MMHG | HEIGHT: 60 IN | HEART RATE: 73 BPM | BODY MASS INDEX: 42.33 KG/M2

## 2020-11-17 DIAGNOSIS — M79.10 MYALGIA: ICD-10-CM

## 2020-11-17 DIAGNOSIS — M47.812 CERVICAL SPONDYLOSIS: ICD-10-CM

## 2020-11-17 DIAGNOSIS — M53.3 SACROILIAC JOINT PAIN: Primary | ICD-10-CM

## 2020-11-17 DIAGNOSIS — M47.816 LUMBAR SPONDYLOSIS: ICD-10-CM

## 2020-11-17 PROCEDURE — 1125F AMNT PAIN NOTED PAIN PRSNT: CPT | Mod: S$GLB,,, | Performed by: ANESTHESIOLOGY

## 2020-11-17 PROCEDURE — 99204 PR OFFICE/OUTPT VISIT, NEW, LEVL IV, 45-59 MIN: ICD-10-PCS | Mod: S$GLB,,, | Performed by: ANESTHESIOLOGY

## 2020-11-17 PROCEDURE — 1125F PR PAIN SEVERITY QUANTIFIED, PAIN PRESENT: ICD-10-PCS | Mod: S$GLB,,, | Performed by: ANESTHESIOLOGY

## 2020-11-17 PROCEDURE — 3008F PR BODY MASS INDEX (BMI) DOCUMENTED: ICD-10-PCS | Mod: CPTII,S$GLB,, | Performed by: ANESTHESIOLOGY

## 2020-11-17 PROCEDURE — 99999 PR PBB SHADOW E&M-EST. PATIENT-LVL III: CPT | Mod: PBBFAC,,, | Performed by: ANESTHESIOLOGY

## 2020-11-17 PROCEDURE — 99999 PR PBB SHADOW E&M-EST. PATIENT-LVL III: ICD-10-PCS | Mod: PBBFAC,,, | Performed by: ANESTHESIOLOGY

## 2020-11-17 PROCEDURE — 99204 OFFICE O/P NEW MOD 45 MIN: CPT | Mod: S$GLB,,, | Performed by: ANESTHESIOLOGY

## 2020-11-17 PROCEDURE — 3008F BODY MASS INDEX DOCD: CPT | Mod: CPTII,S$GLB,, | Performed by: ANESTHESIOLOGY

## 2020-11-17 NOTE — LETTER
November 17, 2020      Nini Pryor MD  [address] [place] [address]  [address]  [place] [address]           [facility]  [address]  [place] [address]  Phone: [phone]  Fax: [phone]          Patient: Sarah Ulloa   MR Number: [id number]   YOB: 1980   Date of Visit: 11/17/2020       Dear Dr. Nini Pryor:    Thank you for referring Sarah Ulloa to me for evaluation. Attached you will find relevant portions of my assessment and plan of care.    If you have questions, please do not hesitate to call me. I look forward to following Sarah Ulloa along with you.    Sincerely,    Ronald Morgan MD    Enclosure  CC:  No Recipients    If you would like to receive this communication electronically, please contact [email or web address] or [phone] to request more information on Cross Current Link access.    For providers and/or their staff who would like to refer a patient to [facility], please contact us through our one-stop-shop provider referral line, [facility], at [phone].    If you feel you have received this communication in error or would no longer like to receive these types of communications, please e-mail [email or web address]
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
## 2020-11-18 ENCOUNTER — PATIENT MESSAGE (OUTPATIENT)
Dept: INTERNAL MEDICINE | Facility: CLINIC | Age: 40
End: 2020-11-18

## 2020-11-23 ENCOUNTER — PATIENT MESSAGE (OUTPATIENT)
Dept: PAIN MEDICINE | Facility: OTHER | Age: 40
End: 2020-11-23

## 2020-11-25 ENCOUNTER — PATIENT MESSAGE (OUTPATIENT)
Dept: INTERNAL MEDICINE | Facility: CLINIC | Age: 40
End: 2020-11-25

## 2020-11-27 ENCOUNTER — TELEPHONE (OUTPATIENT)
Dept: PAIN MEDICINE | Facility: CLINIC | Age: 40
End: 2020-11-27

## 2020-11-27 NOTE — TELEPHONE ENCOUNTER
Staff contacted [facility] to schedule a peer to peer and received a message on VM that the department was closed to contact them back on Monday

## 2020-11-27 NOTE — TELEPHONE ENCOUNTER
"----- Message from Sarah Tolbert sent at 11/27/2020  8:46 AM CST -----  Hello     Please contact [email or web address] at [phone] and ref case number [id number] to schedule a p2p.    Denied Based on [facility] Comprehensive Musculoskeletal Management Guideline, : Sacroiliac Joint Procedures (used to treat pain in hip region), we are unable to approve the requested procedure. Sacroiliac (SI) joint injections are supported when at least 3 of the following provocative tests for reproduction of pain are positive on physical examination: 1) Distraction or "Gapping" or DYNA/Raymon's Test, 2) Thigh Thrust or Posterior Pelvic Pain Provocational Test, 3) Gaenslan's Test, 4) Sacroiliac Joint Compression Test, or 5) Sacral Thrust or Yeoman's Test. The clinical information provided does not meet this criterion and, therefore, the requested SI joint injection is not indicated at this time.    "

## 2020-12-01 ENCOUNTER — TELEPHONE (OUTPATIENT)
Dept: PAIN MEDICINE | Facility: CLINIC | Age: 40
End: 2020-12-01

## 2020-12-01 NOTE — TELEPHONE ENCOUNTER
Staff contacted [facility] and scheduled a peer to peer for  12/04/20 at 10:30am to be contacted on his cell phone

## 2020-12-01 NOTE — TELEPHONE ENCOUNTER
"----- Message from Gloria Garcia MA sent at 11/27/2020  3:58 PM CST -----  Please contact harjinder at [phone] and ref case number [id number] to schedule a p2p.     Denied Based on [facility] Comprehensive Musculoskeletal Management Guideline, : Sacroiliac Joint Procedures (used to treat pain in hip region), we are unable to approve the requested procedure. Sacroiliac (SI) joint injections are supported when at least 3 of the following provocative tests for reproduction of pain are positive on physical examination: 1) Distraction or "Gapping" or DYAN/Raymon's Test, 2) Thigh Thrust or Posterior Pelvic Pain Provocational Test, 3) Gaenslan's Test, 4) Sacroiliac Joint Compression Test, or 5) Sacral Thrust or Yeoman's Test. The clinical information provided does not meet this criterion and, therefore, the requested SI joint injection is not indicated at this time.     "

## 2020-12-07 ENCOUNTER — PATIENT MESSAGE (OUTPATIENT)
Dept: PAIN MEDICINE | Facility: OTHER | Age: 40
End: 2020-12-07

## 2020-12-15 ENCOUNTER — PATIENT MESSAGE (OUTPATIENT)
Dept: PAIN MEDICINE | Facility: OTHER | Age: 40
End: 2020-12-15

## 2020-12-15 ENCOUNTER — PATIENT OUTREACH (OUTPATIENT)
Dept: ADMINISTRATIVE | Facility: OTHER | Age: 40
End: 2020-12-15

## 2021-01-05 ENCOUNTER — PATIENT MESSAGE (OUTPATIENT)
Dept: ADMINISTRATIVE | Facility: HOSPITAL | Age: 41
End: 2021-01-05

## 2021-01-07 ENCOUNTER — PATIENT OUTREACH (OUTPATIENT)
Dept: ADMINISTRATIVE | Facility: HOSPITAL | Age: 41
End: 2021-01-07

## 2021-01-08 ENCOUNTER — PATIENT MESSAGE (OUTPATIENT)
Dept: INTERNAL MEDICINE | Facility: CLINIC | Age: 41
End: 2021-01-08

## 2021-01-08 DIAGNOSIS — E55.9 VITAMIN D DEFICIENCY: Primary | ICD-10-CM

## 2021-01-11 RX ORDER — CHOLECALCIFEROL (VITAMIN D3) 50 MCG
1 TABLET ORAL DAILY
Qty: 90 EACH | Refills: 3 | Status: SHIPPED | OUTPATIENT
Start: 2021-01-11

## 2021-01-15 ENCOUNTER — PATIENT OUTREACH (OUTPATIENT)
Dept: ADMINISTRATIVE | Facility: HOSPITAL | Age: 41
End: 2021-01-15

## 2021-01-16 ENCOUNTER — PATIENT OUTREACH (OUTPATIENT)
Dept: ADMINISTRATIVE | Facility: OTHER | Age: 41
End: 2021-01-16

## 2021-02-05 ENCOUNTER — PATIENT MESSAGE (OUTPATIENT)
Dept: HEPATOLOGY | Facility: CLINIC | Age: 41
End: 2021-02-05

## 2021-02-14 ENCOUNTER — PATIENT MESSAGE (OUTPATIENT)
Dept: INTERNAL MEDICINE | Facility: CLINIC | Age: 41
End: 2021-02-14

## 2021-02-15 ENCOUNTER — OFFICE VISIT (OUTPATIENT)
Dept: INTERNAL MEDICINE | Facility: CLINIC | Age: 41
End: 2021-02-15
Payer: COMMERCIAL

## 2021-02-15 ENCOUNTER — PATIENT MESSAGE (OUTPATIENT)
Dept: INTERNAL MEDICINE | Facility: CLINIC | Age: 41
End: 2021-02-15

## 2021-02-15 ENCOUNTER — TELEPHONE (OUTPATIENT)
Dept: PAIN MEDICINE | Facility: CLINIC | Age: 41
End: 2021-02-15

## 2021-02-15 ENCOUNTER — LAB VISIT (OUTPATIENT)
Dept: INTERNAL MEDICINE | Facility: CLINIC | Age: 41
End: 2021-02-15
Payer: COMMERCIAL

## 2021-02-15 DIAGNOSIS — R50.9 FEVER WITH EXPOSURE TO COVID-19 VIRUS: ICD-10-CM

## 2021-02-15 DIAGNOSIS — Z20.822 FEVER WITH EXPOSURE TO COVID-19 VIRUS: ICD-10-CM

## 2021-02-15 DIAGNOSIS — R19.7 DIARRHEA, UNSPECIFIED TYPE: ICD-10-CM

## 2021-02-15 DIAGNOSIS — R68.83 CHILLS: ICD-10-CM

## 2021-02-15 DIAGNOSIS — R11.0 NAUSEA: ICD-10-CM

## 2021-02-15 DIAGNOSIS — R11.2 NON-INTRACTABLE VOMITING WITH NAUSEA, UNSPECIFIED VOMITING TYPE: Primary | ICD-10-CM

## 2021-02-15 PROCEDURE — 99442 PR PHYSICIAN TELEPHONE EVALUATION 11-20 MIN: CPT | Mod: 95,,, | Performed by: INTERNAL MEDICINE

## 2021-02-15 PROCEDURE — U0003 INFECTIOUS AGENT DETECTION BY NUCLEIC ACID (DNA OR RNA); SEVERE ACUTE RESPIRATORY SYNDROME CORONAVIRUS 2 (SARS-COV-2) (CORONAVIRUS DISEASE [COVID-19]), AMPLIFIED PROBE TECHNIQUE, MAKING USE OF HIGH THROUGHPUT TECHNOLOGIES AS DESCRIBED BY CMS-2020-01-R: HCPCS

## 2021-02-15 PROCEDURE — 99442 PR PHYSICIAN TELEPHONE EVALUATION 11-20 MIN: ICD-10-PCS | Mod: 95,,, | Performed by: INTERNAL MEDICINE

## 2021-02-15 PROCEDURE — U0005 INFEC AGEN DETEC AMPLI PROBE: HCPCS

## 2021-02-15 RX ORDER — ONDANSETRON 4 MG/1
4 TABLET, FILM COATED ORAL EVERY 8 HOURS PRN
Qty: 25 TABLET | Refills: 0 | Status: SHIPPED | OUTPATIENT
Start: 2021-02-15 | End: 2021-09-16

## 2021-02-16 ENCOUNTER — PATIENT MESSAGE (OUTPATIENT)
Dept: INTERNAL MEDICINE | Facility: CLINIC | Age: 41
End: 2021-02-16

## 2021-02-16 LAB — SARS-COV-2 RNA RESP QL NAA+PROBE: NOT DETECTED

## 2021-03-01 ENCOUNTER — PATIENT OUTREACH (OUTPATIENT)
Dept: ADMINISTRATIVE | Facility: OTHER | Age: 41
End: 2021-03-01

## 2021-03-03 ENCOUNTER — OFFICE VISIT (OUTPATIENT)
Dept: GASTROENTEROLOGY | Facility: CLINIC | Age: 41
End: 2021-03-03
Payer: COMMERCIAL

## 2021-03-03 ENCOUNTER — LAB VISIT (OUTPATIENT)
Dept: LAB | Facility: HOSPITAL | Age: 41
End: 2021-03-03
Attending: INTERNAL MEDICINE
Payer: COMMERCIAL

## 2021-03-03 VITALS — BODY MASS INDEX: 41.33 KG/M2 | WEIGHT: 211.63 LBS

## 2021-03-03 DIAGNOSIS — R19.7 DIARRHEA, UNSPECIFIED TYPE: Primary | ICD-10-CM

## 2021-03-03 DIAGNOSIS — R19.7 DIARRHEA, UNSPECIFIED TYPE: ICD-10-CM

## 2021-03-03 DIAGNOSIS — K58.0 IRRITABLE BOWEL SYNDROME WITH DIARRHEA: ICD-10-CM

## 2021-03-03 PROCEDURE — 3008F BODY MASS INDEX DOCD: CPT | Mod: CPTII,S$GLB,, | Performed by: INTERNAL MEDICINE

## 2021-03-03 PROCEDURE — 99999 PR PBB SHADOW E&M-EST. PATIENT-LVL III: CPT | Mod: PBBFAC,,, | Performed by: INTERNAL MEDICINE

## 2021-03-03 PROCEDURE — 83516 IMMUNOASSAY NONANTIBODY: CPT | Performed by: INTERNAL MEDICINE

## 2021-03-03 PROCEDURE — 3008F PR BODY MASS INDEX (BMI) DOCUMENTED: ICD-10-PCS | Mod: CPTII,S$GLB,, | Performed by: INTERNAL MEDICINE

## 2021-03-03 PROCEDURE — 99999 PR PBB SHADOW E&M-EST. PATIENT-LVL III: ICD-10-PCS | Mod: PBBFAC,,, | Performed by: INTERNAL MEDICINE

## 2021-03-03 PROCEDURE — 99204 OFFICE O/P NEW MOD 45 MIN: CPT | Mod: S$GLB,,, | Performed by: INTERNAL MEDICINE

## 2021-03-03 PROCEDURE — 99204 PR OFFICE/OUTPT VISIT, NEW, LEVL IV, 45-59 MIN: ICD-10-PCS | Mod: S$GLB,,, | Performed by: INTERNAL MEDICINE

## 2021-03-03 PROCEDURE — 82784 ASSAY IGA/IGD/IGG/IGM EACH: CPT | Performed by: INTERNAL MEDICINE

## 2021-03-04 LAB — IGA SERPL-MCNC: 58 MG/DL (ref 40–350)

## 2021-03-08 LAB — TTG IGA SER-ACNC: 2 UNITS

## 2021-04-14 ENCOUNTER — PATIENT MESSAGE (OUTPATIENT)
Dept: ADMINISTRATIVE | Facility: CLINIC | Age: 41
End: 2021-04-14

## 2021-04-21 ENCOUNTER — PATIENT MESSAGE (OUTPATIENT)
Dept: PAIN MEDICINE | Facility: CLINIC | Age: 41
End: 2021-04-21

## 2021-07-15 ENCOUNTER — PATIENT MESSAGE (OUTPATIENT)
Dept: INTERNAL MEDICINE | Facility: CLINIC | Age: 41
End: 2021-07-15

## 2021-07-16 ENCOUNTER — OFFICE VISIT (OUTPATIENT)
Dept: INTERNAL MEDICINE | Facility: CLINIC | Age: 41
End: 2021-07-16
Payer: COMMERCIAL

## 2021-07-16 DIAGNOSIS — G89.29 CHRONIC PAIN OF LEFT HAND: Primary | ICD-10-CM

## 2021-07-16 DIAGNOSIS — M79.642 CHRONIC PAIN OF LEFT HAND: Primary | ICD-10-CM

## 2021-07-16 DIAGNOSIS — M47.812 CERVICAL SPONDYLOSIS: ICD-10-CM

## 2021-07-16 PROCEDURE — 1159F MED LIST DOCD IN RCRD: CPT | Mod: CPTII,,, | Performed by: INTERNAL MEDICINE

## 2021-07-16 PROCEDURE — 99213 PR OFFICE/OUTPT VISIT, EST, LEVL III, 20-29 MIN: ICD-10-PCS | Mod: 95,,, | Performed by: INTERNAL MEDICINE

## 2021-07-16 PROCEDURE — 99213 OFFICE O/P EST LOW 20 MIN: CPT | Mod: 95,,, | Performed by: INTERNAL MEDICINE

## 2021-07-16 PROCEDURE — 1160F PR REVIEW ALL MEDS BY PRESCRIBER/CLIN PHARMACIST DOCUMENTED: ICD-10-PCS | Mod: CPTII,,, | Performed by: INTERNAL MEDICINE

## 2021-07-16 PROCEDURE — 1160F RVW MEDS BY RX/DR IN RCRD: CPT | Mod: CPTII,,, | Performed by: INTERNAL MEDICINE

## 2021-07-16 PROCEDURE — 1159F PR MEDICATION LIST DOCUMENTED IN MEDICAL RECORD: ICD-10-PCS | Mod: CPTII,,, | Performed by: INTERNAL MEDICINE

## 2021-07-16 RX ORDER — HYDROMORPHONE HYDROCHLORIDE 2 MG/1
2 TABLET ORAL EVERY 8 HOURS PRN
Qty: 20 TABLET | Refills: 0 | Status: SHIPPED | OUTPATIENT
Start: 2021-07-16 | End: 2021-07-23

## 2021-08-04 ENCOUNTER — TELEPHONE (OUTPATIENT)
Dept: PAIN MEDICINE | Facility: CLINIC | Age: 41
End: 2021-08-04

## 2021-08-05 ENCOUNTER — PATIENT OUTREACH (OUTPATIENT)
Dept: ADMINISTRATIVE | Facility: OTHER | Age: 41
End: 2021-08-05

## 2021-08-09 ENCOUNTER — PATIENT MESSAGE (OUTPATIENT)
Dept: NEUROLOGY | Facility: CLINIC | Age: 41
End: 2021-08-09

## 2021-08-10 ENCOUNTER — PATIENT MESSAGE (OUTPATIENT)
Dept: OTOLARYNGOLOGY | Facility: CLINIC | Age: 41
End: 2021-08-10

## 2021-08-13 ENCOUNTER — PATIENT MESSAGE (OUTPATIENT)
Dept: HEMATOLOGY/ONCOLOGY | Facility: CLINIC | Age: 41
End: 2021-08-13

## 2021-08-20 ENCOUNTER — PATIENT MESSAGE (OUTPATIENT)
Dept: OTOLARYNGOLOGY | Facility: CLINIC | Age: 41
End: 2021-08-20

## 2021-08-20 DIAGNOSIS — Z01.818 PRE-OP TESTING: ICD-10-CM

## 2021-09-16 ENCOUNTER — OFFICE VISIT (OUTPATIENT)
Dept: NEUROLOGY | Facility: CLINIC | Age: 41
End: 2021-09-16
Payer: COMMERCIAL

## 2021-09-16 DIAGNOSIS — G93.2 PSEUDOTUMOR CEREBRI SYNDROME: ICD-10-CM

## 2021-09-16 DIAGNOSIS — G43.009 MIGRAINE WITHOUT AURA AND WITHOUT STATUS MIGRAINOSUS, NOT INTRACTABLE: ICD-10-CM

## 2021-09-16 RX ORDER — CYCLOBENZAPRINE HCL 5 MG
5 TABLET ORAL DAILY PRN
Qty: 30 TABLET | Refills: 2 | Status: SHIPPED | OUTPATIENT
Start: 2021-09-16 | End: 2021-09-26

## 2021-09-16 RX ORDER — ACETAZOLAMIDE 250 MG/1
250 TABLET ORAL DAILY
Qty: 90 TABLET | Refills: 3 | Status: SHIPPED | OUTPATIENT
Start: 2021-09-16 | End: 2022-01-30

## 2021-10-01 ENCOUNTER — PATIENT MESSAGE (OUTPATIENT)
Dept: OTOLARYNGOLOGY | Facility: CLINIC | Age: 41
End: 2021-10-01

## 2021-10-14 ENCOUNTER — PATIENT MESSAGE (OUTPATIENT)
Dept: OTOLARYNGOLOGY | Facility: CLINIC | Age: 41
End: 2021-10-14
Payer: COMMERCIAL

## 2021-10-14 ENCOUNTER — TELEPHONE (OUTPATIENT)
Dept: OTOLARYNGOLOGY | Facility: CLINIC | Age: 41
End: 2021-10-14

## 2021-10-14 DIAGNOSIS — Z01.818 PRE-OP TESTING: ICD-10-CM

## 2022-01-07 ENCOUNTER — PATIENT MESSAGE (OUTPATIENT)
Dept: ADMINISTRATIVE | Facility: OTHER | Age: 42
End: 2022-01-07
Payer: COMMERCIAL

## 2022-01-13 ENCOUNTER — PATIENT MESSAGE (OUTPATIENT)
Dept: PRIMARY CARE CLINIC | Facility: CLINIC | Age: 42
End: 2022-01-13
Payer: COMMERCIAL

## 2022-01-13 ENCOUNTER — PATIENT MESSAGE (OUTPATIENT)
Dept: NEUROLOGY | Facility: CLINIC | Age: 42
End: 2022-01-13
Payer: COMMERCIAL

## 2022-01-14 ENCOUNTER — OFFICE VISIT (OUTPATIENT)
Dept: INTERNAL MEDICINE | Facility: CLINIC | Age: 42
End: 2022-01-14
Payer: COMMERCIAL

## 2022-01-14 DIAGNOSIS — J32.9 SINUSITIS, UNSPECIFIED CHRONICITY, UNSPECIFIED LOCATION: ICD-10-CM

## 2022-01-14 DIAGNOSIS — U07.1 COVID-19: Primary | ICD-10-CM

## 2022-01-14 PROBLEM — M79.10 MYALGIA: Status: RESOLVED | Noted: 2020-11-17 | Resolved: 2022-01-14

## 2022-01-14 PROBLEM — G97.1 HEADACHE, POST-LUMBAR PUNCTURE: Status: RESOLVED | Noted: 2017-08-21 | Resolved: 2022-01-14

## 2022-01-14 PROBLEM — J34.89 NASAL OBSTRUCTION: Status: RESOLVED | Noted: 2020-09-25 | Resolved: 2022-01-14

## 2022-01-14 PROBLEM — R10.11 RUQ ABDOMINAL PAIN: Status: RESOLVED | Noted: 2019-02-04 | Resolved: 2022-01-14

## 2022-01-14 PROCEDURE — 1160F PR REVIEW ALL MEDS BY PRESCRIBER/CLIN PHARMACIST DOCUMENTED: ICD-10-PCS | Mod: CPTII,95,, | Performed by: INTERNAL MEDICINE

## 2022-01-14 PROCEDURE — 1160F RVW MEDS BY RX/DR IN RCRD: CPT | Mod: CPTII,95,, | Performed by: INTERNAL MEDICINE

## 2022-01-14 PROCEDURE — 99213 OFFICE O/P EST LOW 20 MIN: CPT | Mod: 95,,, | Performed by: INTERNAL MEDICINE

## 2022-01-14 PROCEDURE — 1159F PR MEDICATION LIST DOCUMENTED IN MEDICAL RECORD: ICD-10-PCS | Mod: CPTII,95,, | Performed by: INTERNAL MEDICINE

## 2022-01-14 PROCEDURE — 1159F MED LIST DOCD IN RCRD: CPT | Mod: CPTII,95,, | Performed by: INTERNAL MEDICINE

## 2022-01-14 PROCEDURE — 99213 PR OFFICE/OUTPT VISIT, EST, LEVL III, 20-29 MIN: ICD-10-PCS | Mod: 95,,, | Performed by: INTERNAL MEDICINE

## 2022-01-14 RX ORDER — CYCLOBENZAPRINE HCL 5 MG
TABLET ORAL
COMMUNITY
Start: 2021-09-28 | End: 2023-10-17

## 2022-01-14 RX ORDER — AZITHROMYCIN 250 MG/1
TABLET, FILM COATED ORAL
Qty: 6 TABLET | Refills: 0 | Status: SHIPPED | OUTPATIENT
Start: 2022-01-14 | End: 2023-03-20

## 2022-01-14 NOTE — PROGRESS NOTES
Telemedicine Video Visit    The patient location is:  Patient Home   The chief complaint leading to consultation is: COVID, sinus  Visit type: Virtual visit with synchronous audio and video  Total time spent with patient: 15 minutes  Each patient to whom he or she provides medical services by telemedicine is:  (1) informed of the relationship between the physician and patient and the respective role of any other health care provider with respect to management of the patient; and (2) notified that he or she may decline to receive medical services by telemedicine and may withdraw from such care at any time.     COVID diagnosed on Saturday 6 days ago  Started feeling bad on January 6  Feels achy and tired, headache, cough.  No fever.  Diarrhea.  No chest pain or SOB.  No vomiting/  Some fatigue.  Slight improvement but has been slow.  Does not have pulse oximeter.    Patient Active Problem List   Diagnosis    BMI 40.0-44.9, adult    Thyroid nodule    Chronic left-sided low back pain without sciatica    Bipolar 1 disorder    S/P Nissen fundoplication (without gastrostomy tube) procedure    Factor 5 Leiden mutation, heterozygous    Migraine without aura and without status migrainosus, not intractable    Iron deficiency    Vitamin D deficiency    Prediabetes    Pseudotumor cerebri syndrome    History of hypercoagulable state    Sacroiliac joint pain    Cervical spondylosis    Lumbar spondylosis     Review of Systems   Constitutional: Negative for chills, fever and weight loss.   HENT: Positive for sore throat. Negative for ear pain.    Respiratory: Negative for hemoptysis, shortness of breath and wheezing.    Cardiovascular: Negative for chest pain.   Gastrointestinal: Negative for heartburn.   Musculoskeletal: Positive for myalgias.   Skin: Negative for rash.   Neurological: Negative for headaches.   Endo/Heme/Allergies: Negative for environmental allergies.     Physical Exam  Constitutional:        Appearance: She is well-developed.   HENT:      Head: Normocephalic and atraumatic.      Right Ear: External ear normal.      Left Ear: External ear normal.   Eyes:      Extraocular Movements: Extraocular movements intact.      Conjunctiva/sclera: Conjunctivae normal.   Neck:      Thyroid: No thyromegaly.   Cardiovascular:      Heart sounds: Normal heart sounds.   Pulmonary:      Effort: No respiratory distress.   Musculoskeletal:      Cervical back: Normal range of motion and neck supple.   Skin:     Findings: No rash.   Neurological:      General: No focal deficit present.      Mental Status: She is alert and oriented to person, place, and time.   Psychiatric:         Mood and Affect: Mood normal.         Behavior: Behavior normal.         Thought Content: Thought content normal.         Judgment: Judgment normal.     Diagnoses and all orders for this visit:    COVID-19: natural history and alarm sx reviewed  Review [facility] COVID info and CDC info    Sinusitis, unspecified chronicity, unspecified location  -     azithromycin (Z-ZAIDA) 250 MG tablet; Take 2 tablets by mouth on day 1; Take 1 tablet by mouth on days 2-5    Rest, fluids, acetaminophen, mucinex and follow up poor results.  Quarantine and isolation issues discussed  Hydration and diet reviewed  Chart to PCP

## 2022-01-14 NOTE — PATIENT INSTRUCTIONS
Your test was POSITIVE for COVID-19 (coronavirus).       Please isolate yourself at home.  You may leave home and/or return to work once the following conditions are met:    If you were not hospitalized and are not moderately to severely immunocompromised:    More than 5 days since symptoms first appeared AND   More than 24 hours fever free without medications AND   Symptoms are improving   Continue to wear a mask around others for 5 additional days.    If you were hospitalized OR are moderately to severely immunocompromised:   More than 20 days since symptoms first appeared   More than 24 hours fever free without medications   Symptoms have improved    If you had no symptoms but tested positive:   More than 5 days since the date of the first positive test (20 days if moderately to severely immunocompromised). If you develop symptoms, then use the guidelines above.   Continue to wear a mask around others for 5 additional days.      Contact Tracing    As one of the next steps, you will receive a call or text from the [place] [facility] of [facility] ([facility]) COVID-19 Tracing Team. See the contact information below so you know not to ignore the health departments call. It is important that you contact them back immediately so they can help.      Contact Tracer Number:  [phone]  Caller ID for most carriers: [facility]     What is contact tracing?  · Contact tracing is a process that helps identify everyone who has been in close contact with an infected person. Contact tracers let those people know they may have been exposed and guide them on next steps. Confidentiality is important for everyone; no one will be told who may have exposed them to the virus.  · Your involvement is important. The more we know about where and how this virus is spreading, the better chance we have at stopping it from spreading further.  What does exposure mean?  · Exposure means you have been within 6 feet for more than 15  minutes with a person who has or had COVID-19.  What kind of questions do the contact tracers ask?  · A contact tracer will confirm your basic contact information including name, address, phone number, and next of kin, as well as asking about any symptoms you may have had. Theyll also ask you how you think you may have gotten sick, such as places where you may have been exposed to the virus, and people you were with. Those names will never be shared with anyone outside of that call, and will only be used to help trace and stop the spread of the virus.   I have privacy concerns. How will the state use my information?  · Your privacy about your health is important. All calls are completed using call centers that use the appropriate health privacy protection measures (HIPAA compliance), meaning that your patient information is safe. No one will ever ask you any questions related to immigration status. Your health comes first.   Do I have to participate?  · You do not have to participate, but we strongly encourage you to. Contact tracing can help us catch and control new outbreaks as theyre developing to keep your friends and family safe.   What if I dont hear from anyone?  · If you dont receive a call within 24 hours, you can call the number above right away to inquire about your status. That line is open from 8:00 am - 8:00 p.m., 7 days a week.  Contact tracing saves lives! Together, we have the power to beat this virus and keep our loved ones and neighbors safe.    For more information see CDC link below.      https://www.cdc.gov/coronavirus/2019-ncov/hcp/guidance-prevent-spread.html#precautions        Sources:  [facility], [place] [facility] and [facility]           Sincerely,     Donna Shaikh MD

## 2022-06-23 ENCOUNTER — PATIENT MESSAGE (OUTPATIENT)
Dept: PRIMARY CARE CLINIC | Facility: CLINIC | Age: 42
End: 2022-06-23
Payer: COMMERCIAL

## 2022-06-23 DIAGNOSIS — R30.0 DYSURIA: Primary | ICD-10-CM

## 2022-06-24 ENCOUNTER — PATIENT MESSAGE (OUTPATIENT)
Dept: PRIMARY CARE CLINIC | Facility: CLINIC | Age: 42
End: 2022-06-24
Payer: COMMERCIAL

## 2022-06-24 RX ORDER — CIPROFLOXACIN 500 MG/1
500 TABLET ORAL EVERY 12 HOURS
Qty: 6 TABLET | Refills: 0 | Status: SHIPPED | OUTPATIENT
Start: 2022-06-24 | End: 2022-06-27

## 2022-07-01 DIAGNOSIS — Z12.31 OTHER SCREENING MAMMOGRAM: ICD-10-CM

## 2022-07-15 ENCOUNTER — TELEPHONE (OUTPATIENT)
Dept: FAMILY MEDICINE | Facility: CLINIC | Age: 42
End: 2022-07-15
Payer: COMMERCIAL

## 2022-07-15 NOTE — TELEPHONE ENCOUNTER
Informed Dr. Carpenter of cancellation. Patient can reschedule on her own if she would like to do so. Patient has cancelled multiple appointments.

## 2022-07-18 DIAGNOSIS — G43.009 MIGRAINE WITHOUT AURA AND WITHOUT STATUS MIGRAINOSUS, NOT INTRACTABLE: ICD-10-CM

## 2022-07-18 NOTE — TELEPHONE ENCOUNTER
No new care gaps identified.  [facility] Embedded Care Gaps. Reference number: [id number]. 7/18/2022   6:35:10 AM CDT

## 2022-07-19 RX ORDER — BUTALBITAL, ACETAMINOPHEN AND CAFFEINE 50; 325; 40 MG/1; MG/1; MG/1
1 TABLET ORAL 2 TIMES DAILY PRN
Qty: 30 TABLET | Refills: 0 | Status: SHIPPED | OUTPATIENT
Start: 2022-07-19 | End: 2023-03-23 | Stop reason: SDUPTHER

## 2022-07-27 ENCOUNTER — PATIENT MESSAGE (OUTPATIENT)
Dept: PRIMARY CARE CLINIC | Facility: CLINIC | Age: 42
End: 2022-07-27
Payer: COMMERCIAL

## 2022-09-13 ENCOUNTER — OFFICE VISIT (OUTPATIENT)
Dept: NEUROSURGERY | Facility: CLINIC | Age: 42
End: 2022-09-13
Payer: COMMERCIAL

## 2022-09-13 ENCOUNTER — PATIENT MESSAGE (OUTPATIENT)
Dept: NEUROSURGERY | Facility: CLINIC | Age: 42
End: 2022-09-13

## 2022-09-13 ENCOUNTER — HOSPITAL ENCOUNTER (OUTPATIENT)
Dept: RADIOLOGY | Facility: HOSPITAL | Age: 42
Discharge: HOME | End: 2022-09-13
Attending: NEUROLOGICAL SURGERY
Payer: COMMERCIAL

## 2022-09-13 ENCOUNTER — TELEPHONE (OUTPATIENT)
Dept: NEUROSURGERY | Facility: CLINIC | Age: 42
End: 2022-09-13
Payer: COMMERCIAL

## 2022-09-13 VITALS — HEIGHT: 61 IN | WEIGHT: 211.63 LBS | TEMPERATURE: 98 F | BODY MASS INDEX: 39.95 KG/M2

## 2022-09-13 DIAGNOSIS — Z98.1 S/P CERVICAL SPINAL FUSION: ICD-10-CM

## 2022-09-13 DIAGNOSIS — M43.12 SPONDYLOLISTHESIS OF CERVICAL REGION: ICD-10-CM

## 2022-09-13 DIAGNOSIS — M47.812 CERVICAL SPONDYLOSIS: ICD-10-CM

## 2022-09-13 DIAGNOSIS — M79.18 MYOFASCIAL PAIN SYNDROME, CERVICAL: ICD-10-CM

## 2022-09-13 DIAGNOSIS — M47.812 CERVICAL SPONDYLOSIS: Primary | ICD-10-CM

## 2022-09-13 DIAGNOSIS — M48.02 SPINAL STENOSIS, CERVICAL REGION: Primary | ICD-10-CM

## 2022-09-13 PROCEDURE — 72050 X-RAY EXAM NECK SPINE 4/5VWS: CPT | Mod: 26,,, | Performed by: RADIOLOGY

## 2022-09-13 PROCEDURE — 72050 X-RAY EXAM NECK SPINE 4/5VWS: CPT | Mod: TC,FY

## 2022-09-13 PROCEDURE — 99999 PR PBB SHADOW E&M-EST. PATIENT-LVL IV: CPT | Mod: PBBFAC,,, | Performed by: NEUROLOGICAL SURGERY

## 2022-09-13 PROCEDURE — 99999 PR PBB SHADOW E&M-EST. PATIENT-LVL IV: ICD-10-PCS | Mod: PBBFAC,,, | Performed by: NEUROLOGICAL SURGERY

## 2022-09-13 PROCEDURE — 1159F MED LIST DOCD IN RCRD: CPT | Mod: CPTII,S$GLB,, | Performed by: NEUROLOGICAL SURGERY

## 2022-09-13 PROCEDURE — 3008F PR BODY MASS INDEX (BMI) DOCUMENTED: ICD-10-PCS | Mod: CPTII,S$GLB,, | Performed by: NEUROLOGICAL SURGERY

## 2022-09-13 PROCEDURE — 72050 XR CERVICAL SPINE AP LAT WITH FLEX EXTEN: ICD-10-PCS | Mod: 26,,, | Performed by: RADIOLOGY

## 2022-09-13 PROCEDURE — 1159F PR MEDICATION LIST DOCUMENTED IN MEDICAL RECORD: ICD-10-PCS | Mod: CPTII,S$GLB,, | Performed by: NEUROLOGICAL SURGERY

## 2022-09-13 PROCEDURE — 3008F BODY MASS INDEX DOCD: CPT | Mod: CPTII,S$GLB,, | Performed by: NEUROLOGICAL SURGERY

## 2022-09-13 PROCEDURE — 99204 PR OFFICE/OUTPT VISIT, NEW, LEVL IV, 45-59 MIN: ICD-10-PCS | Mod: S$GLB,,, | Performed by: NEUROLOGICAL SURGERY

## 2022-09-13 PROCEDURE — 99204 OFFICE O/P NEW MOD 45 MIN: CPT | Mod: S$GLB,,, | Performed by: NEUROLOGICAL SURGERY

## 2022-09-13 RX ORDER — TRAMADOL HYDROCHLORIDE 50 MG/1
50 TABLET ORAL EVERY 8 HOURS PRN
Qty: 45 TABLET | Refills: 3 | Status: SHIPPED | OUTPATIENT
Start: 2022-09-13 | End: 2022-12-15 | Stop reason: SDUPTHER
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
## 2022-09-14 ENCOUNTER — PATIENT MESSAGE (OUTPATIENT)
Dept: NEUROSURGERY | Facility: CLINIC | Age: 42
End: 2022-09-14
Payer: COMMERCIAL

## 2022-10-24 NOTE — DISCHARGE SUMMARY
[facility]  Discharge Summary  General Surgery      Admit Date: 3/3/2017    Discharge Date and Time: 3/5/17    Attending Physician: Jonathon Peck MD     Discharge Provider: Randy Ku    Reason for Admission: lap hiatal hernia repair with toupet fundoplication    Procedures Performed: Procedure(s) (LRB):  laproscopic REPAIR HERNIA LAPAROSCOPIC HIATAL   (N/A)  [id number] (N/A)    Hospital Course (synopsis of major diagnoses, care, treatment, and services provided during the course of the hospital stay): The patient was admitted following the procedure. On POD 1, the patient was having significant nausea/vomiting. She was slowly advanced throughout POD 1 to clear liquids. She was kept overnight for observation again. On POD 2, the patient was advanced to full liquids which she tolerated well. She was ambulating, tolerating diet, pain well controlled on PO pain meds, labs/vitals within normal limits and was deemed stable for discharge.      Final Diagnoses:   Principal Problem: Hiatal hernia   Secondary Diagnoses:   Active Hospital Problems    Diagnosis  POA    *Hiatal hernia [K44.9]  Yes      Resolved Hospital Problems    Diagnosis Date Resolved POA   No resolved problems to display.       Discharged Condition: stable    Disposition: Home or Self Care    Follow Up/Patient Instructions:     Medications:  Reconciled Home Medications:   Current Discharge Medication List      START taking these medications    Details   hydrocodone-acetaminophen (HYCET) solution 7.5-325 mg/15mL Take 15 mLs by mouth 4 (four) times daily as needed for Pain.  Qty: 473 mL, Refills: 0      omeprazole (PRILOSEC) 20 MG capsule Take 1 capsule (20 mg total) by mouth once daily.  Qty: 30 capsule, Refills: 11      ondansetron (ZOFRAN-ODT) 4 MG TbDL Take 1 tablet (4 mg total) by mouth every 8 (eight) hours as needed (nausea).  Qty: 30 tablet, Refills: 0      polyethylene glycol (GLYCOLAX) 17  gram/dose powder Take 17 g by mouth daily as needed (constipation).  Qty: 510 g, Refills: 0      promethazine (PHENERGAN) 25 MG suppository Place 1 suppository (25 mg total) rectally every 6 (six) hours as needed for Nausea.  Qty: 15 suppository, Refills: 0         CONTINUE these medications which have NOT CHANGED    Details   buPROPion (WELLBUTRIN SR) 200 MG TbSR 200 mg 2 (two) times daily.      lamotrigine (LAMICTAL) 150 MG Tab 150 mg 2 (two) times daily.      pantoprazole (PROTONIX) 40 MG tablet Take 40 mg by mouth once daily.      SAPHRIS, BLACK CHERRY, 5 mg Subl 5 mg once daily.      TRINTELLIX 10 mg Tab 10 mg once daily.      tramadol (ULTRAM) 50 mg tablet 50 mg daily as needed. One or two tablets PRN      triamcinolone acetonide 0.1% (KENALOG) 0.1 % cream              Discharge Procedure Orders  Diet general     Activity as tolerated     Lifting restrictions   Order Comments: No heavy lifting > 10 lbs until return to clinic.     Call MD for:  temperature >100.4     Call MD for:  persistent nausea and vomiting or diarrhea     Call MD for:  severe uncontrolled pain     Call MD for:  redness, tenderness, or signs of infection (pain, swelling, redness, odor or green/yellow discharge around incision site)     Call MD for:  difficulty breathing or increased cough     No dressing needed   Order Comments: Shower 48 hours after surgery. Do not remove paper strips over wounds; will either fall off in shower or be removed when return to clinic. Do not soak in bath tub or swim.       Follow-up Information     Follow up with Jonathon Peck MD In 2 weeks.    Specialties:  General Surgery, Bariatrics    Why:  For wound re-check    Contact information:    [id number]  [facility] [address]  [phone]          Randy Ku PGY5   Home

## 2022-11-01 ENCOUNTER — PATIENT MESSAGE (OUTPATIENT)
Dept: PRIMARY CARE CLINIC | Facility: CLINIC | Age: 42
End: 2022-11-01
Payer: COMMERCIAL

## 2022-12-15 RX ORDER — TRAMADOL HYDROCHLORIDE 50 MG/1
50 TABLET ORAL EVERY 8 HOURS PRN
Qty: 90 TABLET | Refills: 3 | Status: SHIPPED | OUTPATIENT
Start: 2022-12-15

## 2022-12-16 ENCOUNTER — PATIENT MESSAGE (OUTPATIENT)
Dept: NEUROSURGERY | Facility: CLINIC | Age: 42
End: 2022-12-16
Payer: COMMERCIAL

## 2023-02-02 ENCOUNTER — PATIENT MESSAGE (OUTPATIENT)
Dept: NEUROLOGY | Facility: CLINIC | Age: 43
End: 2023-02-02
Payer: COMMERCIAL

## 2023-03-10 DIAGNOSIS — R52 PAIN: Primary | ICD-10-CM

## 2023-03-20 ENCOUNTER — OFFICE VISIT (OUTPATIENT)
Dept: NEUROLOGY | Facility: CLINIC | Age: 43
End: 2023-03-20
Payer: COMMERCIAL

## 2023-03-20 DIAGNOSIS — G43.009 MIGRAINE WITHOUT AURA AND WITHOUT STATUS MIGRAINOSUS, NOT INTRACTABLE: ICD-10-CM

## 2023-03-20 PROCEDURE — 1159F PR MEDICATION LIST DOCUMENTED IN MEDICAL RECORD: ICD-10-PCS | Mod: CPTII,95,, | Performed by: PSYCHIATRY & NEUROLOGY

## 2023-03-20 PROCEDURE — 1160F PR REVIEW ALL MEDS BY PRESCRIBER/CLIN PHARMACIST DOCUMENTED: ICD-10-PCS | Mod: CPTII,95,, | Performed by: PSYCHIATRY & NEUROLOGY

## 2023-03-20 PROCEDURE — 99214 OFFICE O/P EST MOD 30 MIN: CPT | Mod: 95,,, | Performed by: PSYCHIATRY & NEUROLOGY

## 2023-03-20 PROCEDURE — 1160F RVW MEDS BY RX/DR IN RCRD: CPT | Mod: CPTII,95,, | Performed by: PSYCHIATRY & NEUROLOGY

## 2023-03-20 PROCEDURE — 1159F MED LIST DOCD IN RCRD: CPT | Mod: CPTII,95,, | Performed by: PSYCHIATRY & NEUROLOGY

## 2023-03-20 PROCEDURE — 99214 PR OFFICE/OUTPT VISIT, EST, LEVL IV, 30-39 MIN: ICD-10-PCS | Mod: 95,,, | Performed by: PSYCHIATRY & NEUROLOGY

## 2023-03-20 RX ORDER — RIZATRIPTAN BENZOATE 10 MG/1
10 TABLET ORAL
Qty: 12 TABLET | Refills: 3 | Status: SHIPPED | OUTPATIENT
Start: 2023-03-20 | End: 2023-03-20 | Stop reason: SDUPTHER

## 2023-03-20 RX ORDER — ACETAZOLAMIDE 250 MG/1
250 TABLET ORAL DAILY
Qty: 90 TABLET | Refills: 3 | Status: SHIPPED | OUTPATIENT
Start: 2023-03-20 | End: 2023-05-09 | Stop reason: SDUPTHER

## 2023-03-20 RX ORDER — RIZATRIPTAN BENZOATE 10 MG/1
10 TABLET ORAL
Qty: 12 TABLET | Refills: 3 | Status: SHIPPED | OUTPATIENT
Start: 2023-03-20 | End: 2024-02-21 | Stop reason: SDUPTHER

## 2023-03-20 RX ORDER — ACETAZOLAMIDE 250 MG/1
250 TABLET ORAL DAILY
Qty: 90 TABLET | Refills: 3 | Status: SHIPPED | OUTPATIENT
Start: 2023-03-20 | End: 2023-03-20 | Stop reason: SDUPTHER

## 2023-03-20 NOTE — PROGRESS NOTES
[facility] - NEUROLOGY 7TH [place] [facility], [place]    Date: 3/20/23  Patient Name: Sarah Isabel   MRN: [id number]   PCP: Nini Pryor  Referring Provider: No ref. provider found    Assessment:   Sarah Isabel is a 42 y.o. female presenting in follow-up for management of IIH and migraine.  Continuing Diamox with no changes.  Maxalt provided as alternative rescue agent.  All questions   Answered.  Plan:     Problem List Items Addressed This Visit          Neuro    Migraine without aura and without status migrainosus, not intractable    Relevant Medications    acetaZOLAMIDE (DIAMOX) 250 MG tablet    rizatriptan (MAXALT) 10 MG tablet       Harris Espinosa MD  [facility]   Department of Neurology    Patient note was created using MModal Dictation.  Any errors in syntax or even information may not have been identified and edited on initial review prior to signing this note.  Subjective:     HPI:   Ms. Sarah Isabel is a 42 y.o. female With history of IIH and migraine.  Patient presenting with episodic migraine with good control of IH.  Continuing Diamox with no changes.  Maxalt provided for rescue as alternative to Fioricet. Presenting in follow-up for management of headache and IIH.  Patient has been lost to follow-up for approximately a year.  She reports she is been doing well until several months ago when she experienced an exacerbation of her headaches.  She is experiencing headache approximately once per month which she feels is tied to hormonal shifts.  She states she had 1 headache recently that it would not respond to tramadol or Fioricet.  She states she ultimately felt better after a hot shower.  She has tried Imitrex in the past but it gave her side effects of neck tightness and skin flushing She denies positional features and denies vision change.    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     Morbid obesity with BMI of 45.0-49.9, adult     Thyroid nodule 5/29/2017       PAST SURGICAL HISTORY:  Past Surgical History:   Procedure Laterality Date    ANTERIOR CERVICAL DISCECTOMY W/ FUSION  2016    ESOPHAGOGASTRODUODENOSCOPY      LAPAROSCOPIC APPENDECTOMY N/A 2/4/2019    Procedure: APPENDECTOMY, LAPAROSCOPIC;  Surgeon: Maikol Banks Jr., MD;  Location: [facility] OR;  Service: General;  Laterality: N/A;    Laparoscopic hh with Toupet      NASAL SEPTOPLASTY N/A 9/25/2020    Procedure: SEPTOPLASTY, NOSE;  Surgeon: Mike Alexander MD;  [facility];  Service: ENT;  Laterality: N/A;    NASAL TURBINATE REDUCTION Bilateral 9/25/2020    Procedure: REDUCTION, NASAL TURBINATE;  Surgeon: Mike Alexander MD;  Location: [facility] OR [facility];  Service: ENT;  Laterality: Bilateral;    TONSILLECTOMY  2007    UPPER GASTROINTESTINAL ENDOSCOPY         CURRENT MEDS:  Current Outpatient Medications   Medication Sig Dispense Refill    acetaZOLAMIDE (DIAMOX) 250 MG tablet Take 1 tablet (250 mg total) by mouth once daily. 90 tablet 3    buPROPion (WELLBUTRIN SR) 200 MG SR12 Take 1 tablet (200 mg total) by mouth once daily. 90 tablet 0    butalbital-acetaminophen-caffeine -40 mg (FIORICET, ESGIC) -40 mg per tablet Take 1 tablet by mouth 2 (two) times daily as needed for Pain. 30 tablet 0    cholecalciferol, vitamin D3, (VITAMIN D3) 50 mcg (2,000 unit) Tab Take 1 tablet (2,000 Units total) by mouth once daily. 90 each 3    cyclobenzaprine (FLEXERIL) 5 MG tablet       lamoTRIgine (LAMICTAL) 150 MG Tab Take 1 tablet (150 mg total) by mouth once daily. 90 tablet 0    rizatriptan (MAXALT) 10 MG tablet Take 1 tablet (10 mg total) by mouth as needed for Migraine. 12 tablet 3    traMADoL (ULTRAM) 50 mg  tablet Take 1 tablet (50 mg total) by mouth every 8 (eight) hours as needed for Pain. 90 tablet 3    TRINTELLIX 10 mg Tab Take 1 tablet (10 mg total) by mouth once daily. 90 tablet 0     No current facility-administered medications for this visit.       ALLERGIES:  Review of patient's allergies indicates:   Allergen Reactions    Percocet [oxycodone-acetaminophen] Nausea And Vomiting    Sulfa (sulfonamide antibiotics) Rash    Sulfadiazine     Phentermine Rash       FAMILY HISTORY:  Family History   Problem Relation Age of Onset    Hypertension Father     Diabetes Paternal Grandfather     Asthma Maternal Uncle     Asthma Cousin     Cancer Neg Hx     Heart disease Neg Hx     Stroke Neg Hx        SOCIAL HISTORY:  Social History     Tobacco Use    Smoking status: Former     Packs/day: 0.25     Years: 10.00     Pack years: 2.50     Types: Cigarettes     Quit date: 2012     Years since quittin.1     Passive exposure: Never    Smokeless tobacco: Never    Tobacco comments:     quit smoking    Substance Use Topics    Alcohol use: No    Drug use: No       Review of Systems:  12 system review of systems is negative except for the symptoms mentioned in HPI.      Objective:     General: NAD, well nourished   Eyes: no tearing, discharge, no erythema   ENT: moist mucous membranes of the oral cavity, nares patent    Neck: Supple, full range of motion  Cardiovascular: Appears well perfused  Lungs: Normal work of breathing, normal chest wall excursions  Skin: No rash, lesions, or breakdown on exposed skin  Psychiatry: Mood and affect are appropriate   Abdomen: nondistended, non tender  Extremeties: No cyanosis, clubbing or edema visible    Neurological   MENTAL STATUS: Alert and oriented to person, place, and time. Attention and concentration within normal limits. Speech without dysarthria, able to name and repeat without difficulty. Recent and remote memory within normal limits   CRANIAL NERVES: Visual fields intact.  PERRL. EOMI. Facial sensation intact. Face symmetrical. Hearing grossly intact. Full shoulder shrug bilaterally. Tongue protrudes midline   SENSORY: Sensation is intact to light touch throughout.   MOTOR: Normal bulk No pronator drift.  Moves all extremities symmetrically.  CEREBELLAR/COORDINATION/GAIT: Gait steady.

## 2023-03-23 ENCOUNTER — OFFICE VISIT (OUTPATIENT)
Dept: PODIATRY | Facility: CLINIC | Age: 43
End: 2023-03-23
Payer: COMMERCIAL

## 2023-03-23 ENCOUNTER — PATIENT MESSAGE (OUTPATIENT)
Dept: NEUROLOGY | Facility: CLINIC | Age: 43
End: 2023-03-23
Payer: COMMERCIAL

## 2023-03-23 ENCOUNTER — HOSPITAL ENCOUNTER (OUTPATIENT)
Dept: RADIOLOGY | Facility: HOSPITAL | Age: 43
Discharge: HOME | End: 2023-03-23
Attending: PODIATRIST
Payer: COMMERCIAL

## 2023-03-23 VITALS — BODY MASS INDEX: 39.95 KG/M2 | WEIGHT: 211.63 LBS | HEIGHT: 61 IN

## 2023-03-23 DIAGNOSIS — G89.29 CHRONIC PAIN OF RIGHT ANKLE: ICD-10-CM

## 2023-03-23 DIAGNOSIS — M25.571 CHRONIC PAIN OF RIGHT ANKLE: ICD-10-CM

## 2023-03-23 DIAGNOSIS — M79.671 RIGHT FOOT PAIN: ICD-10-CM

## 2023-03-23 DIAGNOSIS — M79.671 RIGHT FOOT PAIN: Primary | ICD-10-CM

## 2023-03-23 DIAGNOSIS — G43.009 MIGRAINE WITHOUT AURA AND WITHOUT STATUS MIGRAINOSUS, NOT INTRACTABLE: ICD-10-CM

## 2023-03-23 PROCEDURE — 73610 X-RAY EXAM OF ANKLE: CPT | Mod: TC,FY,PO,RT

## 2023-03-23 PROCEDURE — 99203 OFFICE O/P NEW LOW 30 MIN: CPT | Mod: S$GLB,,, | Performed by: PODIATRIST

## 2023-03-23 PROCEDURE — 3008F PR BODY MASS INDEX (BMI) DOCUMENTED: ICD-10-PCS | Mod: CPTII,S$GLB,, | Performed by: PODIATRIST

## 2023-03-23 PROCEDURE — 99999 PR PBB SHADOW E&M-EST. PATIENT-LVL III: CPT | Mod: PBBFAC,,, | Performed by: PODIATRIST

## 2023-03-23 PROCEDURE — 73630 X-RAY EXAM OF FOOT: CPT | Mod: TC,FY,PO,RT

## 2023-03-23 PROCEDURE — 73610 X-RAY EXAM OF ANKLE: CPT | Mod: 26,RT,, | Performed by: RADIOLOGY

## 2023-03-23 PROCEDURE — 73610 XR ANKLE COMPLETE 3 VIEW RIGHT: ICD-10-PCS | Mod: 26,RT,, | Performed by: RADIOLOGY

## 2023-03-23 PROCEDURE — 73630 X-RAY EXAM OF FOOT: CPT | Mod: 26,RT,, | Performed by: RADIOLOGY

## 2023-03-23 PROCEDURE — 73630 XR FOOT COMPLETE 3 VIEW RIGHT: ICD-10-PCS | Mod: 26,RT,, | Performed by: RADIOLOGY

## 2023-03-23 PROCEDURE — 3008F BODY MASS INDEX DOCD: CPT | Mod: CPTII,S$GLB,, | Performed by: PODIATRIST

## 2023-03-23 PROCEDURE — 99203 PR OFFICE/OUTPT VISIT, NEW, LEVL III, 30-44 MIN: ICD-10-PCS | Mod: S$GLB,,, | Performed by: PODIATRIST

## 2023-03-23 PROCEDURE — 99999 PR PBB SHADOW E&M-EST. PATIENT-LVL III: ICD-10-PCS | Mod: PBBFAC,,, | Performed by: PODIATRIST

## 2023-03-23 RX ORDER — BUTALBITAL, ACETAMINOPHEN AND CAFFEINE 50; 325; 40 MG/1; MG/1; MG/1
1 TABLET ORAL 2 TIMES DAILY PRN
Qty: 30 TABLET | Refills: 0 | Status: SHIPPED | OUTPATIENT
Start: 2023-03-23 | End: 2023-05-09 | Stop reason: SDUPTHER

## 2023-03-23 NOTE — PROGRESS NOTES
Subjective:      Patient ID: Sarah Isabel is a 42 y.o. female.    Chief Complaint: Foot Pain (Right foot pain ) and Ankle Pain    Sarah is a 42 y.o. female who presents to the podiatry clinic  with complaint of  right foot /ankle pain. Onset of the symptoms was several weeks ago. Precipitating event: none known. Current symptoms include: ability to bear weight, but with some pain. Aggravating factors: any weight bearing. Symptoms have progressed to a point and plateaued. Patient has had no prior foot problems. Evaluation to date: none. Treatment to date: none. Patients rates pain 5/10 on pain scale.    Review of Systems   Constitutional: Negative for chills.   Cardiovascular:  Negative for chest pain and claudication.   Respiratory:  Negative for cough.    Skin:  Positive for color change, dry skin and nail changes.   Musculoskeletal:  Positive for joint pain.   Gastrointestinal:  Negative for nausea.   Neurological:  Positive for paresthesias. Negative for numbness.   Psychiatric/Behavioral:  The patient is not nervous/anxious.          Objective:      Physical Exam  Constitutional:       Appearance: She is well-developed.      Comments: Oriented to time, place, and person.   Cardiovascular:      Comments: DP and PT pulses are palpable bilaterally. 3 sec capillary refill time and toes and feet are warm to touch proximally .  There is  hair growth on the feet and toes b/l. There is no edema b/l. No spider veins or varicosities present b/l.     Musculoskeletal:      Comments: Equinus noted b/l ankles with < 10 deg DF noted. MMT 5/5 in DF/PF/Inv/Ev resistance with no reproduction of pain in any direction. Passive range of motion of ankle and pedal joints is painless b/l.  Right lateral foot pain  right dorsal ankle pain      Feet:      Right foot:      Skin integrity: No callus or dry skin.      Left foot:      Skin integrity: No callus or dry skin.   Lymphadenopathy:      Comments: Negative lymphadenopathy  bilateral popliteal fossa and tarsal tunnel.   Skin:     Comments: No open lesions, lacerations or wounds noted.Interdigital spaces clean, dry and intact b/l. No erythema noted to b/l foot.  Nails normal color and trophic qualities.     Neurological:      Mental Status: She is alert.      Comments: Light touch, proprioception, and sharp/dull sensation are all intact bilaterally. Protective threshold with the [place]ien[place] monofilament is intact bilaterally.    Psychiatric:         Behavior: Behavior is cooperative.             Assessment:       Encounter Diagnoses   Name Primary?    Right foot pain Yes    Chronic pain of right ankle          Plan:       Sarah was seen today for foot pain and ankle pain.    Diagnoses and all orders for this visit:    Right foot pain  -     X-Ray Foot Complete Right; Future    Chronic pain of right ankle  -     X-Ray Ankle Complete Right; Future      I counseled the patient on her conditions, their implications and medical management.    Discussed conservative treatment with shoes of adequate dimensions, material, and style to alleviate symptoms and delay or prevent surgical intervention.    Dispensed, fitted and gait trained with ankle brace. Instructed to wear with supportive shoe at all times for next 3-4 weeks when placing weight on the foot.    Xray right foot/ankle ordered.     Rx Voltaren gel to be applied to affected area up to 3-4 x daily as needed for pain    RTC PRN

## 2023-05-09 DIAGNOSIS — G43.009 MIGRAINE WITHOUT AURA AND WITHOUT STATUS MIGRAINOSUS, NOT INTRACTABLE: ICD-10-CM

## 2023-05-09 RX ORDER — BUTALBITAL, ACETAMINOPHEN AND CAFFEINE 50; 325; 40 MG/1; MG/1; MG/1
1 TABLET ORAL EVERY 8 HOURS PRN
Qty: 12 TABLET | Refills: 2 | Status: SHIPPED | OUTPATIENT
Start: 2023-05-09 | End: 2024-02-21

## 2023-05-09 RX ORDER — ACETAZOLAMIDE 250 MG/1
250 TABLET ORAL DAILY
Qty: 90 TABLET | Refills: 3 | Status: SHIPPED | OUTPATIENT
Start: 2023-05-09 | End: 2024-02-09

## 2023-05-16 ENCOUNTER — OFFICE VISIT (OUTPATIENT)
Dept: FAMILY MEDICINE | Facility: CLINIC | Age: 43
End: 2023-05-16
Payer: COMMERCIAL

## 2023-05-16 ENCOUNTER — PATIENT MESSAGE (OUTPATIENT)
Dept: FAMILY MEDICINE | Facility: CLINIC | Age: 43
End: 2023-05-16
Payer: COMMERCIAL

## 2023-05-16 DIAGNOSIS — J01.10 ACUTE NON-RECURRENT FRONTAL SINUSITIS: Primary | ICD-10-CM

## 2023-05-16 PROCEDURE — 1160F PR REVIEW ALL MEDS BY PRESCRIBER/CLIN PHARMACIST DOCUMENTED: ICD-10-PCS | Mod: CPTII,95,, | Performed by: FAMILY MEDICINE

## 2023-05-16 PROCEDURE — 99213 OFFICE O/P EST LOW 20 MIN: CPT | Mod: 95,,, | Performed by: FAMILY MEDICINE

## 2023-05-16 PROCEDURE — 1160F RVW MEDS BY RX/DR IN RCRD: CPT | Mod: CPTII,95,, | Performed by: FAMILY MEDICINE

## 2023-05-16 PROCEDURE — 1159F PR MEDICATION LIST DOCUMENTED IN MEDICAL RECORD: ICD-10-PCS | Mod: CPTII,95,, | Performed by: FAMILY MEDICINE

## 2023-05-16 PROCEDURE — 1159F MED LIST DOCD IN RCRD: CPT | Mod: CPTII,95,, | Performed by: FAMILY MEDICINE

## 2023-05-16 PROCEDURE — 99213 PR OFFICE/OUTPT VISIT, EST, LEVL III, 20-29 MIN: ICD-10-PCS | Mod: 95,,, | Performed by: FAMILY MEDICINE

## 2023-05-16 RX ORDER — PROMETHAZINE HYDROCHLORIDE AND DEXTROMETHORPHAN HYDROBROMIDE 6.25; 15 MG/5ML; MG/5ML
SYRUP ORAL
Qty: 240 ML | Refills: 0 | Status: SHIPPED | OUTPATIENT
Start: 2023-05-16 | End: 2023-08-16 | Stop reason: ALTCHOICE

## 2023-05-16 RX ORDER — BENZONATATE 200 MG/1
200 CAPSULE ORAL 3 TIMES DAILY PRN
Qty: 30 CAPSULE | Refills: 1 | Status: SHIPPED | OUTPATIENT
Start: 2023-05-16 | End: 2023-08-16 | Stop reason: ALTCHOICE

## 2023-05-16 RX ORDER — FLUTICASONE PROPIONATE 50 MCG
1 SPRAY, SUSPENSION (ML) NASAL 2 TIMES DAILY
Qty: 16 G | Refills: 1 | Status: SHIPPED | OUTPATIENT
Start: 2023-05-16 | End: 2023-10-17

## 2023-05-16 NOTE — PROGRESS NOTES
Assessment & Plan  Acute non-recurrent frontal sinusitis  -     fluticasone propionate (FLONASE) 50 mcg/actuation nasal spray; 1 spray (50 mcg total) by Each Nostril route 2 (two) times a day.  Dispense: 16 g; Refill: 1  -     promethazine-dextromethorphan (PROMETHAZINE-DM) 6.25-15 mg/5 mL Syrp; Take 10-15ml by mouth every 8 hours as needed for coughing  Dispense: 240 mL; Refill: 0  -     benzonatate (TESSALON) 200 MG capsule; Take 1 capsule (200 mg total) by mouth 3 (three) times daily as needed for Cough.  Dispense: 30 capsule; Refill: 1    Etiology most likely viral.  Home COVID test negative.  Encouraged rest, fluids, nasal decongestants, cough suppressants, lozenges, Chloraseptic spray, and nasal saline/Netti pot followed by Flonase prn.       The patient location is:  Patient Home   The chief complaint leading to consultation is: noted below  Visit type: Virtual visit with synchronous audio and video  Total time spent with patient: 8 minutes  Each patient to whom he or she provides medical services by telemedicine is:  (1) informed of the relationship between the physician and patient and the respective role of any other health care provider with respect to management of the patient; and (2) notified that he or she may decline to receive medical services by telemedicine and may withdraw from such care at any time.    Follow-up: Follow up if symptoms worsen or fail to improve.  ______________________________________________________________________    Chief Complaint  Chief Complaint   Patient presents with    Sinus Problem       HPI  Sarah Isabel is a 42 y.o. female with medical diagnoses as listed in the medical history and problem list that presents in a virtual visit c/o nasal congestion.     Sinus Problem  This is a new problem. Episode onset: Sunday. Associated symptoms include congestion, coughing, ear pain (b/l), headaches, sinus pressure and a sore throat. Pertinent negatives include no chills,  shortness of breath or sneezing. Past treatments include nothing.     Just returned from a 7 day cruise on May 7th. Traveled to [place], [place], and [facility]. Home COVID test was negative last night.     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 OR [address] [place] [address];  Service: ENT;  Laterality: N/A;    NASAL TURBINATE REDUCTION Bilateral 2020    Procedure: REDUCTION, NASAL TURBINATE;  Surgeon: Mike Alexander MD;  Location: [facility] OR [address] [place] [address];  Service: ENT;  Laterality: Bilateral;    TONSILLECTOMY      UPPER GASTROINTESTINAL ENDOSCOPY         SOCIAL HISTORY:  Social History     Socioeconomic History    Marital status:     Number of children: 1   Occupational History    Occupation: HR supervisor   Tobacco Use    Smoking status: Former     Packs/day: 0.25     Years: 10.00     Pack years: 2.50     Types: Cigarettes     Quit date: 2012     Years since quittin.2     Passive exposure: Never    Smokeless tobacco: Never    Tobacco comments:     quit smoking    Substance and Sexual Activity    Alcohol use: No    Drug use: No     Social Determinants of Health     Financial Resource Strain: Low Risk     Difficulty of  Paying Living Expenses: Not hard at all   Food Insecurity: No Food Insecurity    Worried About Running Out of Food in the Last Year: Never true    Ran Out of Food in the Last Year: Never true   Transportation Needs: No Transportation Needs    Lack of Transportation (Medical): No    Lack of Transportation (Non-Medical): No   Physical Activity: Insufficiently Active    Days of Exercise per Week: 2 days    Minutes of Exercise per Session: 30 min   Stress: No Stress Concern Present    Feeling of Stress : Only a little   Social Connections: Unknown    Frequency of Communication with Friends and Family: More than three times a week    Frequency of Social Gatherings with Friends and Family: Once a week    Active Member of Clubs or Organizations: No    Attends Club or Organization Meetings: Never    Marital Status:    Housing Stability: Low Risk     Unable to Pay for Housing in the Last Year: No    Number of Places Lived in the Last Year: 1    Unstable Housing in the Last Year: No       FAMILY HISTORY:  Family History   Problem Relation Age of Onset    Hypertension Father     Diabetes Paternal Grandfather     Asthma Maternal Uncle     Asthma Cousin     Cancer Neg Hx     Heart disease Neg Hx     Stroke Neg Hx        ALLERGIES AND MEDICATIONS: updated and reviewed.  Review of patient's allergies indicates:   Allergen Reactions    Percocet [oxycodone-acetaminophen] Nausea And Vomiting    Sulfa (sulfonamide antibiotics) Rash    Sulfadiazine     Phentermine Rash     Current Outpatient Medications   Medication Sig Dispense Refill    acetaZOLAMIDE (DIAMOX) 250 MG tablet Take 1 tablet (250 mg total) by mouth once daily. 90 tablet 3    benzonatate (TESSALON) 200 MG capsule Take 1 capsule (200 mg total) by mouth 3 (three) times daily as needed for Cough. 30 capsule 1    buPROPion (WELLBUTRIN SR) 200 MG SR12 Take 1 tablet (200 mg total) by mouth once daily. 90 tablet 0    butalbital-acetaminophen-caffeine -40 mg  (FIORICET, ESGIC) -40 mg per tablet Take 1 tablet by mouth every 8 (eight) hours as needed for Pain. DO NOT EXCEED 3 TABS PER WEEK 12 tablet 2    cholecalciferol, vitamin D3, (VITAMIN D3) 50 mcg (2,000 unit) Tab Take 1 tablet (2,000 Units total) by mouth once daily. 90 each 3    cyclobenzaprine (FLEXERIL) 5 MG tablet       fluticasone propionate (FLONASE) 50 mcg/actuation nasal spray 1 spray (50 mcg total) by Each Nostril route 2 (two) times a day. 16 g 1    lamoTRIgine (LAMICTAL) 150 MG Tab Take 1 tablet (150 mg total) by mouth once daily. 90 tablet 0    promethazine-dextromethorphan (PROMETHAZINE-DM) 6.25-15 mg/5 mL Syrp Take 10-15ml by mouth every 8 hours as needed for coughing 240 mL 0    rizatriptan (MAXALT) 10 MG tablet Take 1 tablet (10 mg total) by mouth as needed for Migraine. 12 tablet 3    traMADoL (ULTRAM) 50 mg tablet Take 1 tablet (50 mg total) by mouth every 8 (eight) hours as needed for Pain. 90 tablet 3    TRINTELLIX 10 mg Tab Take 1 tablet (10 mg total) by mouth once daily. 90 tablet 0     No current facility-administered medications for this visit.         ROS  Review of Systems   Constitutional:  Negative for activity change, appetite change, chills and fever.   HENT:  Positive for congestion, ear pain (b/l), sinus pressure, sinus pain and sore throat. Negative for postnasal drip, rhinorrhea, sneezing and voice change.    Eyes:  Negative for discharge, redness and itching.   Respiratory:  Positive for cough. Negative for shortness of breath and wheezing.    Musculoskeletal:  Positive for myalgias.   Skin:  Negative for color change and rash.   Neurological:  Positive for headaches. Negative for dizziness.   Hematological:  Negative for adenopathy.         Physical Exam  There were no vitals filed for this visit. There is no height or weight on file to calculate BMI.          Physical Exam  Constitutional:       General: She is not in acute distress.  HENT:      Head: Normocephalic and  atraumatic.      Mouth/Throat:      Pharynx: Oropharynx is clear.   Neurological:      Mental Status: She is alert. Mental status is at baseline.   Psychiatric:         Mood and Affect: Mood normal.         Behavior: Behavior normal.     *Physical exam limited by virtual visit

## 2023-05-18 ENCOUNTER — PATIENT MESSAGE (OUTPATIENT)
Dept: FAMILY MEDICINE | Facility: CLINIC | Age: 43
End: 2023-05-18
Payer: COMMERCIAL

## 2023-05-18 RX ORDER — AMOXICILLIN AND CLAVULANATE POTASSIUM 875; 125 MG/1; MG/1
1 TABLET, FILM COATED ORAL EVERY 12 HOURS
Qty: 14 TABLET | Refills: 0 | Status: SHIPPED | OUTPATIENT
Start: 2023-05-18 | End: 2023-05-25

## 2023-06-20 ENCOUNTER — TELEPHONE (OUTPATIENT)
Dept: FAMILY MEDICINE | Facility: CLINIC | Age: 43
End: 2023-06-20
Payer: COMMERCIAL

## 2023-06-20 NOTE — TELEPHONE ENCOUNTER
Left a voicemail message for the Patient to call the office back to be rescheduled for OV.  Dr. Barr, will not be in the office on 096/06/23.

## 2023-06-23 ENCOUNTER — TELEPHONE (OUTPATIENT)
Dept: FAMILY MEDICINE | Facility: CLINIC | Age: 43
End: 2023-06-23
Payer: COMMERCIAL

## 2023-06-23 NOTE — TELEPHONE ENCOUNTER
Left a voicemail message to inform the Patient about the EAWV appointment and to schedule.  Requested the Patient to call the office back to be schedule.  Sent a detailed message thru [facility] as well.

## 2023-06-26 ENCOUNTER — PATIENT MESSAGE (OUTPATIENT)
Dept: FAMILY MEDICINE | Facility: CLINIC | Age: 43
End: 2023-06-26
Payer: COMMERCIAL

## 2023-08-11 ENCOUNTER — TELEPHONE (OUTPATIENT)
Dept: FAMILY MEDICINE | Facility: CLINIC | Age: 43
End: 2023-08-11
Payer: COMMERCIAL

## 2023-08-11 NOTE — TELEPHONE ENCOUNTER
Patient appointment scheduled for 10/20/23. Provider is currently not accepting new patients. Patients need reschedule.

## 2023-08-15 ENCOUNTER — PATIENT MESSAGE (OUTPATIENT)
Dept: PRIMARY CARE CLINIC | Facility: CLINIC | Age: 43
End: 2023-08-15
Payer: COMMERCIAL

## 2023-08-15 ENCOUNTER — TELEPHONE (OUTPATIENT)
Dept: PRIMARY CARE CLINIC | Facility: CLINIC | Age: 43
End: 2023-08-15
Payer: COMMERCIAL

## 2023-08-15 NOTE — TELEPHONE ENCOUNTER
----- Message from Gerri Pryor sent at 8/15/2023 11:27 AM CDT -----  Contact: [phone]  Pt is calling in regards to a symptom she is having. Per pt, she has tingling in both of her feet. Pt also states that they are red dots not like a rash(its under the skin). She would like a callback to be advised.                      Thank you

## 2023-08-16 ENCOUNTER — OFFICE VISIT (OUTPATIENT)
Dept: PRIMARY CARE CLINIC | Facility: CLINIC | Age: 43
End: 2023-08-16
Payer: COMMERCIAL

## 2023-08-16 VITALS
HEART RATE: 92 BPM | BODY MASS INDEX: 42.24 KG/M2 | SYSTOLIC BLOOD PRESSURE: 116 MMHG | RESPIRATION RATE: 18 BRPM | HEIGHT: 61 IN | WEIGHT: 223.75 LBS | TEMPERATURE: 98 F | OXYGEN SATURATION: 97 % | DIASTOLIC BLOOD PRESSURE: 78 MMHG

## 2023-08-16 DIAGNOSIS — D68.51 FACTOR 5 LEIDEN MUTATION, HETEROZYGOUS: ICD-10-CM

## 2023-08-16 DIAGNOSIS — F31.9 BIPOLAR 1 DISORDER: ICD-10-CM

## 2023-08-16 DIAGNOSIS — Z12.31 SCREENING MAMMOGRAM FOR BREAST CANCER: ICD-10-CM

## 2023-08-16 DIAGNOSIS — Z13.220 ENCOUNTER FOR LIPID SCREENING FOR CARDIOVASCULAR DISEASE: ICD-10-CM

## 2023-08-16 DIAGNOSIS — Z00.00 ANNUAL PHYSICAL EXAM: Primary | ICD-10-CM

## 2023-08-16 DIAGNOSIS — Z98.890 S/P LAPAROSCOPIC FUNDOPLICATION: ICD-10-CM

## 2023-08-16 DIAGNOSIS — R10.2 PELVIC CRAMPING: ICD-10-CM

## 2023-08-16 DIAGNOSIS — R10.12 LEFT UPPER QUADRANT ABDOMINAL PAIN: ICD-10-CM

## 2023-08-16 DIAGNOSIS — K44.9 HIATAL HERNIA: ICD-10-CM

## 2023-08-16 DIAGNOSIS — R21 RASH OF BOTH FEET: ICD-10-CM

## 2023-08-16 DIAGNOSIS — E61.1 IRON DEFICIENCY: ICD-10-CM

## 2023-08-16 DIAGNOSIS — E55.9 VITAMIN D DEFICIENCY: ICD-10-CM

## 2023-08-16 DIAGNOSIS — Z13.6 ENCOUNTER FOR LIPID SCREENING FOR CARDIOVASCULAR DISEASE: ICD-10-CM

## 2023-08-16 LAB
BILIRUB SERPL-MCNC: NEGATIVE MG/DL
BLOOD URINE, POC: NEGATIVE
CLARITY, POC UA: CLEAR
COLOR, POC UA: YELLOW
GLUCOSE UR QL STRIP: NORMAL
KETONES UR QL STRIP: NEGATIVE
LEUKOCYTE ESTERASE URINE, POC: NEGATIVE
NITRITE, POC UA: NEGATIVE
PH, POC UA: 6
PROTEIN, POC: NORMAL
SPECIFIC GRAVITY, POC UA: 1.01
UROBILINOGEN, POC UA: NORMAL

## 2023-08-16 PROCEDURE — 3008F PR BODY MASS INDEX (BMI) DOCUMENTED: ICD-10-PCS | Mod: CPTII,S$GLB,, | Performed by: FAMILY MEDICINE

## 2023-08-16 PROCEDURE — 3074F PR MOST RECENT SYSTOLIC BLOOD PRESSURE < 130 MM HG: ICD-10-PCS | Mod: CPTII,S$GLB,, | Performed by: FAMILY MEDICINE

## 2023-08-16 PROCEDURE — 3078F PR MOST RECENT DIASTOLIC BLOOD PRESSURE < 80 MM HG: ICD-10-PCS | Mod: CPTII,S$GLB,, | Performed by: FAMILY MEDICINE

## 2023-08-16 PROCEDURE — 3078F DIAST BP <80 MM HG: CPT | Mod: CPTII,S$GLB,, | Performed by: FAMILY MEDICINE

## 2023-08-16 PROCEDURE — 81002 POCT URINE DIPSTICK WITHOUT MICROSCOPE: ICD-10-PCS | Mod: S$GLB,,, | Performed by: FAMILY MEDICINE

## 2023-08-16 PROCEDURE — 1159F PR MEDICATION LIST DOCUMENTED IN MEDICAL RECORD: ICD-10-PCS | Mod: CPTII,S$GLB,, | Performed by: FAMILY MEDICINE

## 2023-08-16 PROCEDURE — 81002 URINALYSIS NONAUTO W/O SCOPE: CPT | Mod: S$GLB,,, | Performed by: FAMILY MEDICINE

## 2023-08-16 PROCEDURE — 99999 PR PBB SHADOW E&M-EST. PATIENT-LVL V: ICD-10-PCS | Mod: PBBFAC,,, | Performed by: FAMILY MEDICINE

## 2023-08-16 PROCEDURE — 3008F BODY MASS INDEX DOCD: CPT | Mod: CPTII,S$GLB,, | Performed by: FAMILY MEDICINE

## 2023-08-16 PROCEDURE — 1159F MED LIST DOCD IN RCRD: CPT | Mod: CPTII,S$GLB,, | Performed by: FAMILY MEDICINE

## 2023-08-16 PROCEDURE — 99396 PR PREVENTIVE VISIT,EST,40-64: ICD-10-PCS | Mod: S$GLB,,, | Performed by: FAMILY MEDICINE

## 2023-08-16 PROCEDURE — 99396 PREV VISIT EST AGE 40-64: CPT | Mod: S$GLB,,, | Performed by: FAMILY MEDICINE

## 2023-08-16 PROCEDURE — 3074F SYST BP LT 130 MM HG: CPT | Mod: CPTII,S$GLB,, | Performed by: FAMILY MEDICINE

## 2023-08-16 PROCEDURE — 99999 PR PBB SHADOW E&M-EST. PATIENT-LVL V: CPT | Mod: PBBFAC,,, | Performed by: FAMILY MEDICINE

## 2023-08-16 RX ORDER — LORAZEPAM 0.5 MG/1
0.5 TABLET ORAL DAILY PRN
COMMUNITY
Start: 2023-07-25

## 2023-08-16 NOTE — PROGRESS NOTES
"Subjective:       Patient ID: Sarah Isabel is a 43 y.o. female.    Chief Complaint: Rash (Have a red like rash on both of her feet./Feet doesn't hurt/No numbness /When she stand barefoot it feel like little pins are poking her feet  ), Abdominal Cramping (Started having cramps last night /States that she haven't had a period in 2020 she had an ablation /States that she never get cramps /), and Establish Care    HPI43 y/o female former smoker (quit 2012) with migraines, hx of GERD s/p fundolipation in 3/2017, hx of thyroid nodules, Factor V Leiden def (dx in 2006), CLBP, Bipolar 1, Pseudotumor Cerebri is here for rash. Her last visit with me was over 3 years ago.    She noticed R spots on the bottoms of her feet Monday R>L, has gotten a little worse on R foot since Monday, she is barefoot only in the shower and at that time it is sharp pains that are mild, does not feel it when wearing shoes, the red spots do not itch, never were fluid filled, she denies f, she had pelvic cramps last night off and on for a few hours, today she has some achiness in her pelvis, she had mild nausea that has resolved, she denies v, last night she had a looser stool which is a normal varient for her, she denies constipation/cp/sob/urinary sx. She has been dealing with a migraine since Sat, feels like her typical ones, Fioricet helps.    She reports for 2 weeks she has had LUQ pain off and on, pain is a "sticking" pain, occurs a few times a day and lasts a few hours each time, 4/10, not associated with stooling or meals.    She recently had gum infection from crab claw scraping area, she took a 3 days of Keflex and her gum healed and she does not have pain    12/2020 uterine ablation and tubal ligation    Last travel was a cruise in May    Hx of Covid 2021 outpatient fully recovered  IBS-D: following with GI  Hx of hiatal hernia/gastritis s/p lap fundolipation: omeprazole prn, using once every 2 weeks  Migraines: following with " "neurology, Diamox, did not tolerate Imitrex, has not tried Maxalt  Vitamin D def: D3 2,000 IU daily  Hx of thyroid nodules:US 5/2017  Bipolar 1: followed by Dr. Mata, lamotrigine 150 mg bid, wellbutrin 200 mg bid, trintellix 10 mg daily, ativan prn (rare use as it makes her tired)  CLBP and Neck pain: following with Dr. Stern, tramadol 50 mg prn, did not tolerate flexeril secondary to fatigue  GYN: pelvic overdue, mmg overdue, has upcoming visit with Catrachita Catalan  [facility] exam utd [facility]  Dental utd              Review of Systems    Objective:      /78 (BP Location: Left arm, Patient Position: Sitting, BP Method: Medium (Manual))   Pulse 92   Temp 97.8 °F (36.6 °C) (Oral)   Resp 18   Ht 5' 1" (1.549 m)   Wt 101.5 kg (223 lb 12.3 oz)   SpO2 97%   BMI 42.28 kg/m²   Physical Exam  Vitals and nursing note reviewed.   Constitutional:       Appearance: She is well-developed.   HENT:      Head: Normocephalic and atraumatic.      Right Ear: Tympanic membrane normal.      Left Ear: Tympanic membrane normal.      Mouth/Throat:      Pharynx: No oropharyngeal exudate or posterior oropharyngeal erythema.   Neck:      Thyroid: No thyromegaly.   Cardiovascular:      Rate and Rhythm: Normal rate and regular rhythm.      Heart sounds: Normal heart sounds.   Pulmonary:      Effort: Pulmonary effort is normal. No respiratory distress.      Breath sounds: Normal breath sounds.   Abdominal:      General: Abdomen is flat. Bowel sounds are normal. There is no distension.      Palpations: There is no mass.      Tenderness: There is abdominal tenderness (L upper).   Musculoskeletal:      Cervical back: Normal range of motion and neck supple.      Right lower leg: No edema.      Left lower leg: No edema.   Lymphadenopathy:      Cervical: No cervical adenopathy.   Skin:     General: Skin is warm and dry.   Neurological:      Mental Status: She is alert.         Assessment:       1. Annual physical exam    2. Pelvic cramping "    3. Encounter for lipid screening for cardiovascular disease    4. Vitamin D deficiency    5. Hiatal hernia    6. Screening mammogram for breast cancer    7. S/P laparoscopic fundoplication    8. Left upper quadrant abdominal pain    9. Rash of both feet        Plan:   Sarah was seen today for rash, abdominal cramping and establish care.    Diagnoses and all orders for this visit:    Annual physical exam  -     CBC Auto Differential; Future  -     Comprehensive Metabolic Panel; Future  -     Hemoglobin A1C; Future  -     Lipid Panel; Future  -     TSH; Future  -     Vitamin D; Future    Pelvic cramping  -     POCT urine dipstick without microscope  -     Cancel: US Transvaginal Non OB; Future    Encounter for lipid screening for cardiovascular disease  -     Lipid Panel; Future    Vitamin D deficiency  -     Vitamin D; Future    Hiatal hernia    Screening mammogram for breast cancer  -     Mammo Digital Screening Bilat w/ Sarabjit; Future    S/P laparoscopic fundoplication    Left upper quadrant abdominal pain  -     US Abdomen Complete; Future    Rash of both feet  -     Ambulatory referral/consult to Dermatology; Future

## 2023-08-17 ENCOUNTER — HOSPITAL ENCOUNTER (OUTPATIENT)
Dept: RADIOLOGY | Facility: HOSPITAL | Age: 43
Discharge: HOME | End: 2023-08-17
Attending: FAMILY MEDICINE
Payer: COMMERCIAL

## 2023-08-17 ENCOUNTER — PATIENT MESSAGE (OUTPATIENT)
Dept: PRIMARY CARE CLINIC | Facility: CLINIC | Age: 43
End: 2023-08-17
Payer: COMMERCIAL

## 2023-08-17 DIAGNOSIS — R10.2 PELVIC CRAMPING: ICD-10-CM

## 2023-08-17 DIAGNOSIS — R10.12 LEFT UPPER QUADRANT ABDOMINAL PAIN: ICD-10-CM

## 2023-08-17 PROCEDURE — 76830 TRANSVAGINAL US NON-OB: CPT | Mod: 26,,, | Performed by: RADIOLOGY

## 2023-08-17 PROCEDURE — 76856 US EXAM PELVIC COMPLETE: CPT | Mod: TC

## 2023-08-17 PROCEDURE — 76856 US PELVIS COMP WITH TRANSVAG NON-OB (XPD): ICD-10-PCS | Mod: 26,,, | Performed by: RADIOLOGY

## 2023-08-17 PROCEDURE — 76856 US EXAM PELVIC COMPLETE: CPT | Mod: 26,,, | Performed by: RADIOLOGY

## 2023-08-17 PROCEDURE — 76700 US EXAM ABDOM COMPLETE: CPT | Mod: TC

## 2023-08-17 PROCEDURE — 76830 US PELVIS COMP WITH TRANSVAG NON-OB (XPD): ICD-10-PCS | Mod: 26,,, | Performed by: RADIOLOGY

## 2023-08-17 PROCEDURE — 76700 US EXAM ABDOM COMPLETE: CPT | Mod: 26,,, | Performed by: RADIOLOGY

## 2023-08-17 PROCEDURE — 76700 US ABDOMEN COMPLETE: ICD-10-PCS | Mod: 26,,, | Performed by: RADIOLOGY

## 2023-08-25 ENCOUNTER — TELEPHONE (OUTPATIENT)
Dept: PRIMARY CARE CLINIC | Facility: CLINIC | Age: 43
End: 2023-08-25
Payer: COMMERCIAL

## 2023-08-25 DIAGNOSIS — K76.0 FATTY LIVER: Primary | ICD-10-CM

## 2023-08-25 NOTE — TELEPHONE ENCOUNTER
Pt aware of results. Hepatology referral wasn't entered. Referral placed. Referral forwarded to referral coordinators for scheduling.      Current and Past Psychiatric Diagnoses/Presenting Symptoms/Historical Factors/Treatment Related Factors/Activating Events/Stressors

## 2023-08-25 NOTE — TELEPHONE ENCOUNTER
----- Message from Nini Pryor MD sent at 8/17/2023  2:11 PM CDT -----  Your abdominal ultrasound shows changes consistent with fatty liver but otherwise looks okay, I recommend you set up a visit with hepatology for further evaluation of fatty liver, I will have my staff reach out to help you schedule.

## 2023-10-13 ENCOUNTER — PATIENT MESSAGE (OUTPATIENT)
Dept: PRIMARY CARE CLINIC | Facility: CLINIC | Age: 43
End: 2023-10-13
Payer: COMMERCIAL

## 2023-10-17 ENCOUNTER — OFFICE VISIT (OUTPATIENT)
Dept: PRIMARY CARE CLINIC | Facility: CLINIC | Age: 43
End: 2023-10-17
Payer: COMMERCIAL

## 2023-10-17 DIAGNOSIS — R19.7 DIARRHEA, UNSPECIFIED TYPE: Primary | ICD-10-CM

## 2023-10-17 DIAGNOSIS — M54.50 ACUTE BILATERAL LOW BACK PAIN WITHOUT SCIATICA: ICD-10-CM

## 2023-10-17 DIAGNOSIS — R30.0 DYSURIA: ICD-10-CM

## 2023-10-17 DIAGNOSIS — R11.0 NAUSEA: ICD-10-CM

## 2023-10-17 DIAGNOSIS — K76.0 HEPATIC STEATOSIS: ICD-10-CM

## 2023-10-17 DIAGNOSIS — R10.30 LOWER ABDOMINAL PAIN: ICD-10-CM

## 2023-10-17 PROCEDURE — 99214 OFFICE O/P EST MOD 30 MIN: CPT | Mod: 95,,, | Performed by: FAMILY MEDICINE

## 2023-10-17 PROCEDURE — 99214 PR OFFICE/OUTPT VISIT, EST, LEVL IV, 30-39 MIN: ICD-10-PCS | Mod: 95,,, | Performed by: FAMILY MEDICINE

## 2023-10-17 PROCEDURE — 1159F PR MEDICATION LIST DOCUMENTED IN MEDICAL RECORD: ICD-10-PCS | Mod: CPTII,95,, | Performed by: FAMILY MEDICINE

## 2023-10-17 PROCEDURE — 1159F MED LIST DOCD IN RCRD: CPT | Mod: CPTII,95,, | Performed by: FAMILY MEDICINE

## 2023-10-17 PROCEDURE — 3044F HG A1C LEVEL LT 7.0%: CPT | Mod: CPTII,95,, | Performed by: FAMILY MEDICINE

## 2023-10-17 PROCEDURE — 3044F PR MOST RECENT HEMOGLOBIN A1C LEVEL <7.0%: ICD-10-PCS | Mod: CPTII,95,, | Performed by: FAMILY MEDICINE

## 2023-10-17 RX ORDER — NITROFURANTOIN 25; 75 MG/1; MG/1
100 CAPSULE ORAL 2 TIMES DAILY
Qty: 14 CAPSULE | Refills: 0 | Status: SHIPPED | OUTPATIENT
Start: 2023-10-17 | End: 2023-10-24

## 2023-10-17 RX ORDER — ONDANSETRON 4 MG/1
8 TABLET, FILM COATED ORAL EVERY 8 HOURS PRN
Qty: 30 TABLET | Refills: 0 | Status: SHIPPED | OUTPATIENT
Start: 2023-10-17 | End: 2024-02-21

## 2023-10-17 NOTE — PROGRESS NOTES
Subjective:       Patient ID: Sarah Isabel is a 43 y.o. female.    Chief Complaint: Nausea and Diarrhea (Pt started feeling sick last Friday, she has really bad diarrhea, and very nauseous, and started feeling dizzy. Friday she was also vomiting and Saturday she had a fever, body aches, and chills. )    Nausea  Associated symptoms include nausea.   Diarrhea       42 y/o female former smoker (quit 2012) with migraines, hx of GERD s/p fundolipation in 3/2017, hx of thyroid nodules, Factor V Leiden def (dx in 2006), CLBP, Bipolar 1, Pseudotumor Cerebri is here for diarrhea and n/v.    She started feeling bad Friday morning, she has had nausea, vomiting and worsening of her baseline diarrhea. She had fever on Saturday only it was subjective. She only had vomiting on Friday and it was foodstuffs. She still has nausea all the time that is worse after meals, she has had diarrhea 10 + times a day, its yellowish and mucus. She has been taking 2-3 immodium daily. She has lower abdominal pain is this is on R and L sides, its sharp 3/10, comes and goes. She also endorses lower back pain has been constant throbbing, better in fetal position, worse sitting. She denies numbness or tingling, she feels weak all over. She has had some dizziness, she denies feeling like she will pass out, denies vertigo sx. She also has burning with urination, decreased urine output. Appetite decreased. She is trying to drink and stay hydrated. She denies sob/cp. She is more tired, not sleeping  well, had an episode of stool incontinence. Home covid test negative. No travel or sick contacts.    Since last visit abdominal US showed fatty liver and transvaginal US show uterine fibroids    She has gyn visit set 10/30     Hx of Covid 2021 outpatient fully recovered  IBS-D: following with GI  Fatty liver: due for visit with hepatology  Hx of hiatal hernia/gastritis s/p lap fundolipation: omeprazole prn, using once every 2 weeks  Migraines: following  with neurology, Diamox, did not tolerate Imitrex  Vitamin D def: D3 2,000 IU daily  Hx of thyroid nodules:US 5/2017  Bipolar 1: followed by Dr. Mata, lamotrigine 150 mg bid, wellbutrin 200 mg bid, trintellix 10 mg daily, ativan prn (rare use as it makes her tired)  CLBP and Neck pain: following with Dr. Stern, tramadol 50 mg prn, did not tolerate flexeril secondary to fatigue  GYN: Uterine fibroids, pelvic overdue, mmg overdue, has upcoming visit with Catrachita Catalan  Eye exam utd [facility]  Dental utd    Review of Systems   Gastrointestinal:  Positive for diarrhea and nausea.     see HPI  Objective:      There were no vitals taken for this visit.  Physical Exam  Constitutional:       General: She is not in acute distress.     Appearance: She is well-developed. She is not diaphoretic.   HENT:      Head: Normocephalic and atraumatic.   Pulmonary:      Effort: No respiratory distress.   Neurological:      Mental Status: She is alert and oriented to person, place, and time.         Assessment:       1. Diarrhea, unspecified type    2. Lower abdominal pain    3. Acute bilateral low back pain without sciatica    4. Nausea    5. Hepatic steatosis    6. Dysuria        Plan:   Sarah was seen today for nausea and diarrhea.    Diagnoses and all orders for this visit:    Diarrhea, unspecified type  -     Comprehensive Metabolic Panel; Future  -     CBC Auto Differential; Future  -     Giardia / Cryptosporidum, EIA; Future  -     Clostridium difficile EIA; Future  -     WBC, Stool; Future  -     Stool Exam-Ova,Cysts,Parasites; Future  -     Stool culture; Future  -     Ambulatory referral/consult to Gastroenterology; Future    Lower abdominal pain  -     Ambulatory referral/consult to Gastroenterology; Future    Acute bilateral low back pain without sciatica  -     X-Ray Lumbar Spine 5 View; Future    Nausea  -     Ambulatory referral/consult to Gastroenterology; Future  -     ondansetron (ZOFRAN) 4 MG tablet; Take 2  tablets (8 mg total) by mouth every 8 (eight) hours as needed for Nausea.    Hepatic steatosis    Dysuria  -     Urinalysis; Future  -     Urine culture; Future  -     nitrofurantoin, macrocrystal-monohydrate, (MACROBID) 100 MG capsule; Take 1 capsule (100 mg total) by mouth 2 (two) times daily. for 7 days      The patient location is: [place]  The chief complaint leading to consultation is: diarrhea    Visit type: audiovisual    Face to Face time with patient: 25 minutes of total time spent on the encounter, which includes face to face time and non-face to face time preparing to see the patient (eg, review of tests), Obtaining and/or reviewing separately obtained history, Documenting clinical information in the electronic or other health record, Independently interpreting results (not separately reported) and communicating results to the patient/family/caregiver, or Care coordination (not separately reported).         Each patient to whom he or she provides medical services by telemedicine is:  (1) informed of the relationship between the physician and patient and the respective role of any other health care provider with respect to management of the patient; and (2) notified that he or she may decline to receive medical services by telemedicine and may withdraw from such care at any time.    Notes:

## 2023-10-18 ENCOUNTER — LAB VISIT (OUTPATIENT)
Dept: LAB | Facility: HOSPITAL | Age: 43
End: 2023-10-18
Attending: FAMILY MEDICINE
Payer: COMMERCIAL

## 2023-10-18 DIAGNOSIS — R19.7 DIARRHEA, UNSPECIFIED TYPE: ICD-10-CM

## 2023-10-18 LAB
ALBUMIN SERPL BCP-MCNC: 3.7 G/DL (ref 3.5–5.2)
ALP SERPL-CCNC: 90 U/L (ref 55–135)
ALT SERPL W/O P-5'-P-CCNC: 67 U/L (ref 10–44)
ANION GAP SERPL CALC-SCNC: 11 MMOL/L (ref 8–16)
AST SERPL-CCNC: 33 U/L (ref 10–40)
BASOPHILS # BLD AUTO: 0.03 K/UL (ref 0–0.2)
BASOPHILS NFR BLD: 0.7 % (ref 0–1.9)
BILIRUB SERPL-MCNC: 0.2 MG/DL (ref 0.1–1)
BUN SERPL-MCNC: 8 MG/DL (ref 6–20)
CALCIUM SERPL-MCNC: 8.8 MG/DL (ref 8.7–10.5)
CHLORIDE SERPL-SCNC: 109 MMOL/L (ref 95–110)
CO2 SERPL-SCNC: 20 MMOL/L (ref 23–29)
CREAT SERPL-MCNC: 1 MG/DL (ref 0.5–1.4)
DIFFERENTIAL METHOD: NORMAL
EOSINOPHIL # BLD AUTO: 0.2 K/UL (ref 0–0.5)
EOSINOPHIL NFR BLD: 3.4 % (ref 0–8)
ERYTHROCYTE [DISTWIDTH] IN BLOOD BY AUTOMATED COUNT: 13.2 % (ref 11.5–14.5)
EST. GFR  (NO RACE VARIABLE): >60 ML/MIN/1.73 M^2
GLUCOSE SERPL-MCNC: 76 MG/DL (ref 70–110)
HCT VFR BLD AUTO: 42.5 % (ref 37–48.5)
HGB BLD-MCNC: 13.8 G/DL (ref 12–16)
IMM GRANULOCYTES # BLD AUTO: 0.01 K/UL (ref 0–0.04)
IMM GRANULOCYTES NFR BLD AUTO: 0.2 % (ref 0–0.5)
LYMPHOCYTES # BLD AUTO: 1.8 K/UL (ref 1–4.8)
LYMPHOCYTES NFR BLD: 41.6 % (ref 18–48)
MCH RBC QN AUTO: 28.6 PG (ref 27–31)
MCHC RBC AUTO-ENTMCNC: 32.5 G/DL (ref 32–36)
MCV RBC AUTO: 88 FL (ref 82–98)
MONOCYTES # BLD AUTO: 0.5 K/UL (ref 0.3–1)
MONOCYTES NFR BLD: 12.3 % (ref 4–15)
NEUTROPHILS # BLD AUTO: 1.8 K/UL (ref 1.8–7.7)
NEUTROPHILS NFR BLD: 41.8 % (ref 38–73)
NRBC BLD-RTO: 0 /100 WBC
PLATELET # BLD AUTO: 263 K/UL (ref 150–450)
PLATELET BLD QL SMEAR: NORMAL
PMV BLD AUTO: 9.6 FL (ref 9.2–12.9)
POTASSIUM SERPL-SCNC: 3.3 MMOL/L (ref 3.5–5.1)
PROT SERPL-MCNC: 7.1 G/DL (ref 6–8.4)
RBC # BLD AUTO: 4.83 M/UL (ref 4–5.4)
SODIUM SERPL-SCNC: 140 MMOL/L (ref 136–145)
WBC # BLD AUTO: 4.38 K/UL (ref 3.9–12.7)

## 2023-10-18 PROCEDURE — 85025 COMPLETE CBC W/AUTO DIFF WBC: CPT | Performed by: FAMILY MEDICINE

## 2023-10-18 PROCEDURE — 36415 COLL VENOUS BLD VENIPUNCTURE: CPT | Mod: PN | Performed by: FAMILY MEDICINE

## 2023-10-18 PROCEDURE — 80053 COMPREHEN METABOLIC PANEL: CPT | Performed by: FAMILY MEDICINE

## 2023-10-19 ENCOUNTER — PATIENT MESSAGE (OUTPATIENT)
Dept: PRIMARY CARE CLINIC | Facility: CLINIC | Age: 43
End: 2023-10-19
Payer: COMMERCIAL

## 2023-10-24 DIAGNOSIS — M79.641 BILATERAL HAND PAIN: Primary | ICD-10-CM

## 2023-10-24 DIAGNOSIS — M79.642 BILATERAL HAND PAIN: Primary | ICD-10-CM

## 2023-10-30 ENCOUNTER — OFFICE VISIT (OUTPATIENT)
Dept: ORTHOPEDICS | Facility: CLINIC | Age: 43
End: 2023-10-30
Payer: COMMERCIAL

## 2023-10-30 ENCOUNTER — HOSPITAL ENCOUNTER (OUTPATIENT)
Dept: RADIOLOGY | Facility: HOSPITAL | Age: 43
Discharge: HOME | End: 2023-10-30
Attending: ORTHOPAEDIC SURGERY
Payer: COMMERCIAL

## 2023-10-30 DIAGNOSIS — M18.11 PRIMARY OSTEOARTHRITIS OF FIRST CARPOMETACARPAL JOINT OF RIGHT HAND: Primary | ICD-10-CM

## 2023-10-30 DIAGNOSIS — M79.641 BILATERAL HAND PAIN: ICD-10-CM

## 2023-10-30 DIAGNOSIS — M79.644 THUMB PAIN, RIGHT: ICD-10-CM

## 2023-10-30 DIAGNOSIS — M79.642 BILATERAL HAND PAIN: ICD-10-CM

## 2023-10-30 PROCEDURE — 1160F RVW MEDS BY RX/DR IN RCRD: CPT | Mod: CPTII,S$GLB,, | Performed by: ORTHOPAEDIC SURGERY

## 2023-10-30 PROCEDURE — 99204 PR OFFICE/OUTPT VISIT, NEW, LEVL IV, 45-59 MIN: ICD-10-PCS | Mod: 25,S$GLB,, | Performed by: ORTHOPAEDIC SURGERY

## 2023-10-30 PROCEDURE — 73130 XR HAND COMPLETE 3 VIEWS BILATERAL: ICD-10-PCS | Mod: 26,RT,, | Performed by: RADIOLOGY

## 2023-10-30 PROCEDURE — 73130 X-RAY EXAM OF HAND: CPT | Mod: TC,50,PO

## 2023-10-30 PROCEDURE — 99204 OFFICE O/P NEW MOD 45 MIN: CPT | Mod: 25,S$GLB,, | Performed by: ORTHOPAEDIC SURGERY

## 2023-10-30 PROCEDURE — 1160F PR REVIEW ALL MEDS BY PRESCRIBER/CLIN PHARMACIST DOCUMENTED: ICD-10-PCS | Mod: CPTII,S$GLB,, | Performed by: ORTHOPAEDIC SURGERY

## 2023-10-30 PROCEDURE — 1159F PR MEDICATION LIST DOCUMENTED IN MEDICAL RECORD: ICD-10-PCS | Mod: CPTII,S$GLB,, | Performed by: ORTHOPAEDIC SURGERY

## 2023-10-30 PROCEDURE — 20600 DRAIN/INJ JOINT/BURSA W/O US: CPT | Mod: RT,S$GLB,, | Performed by: ORTHOPAEDIC SURGERY

## 2023-10-30 PROCEDURE — 3044F PR MOST RECENT HEMOGLOBIN A1C LEVEL <7.0%: ICD-10-PCS | Mod: CPTII,S$GLB,, | Performed by: ORTHOPAEDIC SURGERY

## 2023-10-30 PROCEDURE — 99999 PR PBB SHADOW E&M-EST. PATIENT-LVL III: CPT | Mod: PBBFAC,,, | Performed by: ORTHOPAEDIC SURGERY

## 2023-10-30 PROCEDURE — 99999 PR PBB SHADOW E&M-EST. PATIENT-LVL III: ICD-10-PCS | Mod: PBBFAC,,, | Performed by: ORTHOPAEDIC SURGERY

## 2023-10-30 PROCEDURE — 73130 X-RAY EXAM OF HAND: CPT | Mod: 26,LT,, | Performed by: RADIOLOGY

## 2023-10-30 PROCEDURE — 3044F HG A1C LEVEL LT 7.0%: CPT | Mod: CPTII,S$GLB,, | Performed by: ORTHOPAEDIC SURGERY

## 2023-10-30 PROCEDURE — 1159F MED LIST DOCD IN RCRD: CPT | Mod: CPTII,S$GLB,, | Performed by: ORTHOPAEDIC SURGERY

## 2023-10-30 PROCEDURE — 73130 X-RAY EXAM OF HAND: CPT | Mod: 26,RT,, | Performed by: RADIOLOGY

## 2023-10-30 PROCEDURE — 20600 SMALL JOINT ASPIRATION/INJECTION: R THUMB CMC: ICD-10-PCS | Mod: RT,S$GLB,, | Performed by: ORTHOPAEDIC SURGERY

## 2023-10-30 RX ADMIN — METHYLPREDNISOLONE ACETATE 40 MG: 40 INJECTION, SUSPENSION INTRA-ARTICULAR; INTRALESIONAL; INTRAMUSCULAR; SOFT TISSUE at 02:10

## 2023-10-30 NOTE — PROGRESS NOTES
[facility]  History & Physical  Orthopedics    SUBJECTIVE:      COVID-19 attestation:  This patient was treated during the COVID-19 pandemic.  This was discussed with the patient, they are aware of our current policies and procedures, were given the option of delaying their visit and or switching to a virtual visit, delaying their surgery when applicable, and they elect to proceed.    Chief Complaint:   Chief Complaint   Patient presents with    Right Hand - Pain, Numbness       Referring Provider: None    History of Present Illness:  Patient is a 43 y.o. right hand dominant female who presents today with complaints of right basilar thumb pain.     The patient is a/an  at a construction company.    Pain in right Thumb base has been ongoing since 2 months. It has worsened and is causing pain, swelling, and stiffness. Pain is worse with activity, gripping, writing, work, and painting. Patient has tried OTC analgesics, avoidance of offending activity. Inciting Event: no known event. She states Ultram helps.    Of note patient did recall a fall in 2022 where she tripped over the entryway of her house carrying a large heavy piece of glass. She fell on her side and mostly affected her ankle.  She also reports having a bad neck.       The patient denies any fevers, chills, N/V, D/C and presents for evaluation.       Past Medical History:   Diagnosis Date    Arthritis     hip    Asthma     childhood    Bipolar 1 disorder     Clotting disorder 2006    Factor 5    GERD (gastroesophageal reflux disease)     Low back pain due to displacement of intervertebral disc     Morbid obesity with BMI of 45.0-49.9, adult     Thyroid nodule 05/29/2017     Past Surgical History:   Procedure Laterality Date    ANTERIOR CERVICAL DISCECTOMY W/ FUSION  2016    APPENDECTOMY      ARTHROSCOPY OF KNEE Right     ESOPHAGOGASTRODUODENOSCOPY      LAPAROSCOPIC APPENDECTOMY N/A 02/04/2019    Procedure: APPENDECTOMY,  LAPAROSCOPIC;  Surgeon: Maikol Banks Jr., MD;  Location: [facility] OR;  Service: General;  Laterality: N/A;    Laparoscopic hh with Toupet      NASAL SEPTOPLASTY N/A 09/25/2020    Procedure: SEPTOPLASTY, NOSE;  Surgeon: Mike Alexander MD;  Location: [facility] OR 2ND FLR;  Service: ENT;  Laterality: N/A;    NASAL TURBINATE REDUCTION Bilateral 09/25/2020    Procedure: REDUCTION, NASAL TURBINATE;  Surgeon: Mike Alexander MD;  Location: [facility] OR 2ND FLR;  Service: ENT;  Laterality: Bilateral;    TONSILLECTOMY  2007    TUBAL LIGATION      UPPER GASTROINTESTINAL ENDOSCOPY      uterine ablation       Review of patient's allergies indicates:   Allergen Reactions    Percocet [oxycodone-acetaminophen] Nausea And Vomiting    Sulfa (sulfonamide antibiotics) Rash    Sulfadiazine     Phentermine Rash     Social History     Social History Narrative    16 y/o daughter at [employer], senior        Works for human resources     Family History   Problem Relation Age of Onset    Hypertension Father     Diabetes Paternal Grandfather     Asthma Maternal Uncle     Asthma Cousin     Cancer Neg Hx     Heart disease Neg Hx     Stroke Neg Hx          Current Outpatient Medications:     acetaZOLAMIDE (DIAMOX) 250 MG tablet, Take 1 tablet (250 mg total) by mouth once daily., Disp: 90 tablet, Rfl: 3    buPROPion (WELLBUTRIN SR) 200 MG SR12, Take 1 tablet (200 mg total) by mouth once daily., Disp: 90 tablet, Rfl: 0    butalbital-acetaminophen-caffeine -40 mg (FIORICET, ESGIC) -40 mg per tablet, Take 1 tablet by mouth every 8 (eight) hours as needed for Pain. DO NOT EXCEED 3 TABS PER WEEK, Disp: 12 tablet, Rfl: 2    cholecalciferol, vitamin D3, (VITAMIN D3) 50 mcg (2,000 unit) Tab, Take 1 tablet (2,000 Units total) by mouth once daily., Disp: 90 each, Rfl: 3    lamoTRIgine (LAMICTAL) 150 MG Tab, Take 1 tablet (150 mg total) by mouth once daily., Disp: 90 tablet, Rfl: 0    LORazepam (ATIVAN) 0.5 MG tablet, Take 0.5 mg by mouth  daily as needed., Disp: , Rfl:     ondansetron (ZOFRAN) 4 MG tablet, Take 2 tablets (8 mg total) by mouth every 8 (eight) hours as needed for Nausea., Disp: 30 tablet, Rfl: 0    rizatriptan (MAXALT) 10 MG tablet, Take 1 tablet (10 mg total) by mouth as needed for Migraine., Disp: 12 tablet, Rfl: 3    traMADoL (ULTRAM) 50 mg tablet, Take 1 tablet (50 mg total) by mouth every 8 (eight) hours as needed for Pain., Disp: 90 tablet, Rfl: 3    TRINTELLIX 10 mg Tab, Take 1 tablet (10 mg total) by mouth once daily., Disp: 90 tablet, Rfl: 0    ROS    Review of Systems:  Constitutional: no fever or chills  Eyes: no visual changes  ENT: no nasal congestion or sore throat  Respiratory: no cough or shortness of breath  Cardiovascular: no chest pain  Gastrointestinal: no nausea or vomiting, tolerating diet  Musculoskeletal: pain, soreness, and decreased ROM    OBJECTIVE:      Vital Signs (Most Recent):  There were no vitals filed for this visit.  There is no height or weight on file to calculate BMI.    Physical Exam    Physical Exam:  Constitutional: The patient appears well-developed and well-nourished. No distress.   Head: Normocephalic and atraumatic.   Nose: Nose normal.   Eyes: Conjunctivae and EOM are normal.   Neck: No tracheal deviation present.   Cardiovascular: Normal rate and intact distal pulses.    Pulmonary/Chest: Effort normal. No respiratory distress.   Abdominal: There is no guarding.   Lymphatic: Negative for adenopathy   Neurological: The patient is alert.   Psychiatric: The patient has a normal mood and affect.     Bilateral Hand/Wrist Examination:    Observation/Inspection:  Swelling  none    Deformity  none  Discoloration  none     Scars   none    Atrophy  none    HAND/WRIST EXAMINATION:  Finkelstein's Test   Neg  WHAT Test    Neg  Snuff box tenderness   Neg  Mendes's Test    Neg  Hook of Hamate Tenderness  Neg  CMC grind    Positive right  Circumduction test   Positive right  TFCC Compression  Test  Neg    TTP right basilar thumb. ROM hand/wrist/elbow full    Neurovascular Exam:  Digits WWP, brisk CR < 3s throughout  NVI motor/LTS to M/R/U nerves, radial pulse 2+  2+ biceps and brachioradialis reflexes  Tinel's Test - Carpal Tunnel  Neg  Tinel's Test - Cubital Tunnel  Neg  Phalen's Test    Neg  Median Nerve Compression Test Neg    Diagnostic Results:    X-rays AP, lateral and oblique bilateral hands taken today are independently reviewed by me and show Eaton stage II basilar thumb arthritis.     ASSESSMENT/PLAN:      43 y.o. yo female with   Encounter Diagnoses   Name Primary?    Primary osteoarthritis of first carpometacarpal joint of right hand Yes    Thumb pain, right         Plan:  We have discussed the natural history of basilar thumb arthritis including treatment options such as splinting, oral and topical anti-inflammatories, cortisone injections and surgery. I have recommended a right thumb 3D brace for comfort.     -I have offered him a selective injection. I have explained the risks, benefits, and alternatives of the procedure in detail.  The patient voices understanding and all questions have been answered. The patient agrees to proceed as planned. So after a sterile prep of the skin in the normal fashion the right thumb CMC joint  were injected using a 25 gauge needle with a combination of 1cc 1% plain lidocaine and 40 mg of methylprednisolone.  The patient is cautioned and immediate relief of pain is secondary to the local anesthetic and will be temporary.  After the anesthetic wears off there may be a increase in pain that may last for a few hours or a few days and they should use ice to help alleviate this flair up of pain. Patient tolerated the procedure well.     F/U as needed for any worsening of symptoms  Call with any questions/concerns in the interim      The patient's pathophysiology was explained in detail with reference to x-rays, models, other visual aids as appropriate.   Treatment options were discussed in detail.  Questions were invited and answered to the patient's satisfaction. I reviewed Primary care , and other specialty's notes to better coordinate patient's care.             Koki Melchor MD     Please be aware that this note has been generated with the assistance of MModal voice-to-text.  Please excuse any spelling or grammatical errors.

## 2023-12-01 RX ORDER — METHYLPREDNISOLONE ACETATE 40 MG/ML
40 INJECTION, SUSPENSION INTRA-ARTICULAR; INTRALESIONAL; INTRAMUSCULAR; SOFT TISSUE
Status: DISCONTINUED | OUTPATIENT
Start: 2023-10-30 | End: 2023-12-01 | Stop reason: HOSPADM

## 2023-12-01 NOTE — PROCEDURES
Small Joint Aspiration/Injection: R thumb CMC    Date/Time: 10/30/2023 2:15 PM    Performed by: Koki Melchor MD  Authorized by: Koki Melchor MD    Consent Done?:  Yes (Verbal)  Indications:  Pain  Timeout: prior to procedure the correct patient, procedure, and site was verified    Prep: patient was prepped and draped in usual sterile fashion      Local anesthesia used?: Yes    Anesthesia:  Local infiltration  Local anesthetic:  Lidocaine 1% without epinephrine  Location:  Thumb  Site:  R thumb CMC  Needle size:  25 G  Medications:  40 mg methylPREDNISolone acetate 40 mg/mL  Patient tolerance:  Patient tolerated the procedure well with no immediate complications

## 2024-02-01 ENCOUNTER — PATIENT MESSAGE (OUTPATIENT)
Dept: PRIMARY CARE CLINIC | Facility: CLINIC | Age: 44
End: 2024-02-01
Payer: COMMERCIAL

## 2024-02-09 DIAGNOSIS — G43.009 MIGRAINE WITHOUT AURA AND WITHOUT STATUS MIGRAINOSUS, NOT INTRACTABLE: ICD-10-CM

## 2024-02-09 RX ORDER — ACETAZOLAMIDE 250 MG/1
250 TABLET ORAL DAILY
Qty: 90 TABLET | Refills: 3 | Status: SHIPPED | OUTPATIENT
Start: 2024-02-09 | End: 2024-02-21 | Stop reason: SDUPTHER

## 2024-02-21 ENCOUNTER — OFFICE VISIT (OUTPATIENT)
Dept: NEUROLOGY | Facility: CLINIC | Age: 44
End: 2024-02-21
Payer: COMMERCIAL

## 2024-02-21 DIAGNOSIS — G43.009 MIGRAINE WITHOUT AURA AND WITHOUT STATUS MIGRAINOSUS, NOT INTRACTABLE: ICD-10-CM

## 2024-02-21 DIAGNOSIS — E66.01 CLASS 3 OBESITY: Primary | ICD-10-CM

## 2024-02-21 PROBLEM — E66.813 CLASS 3 OBESITY: Status: ACTIVE | Noted: 2024-02-21

## 2024-02-21 PROCEDURE — 99213 OFFICE O/P EST LOW 20 MIN: CPT | Mod: 95,,, | Performed by: PSYCHIATRY & NEUROLOGY

## 2024-02-21 RX ORDER — ACETAZOLAMIDE 250 MG/1
250 TABLET ORAL DAILY
Qty: 90 TABLET | Refills: 3 | Status: SHIPPED | OUTPATIENT
Start: 2024-02-21 | End: 2024-05-06

## 2024-02-21 RX ORDER — RIZATRIPTAN BENZOATE 10 MG/1
10 TABLET ORAL
Qty: 12 TABLET | Refills: 3 | Status: SHIPPED | OUTPATIENT
Start: 2024-02-21 | End: 2024-06-07

## 2024-02-21 NOTE — PROGRESS NOTES
[facility] - NEUROLOGY [address]TH [address] [facility], [place]    Date: 2/21/24  Patient Name: Sarah Isabel   MRN: [id number]   PCP: Nini Pryor  Referring Provider: No ref. provider found    Assessment:   Sarah Isabel is a 43 y.o. female presenting in follow-up for management of IIH and migraine.  Continuing Diamox and Maxalt with no changes.  Plan:     Problem List Items Addressed This Visit          Neuro    Migraine without aura and without status migrainosus, not intractable    Relevant Medications    rizatriptan (MAXALT) 10 MG tablet    acetaZOLAMIDE (DIAMOX) 250 MG tablet       Endocrine    Class 3 obesity - Primary       Harris Espinosa MD  [facility]   Department of Neurology    Patient note was created using MModal Dictation.  Any errors in syntax or even information may not have been identified and edited on initial review prior to signing this note.  Subjective:     HPI:   Ms. Sarah Isabel is a 43 y.o. female With history of IIH and migraine.  Patient reports she is currently doing well with only occasional breakthrough headaches approximately once per month.  She states they rapidly respond Maxalt.  She denies any side effects with her Diamox.  She has no other complaints today.    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     Morbid obesity with BMI of 45.0-49.9, adult     Thyroid nodule 05/29/2017       PAST SURGICAL HISTORY:  Past Surgical History:   Procedure Laterality Date    ANTERIOR CERVICAL DISCECTOMY W/ FUSION  2016    APPENDECTOMY      ARTHROSCOPY OF KNEE Right     ESOPHAGOGASTRODUODENOSCOPY      LAPAROSCOPIC APPENDECTOMY N/A 02/04/2019    Procedure:  APPENDECTOMY, LAPAROSCOPIC;  Surgeon: Maikol Banks Jr., MD;  Location: [facility] OR;  Service: General;  Laterality: N/A;    Laparoscopic hh with Toupet      NASAL SEPTOPLASTY N/A 09/25/2020    Procedure: SEPTOPLASTY, NOSE;  Surgeon: Mike Alexander MD;  Location: [facility] OR 2ND FLR;  Service: ENT;  Laterality: N/A;    NASAL TURBINATE REDUCTION Bilateral 09/25/2020    Procedure: REDUCTION, NASAL TURBINATE;  Surgeon: Mike Alexander MD;  Location: [facility] OR 2ND FLR;  Service: ENT;  Laterality: Bilateral;    TONSILLECTOMY  2007    TUBAL LIGATION      UPPER GASTROINTESTINAL ENDOSCOPY      uterine ablation         CURRENT MEDS:  Current Outpatient Medications   Medication Sig Dispense Refill    acetaZOLAMIDE (DIAMOX) 250 MG tablet Take 1 tablet (250 mg total) by mouth once daily. 90 tablet 3    buPROPion (WELLBUTRIN SR) 200 MG SR12 Take 1 tablet (200 mg total) by mouth once daily. 90 tablet 0    cholecalciferol, vitamin D3, (VITAMIN D3) 50 mcg (2,000 unit) Tab Take 1 tablet (2,000 Units total) by mouth once daily. 90 each 3    lamoTRIgine (LAMICTAL) 150 MG Tab Take 1 tablet (150 mg total) by mouth once daily. 90 tablet 0    LORazepam (ATIVAN) 0.5 MG tablet Take 0.5 mg by mouth daily as needed.      rizatriptan (MAXALT) 10 MG tablet Take 1 tablet (10 mg total) by mouth as needed for Migraine. 12 tablet 3    traMADoL (ULTRAM) 50 mg tablet Take 1 tablet (50 mg total) by mouth every 8 (eight) hours as needed for Pain. 90 tablet 3    TRINTELLIX 10 mg Tab Take 1 tablet (10 mg total) by mouth once daily. 90 tablet 0     No current facility-administered medications for this visit.       ALLERGIES:  Review of patient's allergies indicates:   Allergen Reactions    Percocet [oxycodone-acetaminophen] Nausea And Vomiting    Sulfa (sulfonamide antibiotics) Rash    Sulfadiazine     Phentermine Rash       FAMILY HISTORY:  Family History   Problem Relation Age of Onset    Hypertension Father     Diabetes Paternal Grandfather      Asthma Maternal Uncle     Asthma Cousin     Cancer Neg Hx     Heart disease Neg Hx     Stroke Neg Hx        SOCIAL HISTORY:  Social History     Tobacco Use    Smoking status: Former     Current packs/day: 0.00     Average packs/day: 0.3 packs/day for 10.0 years (2.5 ttl pk-yrs)     Types: Cigarettes     Start date: 2002     Quit date: 2012     Years since quittin.0     Passive exposure: Never    Smokeless tobacco: Never    Tobacco comments:     quit smoking    Substance Use Topics    Alcohol use: Not Currently    Drug use: No       Review of Systems:  12 system review of systems is negative except for the symptoms mentioned in HPI.      Objective:     General: NAD, well nourished   Eyes: no tearing, discharge, no erythema   ENT: moist mucous membranes of the oral cavity, nares patent    Neck: Supple, full range of motion  Cardiovascular: Appears well perfused  Lungs: Normal work of breathing, normal chest wall excursions  Skin: No rash, lesions, or breakdown on exposed skin  Psychiatry: Mood and affect are appropriate   Abdomen: nondistended, non tender  Extremeties: No cyanosis, clubbing or edema visible    Neurological   MENTAL STATUS: Alert and oriented to person, place, and time. Attention and concentration within normal limits. Speech without dysarthria, able to name and repeat without difficulty. Recent and remote memory within normal limits   CRANIAL NERVES: Visual fields intact. PERRL. EOMI. Facial sensation intact. Face symmetrical. Hearing grossly intact. Full shoulder shrug bilaterally. Tongue protrudes midline   SENSORY: Sensation is intact to light touch throughout.   MOTOR: Normal bulk No pronator drift.  Moves all extremities symmetrically.  CEREBELLAR/COORDINATION/GAIT: Gait steady.

## 2024-03-07 ENCOUNTER — PATIENT MESSAGE (OUTPATIENT)
Dept: PRIMARY CARE CLINIC | Facility: CLINIC | Age: 44
End: 2024-03-07
Payer: COMMERCIAL

## 2024-03-07 DIAGNOSIS — K76.0 FATTY LIVER: Primary | ICD-10-CM

## 2024-04-15 ENCOUNTER — OFFICE VISIT (OUTPATIENT)
Dept: OBSTETRICS AND GYNECOLOGY | Facility: CLINIC | Age: 44
End: 2024-04-15
Payer: COMMERCIAL

## 2024-04-15 VITALS
DIASTOLIC BLOOD PRESSURE: 96 MMHG | BODY MASS INDEX: 41.86 KG/M2 | WEIGHT: 221.56 LBS | SYSTOLIC BLOOD PRESSURE: 140 MMHG

## 2024-04-15 DIAGNOSIS — Z01.419 ENCOUNTER FOR GYNECOLOGICAL EXAMINATION WITHOUT ABNORMAL FINDING: Primary | ICD-10-CM

## 2024-04-15 DIAGNOSIS — Z12.31 BREAST CANCER SCREENING BY MAMMOGRAM: ICD-10-CM

## 2024-04-15 DIAGNOSIS — N94.6 DYSMENORRHEA: ICD-10-CM

## 2024-04-15 PROCEDURE — 3077F SYST BP >= 140 MM HG: CPT | Mod: CPTII,S$GLB,, | Performed by: OBSTETRICS & GYNECOLOGY

## 2024-04-15 PROCEDURE — 3080F DIAST BP >= 90 MM HG: CPT | Mod: CPTII,S$GLB,, | Performed by: OBSTETRICS & GYNECOLOGY

## 2024-04-15 PROCEDURE — 99999 PR PBB SHADOW E&M-EST. PATIENT-LVL III: CPT | Mod: PBBFAC,,, | Performed by: OBSTETRICS & GYNECOLOGY

## 2024-04-15 PROCEDURE — 1159F MED LIST DOCD IN RCRD: CPT | Mod: CPTII,S$GLB,, | Performed by: OBSTETRICS & GYNECOLOGY

## 2024-04-15 PROCEDURE — 3008F BODY MASS INDEX DOCD: CPT | Mod: CPTII,S$GLB,, | Performed by: OBSTETRICS & GYNECOLOGY

## 2024-04-15 PROCEDURE — 99386 PREV VISIT NEW AGE 40-64: CPT | Mod: S$GLB,,, | Performed by: OBSTETRICS & GYNECOLOGY

## 2024-04-15 PROCEDURE — 88175 CYTOPATH C/V AUTO FLUID REDO: CPT | Performed by: OBSTETRICS & GYNECOLOGY

## 2024-04-15 PROCEDURE — 87624 HPV HI-RISK TYP POOLED RSLT: CPT | Performed by: OBSTETRICS & GYNECOLOGY

## 2024-04-15 NOTE — PROGRESS NOTES
Subjective     Patient ID: Sarah Isabel is a 43 y.o. female.    Chief Complaint:  Well Woman      History of Present Illness  HPI  Annual Exam-Premenopausal  Patient presents for annual exam. The patient has no complaints today. The patient is sexually active. GYN screening history: last pap: was normal. The patient wears seatbelts: yes. The patient participates in regular exercise: yes. Has the patient ever been transfused or tattooed?: yes. The patient reports that there is not domestic violence in her life.    Had endometrial ablation and no longer has menses.  However, she does report having severe dysmenorrhea when her menses should come and has sharp pelvic pain at other times as well.    GYN & OB History  No LMP recorded. Patient has had an ablation.   Date of Last Pap: 4/15/2024    OB History    Para Term  AB Living   1 1 1     1   SAB IAB Ectopic Multiple Live Births           1      # Outcome Date GA Lbr Harish/2nd Weight Sex Type Anes PTL Lv   1 Term      Vag-Spont        Past Medical History:  Past Medical History:   Diagnosis Date    Arthritis     hip    Asthma     childhood    Bipolar 1 disorder     Clotting disorder 2006    Factor 5    GERD (gastroesophageal reflux disease)     Low back pain due to displacement of intervertebral disc     Morbid obesity with BMI of 45.0-49.9, adult     Thyroid nodule 2017       Past Surgical History:  Past Surgical History:   Procedure Laterality Date    ANTERIOR CERVICAL DISCECTOMY W/ FUSION  2016    APPENDECTOMY      ARTHROSCOPY OF KNEE Right     ESOPHAGOGASTRODUODENOSCOPY      LAPAROSCOPIC APPENDECTOMY N/A 2019    Procedure: APPENDECTOMY, LAPAROSCOPIC;  Surgeon: Maikol Banks Jr., MD;  Location: [facility] OR;  Service: General;  Laterality: N/A;    Laparoscopic hh with Toupet      NASAL SEPTOPLASTY N/A 2020    Procedure: SEPTOPLASTY, NOSE;  Surgeon: Mike Alexander MD;  Location: [facility] OR [address] [place] [address];  Service: ENT;  Laterality: N/A;     NASAL TURBINATE REDUCTION Bilateral 09/25/2020    Procedure: REDUCTION, NASAL TURBINATE;  Surgeon: Mike Alexander MD;  Location: [facility] OR [address] [place] [address];  Service: ENT;  Laterality: Bilateral;    TONSILLECTOMY  2007    TUBAL LIGATION      UPPER GASTROINTESTINAL ENDOSCOPY      uterine ablation         Family History:  Family History   Problem Relation Name Age of Onset    Hypertension Father      Diabetes Paternal Grandfather      Asthma Maternal Uncle      Asthma Cousin      Cancer Neg Hx      Heart disease Neg Hx      Stroke Neg Hx         Allergies:  Review of patient's allergies indicates:   Allergen Reactions    Percocet [oxycodone-acetaminophen] Nausea And Vomiting    Sulfa (sulfonamide antibiotics) Rash    Sulfadiazine     Phentermine Rash       Medications:  Current Outpatient Medications on File Prior to Visit   Medication Sig Dispense Refill    acetaZOLAMIDE (DIAMOX) 250 MG tablet Take 1 tablet (250 mg total) by mouth once daily. 90 tablet 3    buPROPion (WELLBUTRIN SR) 200 MG SR12 Take 1 tablet (200 mg total) by mouth once daily. 90 tablet 0    cholecalciferol, vitamin D3, (VITAMIN D3) 50 mcg (2,000 unit) Tab Take 1 tablet (2,000 Units total) by mouth once daily. 90 each 3    lamoTRIgine (LAMICTAL) 150 MG Tab Take 1 tablet (150 mg total) by mouth once daily. 90 tablet 0    LORazepam (ATIVAN) 0.5 MG tablet Take 0.5 mg by mouth daily as needed.      traMADoL (ULTRAM) 50 mg tablet Take 1 tablet (50 mg total) by mouth every 8 (eight) hours as needed for Pain. 90 tablet 3    TRINTELLIX 10 mg Tab Take 1 tablet (10 mg total) by mouth once daily. 90 tablet 0    rizatriptan (MAXALT) 10 MG tablet Take 1 tablet (10 mg total) by mouth as needed for Migraine. 12 tablet 3     No current facility-administered medications on file prior to visit.       Social History:  Social History     Tobacco Use    Smoking status: Former     Current packs/day: 0.00     Average packs/day: 0.3 packs/day for 10.0 years (2.5 ttl pk-yrs)      Types: Cigarettes     Start date: 2002     Quit date: 2012     Years since quittin.1     Passive exposure: Never    Smokeless tobacco: Never    Tobacco comments:     quit smoking    Substance Use Topics    Alcohol use: Not Currently    Drug use: No            Review of Systems  Review of Systems   Constitutional: Negative.    HENT: Negative.     Eyes: Negative.    Respiratory: Negative.     Cardiovascular: Negative.    Gastrointestinal: Negative.    Endocrine: Negative.    Genitourinary:  Positive for dysmenorrhea and pelvic pain.   Musculoskeletal: Negative.    Integumentary:  Negative.   Neurological: Negative.    Hematological: Negative.    Psychiatric/Behavioral: Negative.     Breast: negative.           Objective   Physical Exam:   Constitutional: She is oriented to person, place, and time. She appears well-nourished.    HENT:   Head: Normocephalic and atraumatic.    Eyes: EOM are normal. Right eye exhibits normal extraocular motion. Left eye exhibits normal extraocular motion.    Neck: No thyromegaly present.    Cardiovascular:  Normal rate.             Pulmonary/Chest: Effort normal. No respiratory distress. Right breast exhibits no mass, no skin change and no tenderness. Left breast exhibits no mass, no skin change and no tenderness. Breasts are symmetrical.        Abdominal: Soft. She exhibits no distension and no mass. There is no abdominal tenderness.     Genitourinary:    Vagina, uterus, right adnexa and left adnexa normal.      Pelvic exam was performed with patient supine.   The external female genitalia was normal.   No external genitalia lesions identified,Genitalia hair distrobution normal .     Labial bartholins normal.There is no rash or lesion on the right labia. There is no rash or lesion on the left labia. Cervix is normal. Right adnexum displays no tenderness and no fullness. Left adnexum displays no tenderness and no fullness. No vaginal discharge or bleeding in the vagina.     No signs of injury in the vagina.   Vagina was moist.Cervix exhibits no motion tenderness and no friability. Uterus consistancy normal and Uerus contour normal  Uterus is not tender. Normal urethral meatus.Urethral Meatus exhibits: urethral lesionUrethra findings: no urethral mass, no tenderness and prolapsedBladder findings: no bladder distention and no bladder tenderness          Musculoskeletal: Normal range of motion.      Lymphadenopathy:     She has no cervical adenopathy.    Neurological: She is oriented to person, place, and time.   Cranial Nerves II-XII grossly intact.    Skin: No rash noted. No erythema.    Psychiatric: She has a normal mood and affect. Her behavior is normal.       Results for orders placed during the hospital encounter of 08/17/23    US Pelvis Comp with Transvag NON-OB (xpd)    Narrative  EXAMINATION:  US PELVIS COMP WITH TRANSVAG NON-OB (XPD)    CLINICAL HISTORY:  Pelvic and perineal pain    TECHNIQUE:  Transabdominal sonography of the pelvis was performed, followed by transvaginal sonography to better evaluate the uterus and ovaries.    COMPARISON:  CT abdomen pelvis with contrast 02/04/2019    FINDINGS:  Uterus:    Size: 6.8 x 2.9 x 3.3 cm    Masses: Heterogenous lesion in the anterior uterine wall measuring 1.1 x 0.9 x 1.3 cm, likely representing an intramural fibroid.    Endometrium: Normal, measuring 3 mm.    Cervix:    Small anterior nabothian cyst.    Right ovary:    Size: 2.5 x 1.8 x 2.9 cm    Appearance: Normal appearing with simple an minimally complex physiologic follicles.    Vascular flow: Normal.    Left ovary:    Size: 2.2 x 1.0 x 2.3 cm    Appearance: No significant abnormalities.    Vascular Flow: Normal.    Free Fluid:    None.    Impression  Uterine fibroid.    No acute sonographic abnormalities.    Electronically signed by resident: Marty Banks  Date:    08/17/2023  Time:    13:52    Electronically signed by: Reno Banks  MD  Date:    08/17/2023  Time:    14:03              Assessment and Plan     1. Encounter for gynecological examination without abnormal finding    2. Breast cancer screening by mammogram    3. Dysmenorrhea             Plan:  1. Encounter for gynecological examination without abnormal finding  - Pap and HPV done today.  -   Screening tests as ordered.  - Diet and exercise encouraged.    Counseling: injury prevention: Driving under the influence of alcohol  Seatbelts  Stress management techniques  indications for and frequency of periodic gynecologic exam  reviewed current Pap guidelines. Explained new understanding of natural history of cervical disease and improved Paps. Recommended guideline concordant care.  - Liquid-Based Pap Smear, Screening  - HPV High Risk Genotypes, PCR    2. Breast cancer screening by mammogram  - Self breast exams encouraged  - Doing mammogram today.    3. Dysmenorrhea  - Reviewed with patient that the only treatment after an endometrial ablation is hysterectomy.  - reviewed [facility] procedure and recovery.  - she is undecided.  She will notify office if she would like to proceed.

## 2024-04-22 ENCOUNTER — PATIENT MESSAGE (OUTPATIENT)
Dept: OBSTETRICS AND GYNECOLOGY | Facility: CLINIC | Age: 44
End: 2024-04-22
Payer: COMMERCIAL

## 2024-04-22 ENCOUNTER — LAB VISIT (OUTPATIENT)
Dept: LAB | Facility: HOSPITAL | Age: 44
End: 2024-04-22
Attending: FAMILY MEDICINE
Payer: COMMERCIAL

## 2024-04-22 ENCOUNTER — OFFICE VISIT (OUTPATIENT)
Dept: PRIMARY CARE CLINIC | Facility: CLINIC | Age: 44
End: 2024-04-22
Payer: COMMERCIAL

## 2024-04-22 VITALS
OXYGEN SATURATION: 98 % | WEIGHT: 220 LBS | DIASTOLIC BLOOD PRESSURE: 73 MMHG | SYSTOLIC BLOOD PRESSURE: 118 MMHG | HEIGHT: 61 IN | HEART RATE: 93 BPM | BODY MASS INDEX: 41.54 KG/M2

## 2024-04-22 DIAGNOSIS — F31.9 BIPOLAR 1 DISORDER: ICD-10-CM

## 2024-04-22 DIAGNOSIS — G93.2 PSEUDOTUMOR CEREBRI SYNDROME: ICD-10-CM

## 2024-04-22 DIAGNOSIS — K76.0 HEPATIC STEATOSIS: ICD-10-CM

## 2024-04-22 DIAGNOSIS — Z23 NEED FOR PNEUMOCOCCAL 20-VALENT CONJUGATE VACCINATION: ICD-10-CM

## 2024-04-22 DIAGNOSIS — R73.03 PREDIABETES: ICD-10-CM

## 2024-04-22 DIAGNOSIS — R73.03 PREDIABETES: Primary | ICD-10-CM

## 2024-04-22 DIAGNOSIS — Z13.820 ENCOUNTER FOR SCREENING FOR OSTEOPOROSIS: ICD-10-CM

## 2024-04-22 LAB
ALBUMIN SERPL BCP-MCNC: 3.9 G/DL (ref 3.5–5.2)
ALP SERPL-CCNC: 84 U/L (ref 55–135)
ALT SERPL W/O P-5'-P-CCNC: 18 U/L (ref 10–44)
ANION GAP SERPL CALC-SCNC: 10 MMOL/L (ref 8–16)
AST SERPL-CCNC: 16 U/L (ref 10–40)
BILIRUB SERPL-MCNC: 0.2 MG/DL (ref 0.1–1)
BUN SERPL-MCNC: 11 MG/DL (ref 6–20)
CALCIUM SERPL-MCNC: 9.3 MG/DL (ref 8.7–10.5)
CHLORIDE SERPL-SCNC: 108 MMOL/L (ref 95–110)
CO2 SERPL-SCNC: 20 MMOL/L (ref 23–29)
CREAT SERPL-MCNC: 1.1 MG/DL (ref 0.5–1.4)
EST. GFR  (NO RACE VARIABLE): >60 ML/MIN/1.73 M^2
ESTIMATED AVG GLUCOSE: 120 MG/DL (ref 68–131)
GLUCOSE SERPL-MCNC: 111 MG/DL (ref 70–110)
HBA1C MFR BLD: 5.8 % (ref 4–5.6)
POTASSIUM SERPL-SCNC: 4.1 MMOL/L (ref 3.5–5.1)
PROT SERPL-MCNC: 7.4 G/DL (ref 6–8.4)
SODIUM SERPL-SCNC: 138 MMOL/L (ref 136–145)

## 2024-04-22 PROCEDURE — 99214 OFFICE O/P EST MOD 30 MIN: CPT | Mod: S$GLB,,, | Performed by: FAMILY MEDICINE

## 2024-04-22 PROCEDURE — 3074F SYST BP LT 130 MM HG: CPT | Mod: CPTII,S$GLB,, | Performed by: FAMILY MEDICINE

## 2024-04-22 PROCEDURE — 83036 HEMOGLOBIN GLYCOSYLATED A1C: CPT | Performed by: FAMILY MEDICINE

## 2024-04-22 PROCEDURE — 99999 PR PBB SHADOW E&M-EST. PATIENT-LVL IV: CPT | Mod: PBBFAC,,, | Performed by: FAMILY MEDICINE

## 2024-04-22 PROCEDURE — 3008F BODY MASS INDEX DOCD: CPT | Mod: CPTII,S$GLB,, | Performed by: FAMILY MEDICINE

## 2024-04-22 PROCEDURE — 36415 COLL VENOUS BLD VENIPUNCTURE: CPT | Mod: PN | Performed by: FAMILY MEDICINE

## 2024-04-22 PROCEDURE — 3078F DIAST BP <80 MM HG: CPT | Mod: CPTII,S$GLB,, | Performed by: FAMILY MEDICINE

## 2024-04-22 PROCEDURE — 80053 COMPREHEN METABOLIC PANEL: CPT | Performed by: FAMILY MEDICINE

## 2024-04-22 PROCEDURE — 1159F MED LIST DOCD IN RCRD: CPT | Mod: CPTII,S$GLB,, | Performed by: FAMILY MEDICINE

## 2024-04-22 NOTE — PROGRESS NOTES
Subjective:       Patient ID: Sarah Isabel is a 43 y.o. female.    Chief Complaint: Follow-up and Pre-diabetes    HPI  42 y/o female former smoker (quit 2012) with migraines, hx of GERD s/p fundolipation in 3/2017, hx of thyroid nodules, Factor V Leiden def (dx in 2006), CLBP, Bipolar 1, Pseudotumor Cerebri and prediabetes is here for follow up.    She is feeling good in general, since last visit she was seen by GYN and was told a hysterectomy was recommended for L lower pelvic pain secondary to fibroids. She denies f/n/v, she has had heartburn more often lately, she is taking OTC omeprazole 20 mg about 3 days a week which helps, she denies f/n/v, she report diarrhea associated with what would be menses, occurs for 3 days then stopped, she denies cp/sob/urinary sx. She is trying to eat healthy. She is active and walks 3 days a week. She has R ankle pain off and on since 2013, was evaluated last year by podiatry but has not follow up.     R Thumb pain, she is following with ortho, she is s/p R CMC steroid injection 10/2023     Hx of Covid 2021 outpatient fully recovered  IBS-D: following with GI  Fatty liver/elevated ALT: due for visit with hepatology set for 7/2024  Prediabetes: A1c 5.7 8/2023  Hx of hiatal hernia/gastritis s/p lap fundolipation: omeprazole prn, using once every 2 weeks  Migraines/Pseudotumor Cerebri: following with neurology, Diamox and Maxalt  Vitamin D def: D3 2,000 IU daily  Hx of thyroid nodules:US 5/2017  Bipolar 1: followed by Dr. Mata, lamotrigine 150 mg bid, wellbutrin 200 mg bid, trintellix 10 mg daily, ativan prn (rare use as it makes her tired)  CLBP and Neck pain: following with Dr. Stern, tramadol 50 mg prn, did not tolerate flexeril secondary to fatigue  GYN: following with Dr. Catalan, pelvic utd, Uterine fibroids, mmg overdue  Eye exam utd [facility]  Dental utd    BP elevated today, has had frontotemporal headache x 3 days, resolves with Maxalt then returns the next day  "at 6 am, no vision changes, she is due for eye exam, some mild anusea      Review of Systems    Objective:      /73 (BP Location: Left arm, Patient Position: Sitting, BP Method: Medium (Manual))   Pulse 93   Ht 5' 1" (1.549 m)   Wt 99.8 kg (220 lb 0.3 oz)   SpO2 98%   BMI 41.57 kg/m²   Physical Exam  Vitals and nursing note reviewed.   Constitutional:       Appearance: She is well-developed.   HENT:      Head: Normocephalic and atraumatic.      Mouth/Throat:      Pharynx: No oropharyngeal exudate.   Neck:      Thyroid: No thyromegaly.   Cardiovascular:      Rate and Rhythm: Normal rate and regular rhythm.      Heart sounds: Normal heart sounds.   Pulmonary:      Effort: Pulmonary effort is normal. No respiratory distress.      Breath sounds: Normal breath sounds.   Musculoskeletal:      Cervical back: Normal range of motion and neck supple.      Right lower leg: No edema.      Left lower leg: No edema.   Lymphadenopathy:      Cervical: No cervical adenopathy.   Skin:     General: Skin is warm and dry.   Neurological:      Mental Status: She is alert.         Assessment:       1. Prediabetes    2. Bipolar 1 disorder    3. Hepatic steatosis    4. Pseudotumor cerebri syndrome    5. Need for pneumococcal 20-valent conjugate vaccination    6. Encounter for screening for osteoporosis        Plan:   Sarah was seen today for follow-up and pre-diabetes.    Diagnoses and all orders for this visit:    Prediabetes  -     Comprehensive Metabolic Panel; Future  -     Hemoglobin A1C; Future    Bipolar 1 disorder    Hepatic steatosis  -     Comprehensive Metabolic Panel; Future    Pseudotumor cerebri syndrome    Need for pneumococcal 20-valent conjugate vaccination  -     (In Office Administered) Pneumococcal Conjugate Vaccine (20 Valent) (IM) (Preferred)    Encounter for screening for osteoporosis  -     DXA Bone Density Axial Skeleton 1 or more sites; Future        "

## 2024-04-25 ENCOUNTER — TELEPHONE (OUTPATIENT)
Dept: ORTHOPEDICS | Facility: CLINIC | Age: 44
End: 2024-04-25
Payer: COMMERCIAL

## 2024-04-25 DIAGNOSIS — M79.641 BILATERAL HAND PAIN: Primary | ICD-10-CM

## 2024-04-25 DIAGNOSIS — M79.642 BILATERAL HAND PAIN: Primary | ICD-10-CM

## 2024-04-25 NOTE — TELEPHONE ENCOUNTER
Spoke to patient about moving appointment from 8:30 to 8am.     Patient communication     Notified patient to stop at [facility] [facility] - 1st Floor [address] [facility], Please park in [place] and use [place] elevators 45 minutes prior to your appointment time for X-ray. After your X-ray please proceed to [address]th Floor [address] for Appointment on 5.8.24 with Dr. Melchor for x-rays.     Made them aware that this is not a scheduled xray appointment and they might be running behind as they are considered a walk-in xray.    Verbalized the Following:  *Please arrive at your informed time above, if you are more than 15 Minutes late to your appointment with Dr. Melchor we will have to reschedule your appointment. This will allow you to be seen in a timely manor and be conscious to other patients being seen that same day*

## 2024-05-05 DIAGNOSIS — G43.009 MIGRAINE WITHOUT AURA AND WITHOUT STATUS MIGRAINOSUS, NOT INTRACTABLE: ICD-10-CM

## 2024-05-06 RX ORDER — ACETAZOLAMIDE 250 MG/1
250 TABLET ORAL
Qty: 90 TABLET | Refills: 3 | Status: SHIPPED | OUTPATIENT
Start: 2024-05-06

## 2024-05-07 ENCOUNTER — PATIENT MESSAGE (OUTPATIENT)
Dept: ORTHOPEDICS | Facility: CLINIC | Age: 44
End: 2024-05-07
Payer: COMMERCIAL

## 2024-05-08 ENCOUNTER — OFFICE VISIT (OUTPATIENT)
Dept: ORTHOPEDICS | Facility: CLINIC | Age: 44
End: 2024-05-08
Payer: COMMERCIAL

## 2024-05-08 ENCOUNTER — HOSPITAL ENCOUNTER (OUTPATIENT)
Dept: RADIOLOGY | Facility: OTHER | Age: 44
Discharge: HOME | End: 2024-05-08
Attending: ORTHOPAEDIC SURGERY
Payer: COMMERCIAL

## 2024-05-08 ENCOUNTER — TELEPHONE (OUTPATIENT)
Dept: PAIN MEDICINE | Facility: CLINIC | Age: 44
End: 2024-05-08
Payer: COMMERCIAL

## 2024-05-08 DIAGNOSIS — G56.21 CUBITAL TUNNEL SYNDROME ON RIGHT: ICD-10-CM

## 2024-05-08 DIAGNOSIS — M18.11 PRIMARY OSTEOARTHRITIS OF FIRST CARPOMETACARPAL JOINT OF RIGHT HAND: Primary | ICD-10-CM

## 2024-05-08 DIAGNOSIS — M54.12 CERVICAL RADICULOPATHY: ICD-10-CM

## 2024-05-08 DIAGNOSIS — M79.642 BILATERAL HAND PAIN: ICD-10-CM

## 2024-05-08 DIAGNOSIS — G56.01 CARPAL TUNNEL SYNDROME OF RIGHT WRIST: ICD-10-CM

## 2024-05-08 DIAGNOSIS — M79.641 BILATERAL HAND PAIN: ICD-10-CM

## 2024-05-08 PROCEDURE — 1159F MED LIST DOCD IN RCRD: CPT | Mod: CPTII,S$GLB,, | Performed by: ORTHOPAEDIC SURGERY

## 2024-05-08 PROCEDURE — 99999 PR PBB SHADOW E&M-EST. PATIENT-LVL III: CPT | Mod: PBBFAC,,, | Performed by: ORTHOPAEDIC SURGERY

## 2024-05-08 PROCEDURE — 20526 THER INJECTION CARP TUNNEL: CPT | Mod: RT,S$GLB,, | Performed by: ORTHOPAEDIC SURGERY

## 2024-05-08 PROCEDURE — 73130 X-RAY EXAM OF HAND: CPT | Mod: TC,50,FY

## 2024-05-08 PROCEDURE — 73130 X-RAY EXAM OF HAND: CPT | Mod: 26,RT,, | Performed by: RADIOLOGY

## 2024-05-08 PROCEDURE — 3044F HG A1C LEVEL LT 7.0%: CPT | Mod: CPTII,S$GLB,, | Performed by: ORTHOPAEDIC SURGERY

## 2024-05-08 PROCEDURE — 99214 OFFICE O/P EST MOD 30 MIN: CPT | Mod: 25,S$GLB,, | Performed by: ORTHOPAEDIC SURGERY

## 2024-05-08 PROCEDURE — 73130 X-RAY EXAM OF HAND: CPT | Mod: 26,LT,, | Performed by: RADIOLOGY

## 2024-05-08 PROCEDURE — 20600 DRAIN/INJ JOINT/BURSA W/O US: CPT | Mod: 51,XS,RT,S$GLB | Performed by: ORTHOPAEDIC SURGERY

## 2024-05-08 PROCEDURE — 1160F RVW MEDS BY RX/DR IN RCRD: CPT | Mod: CPTII,S$GLB,, | Performed by: ORTHOPAEDIC SURGERY

## 2024-05-08 RX ADMIN — METHYLPREDNISOLONE ACETATE 40 MG: 40 INJECTION, SUSPENSION INTRA-ARTICULAR; INTRALESIONAL; INTRAMUSCULAR; SOFT TISSUE at 03:05

## 2024-05-08 NOTE — PROCEDURES
Carpal Tunnel    Date/Time: 5/8/2024 3:15 PM    Performed by: Koki Melchor MD  Authorized by: Koki Melchor MD    Consent Done?:  Yes (Verbal)  Indications:  Pain  Timeout: prior to procedure the correct patient, procedure, and site was verified    Prep: patient was prepped and draped in usual sterile fashion      Local anesthesia used?: Yes    Anesthesia:  Local infiltration  Local anesthetic:  Lidocaine 1% without epinephrine  Location:  Wrist  Needle size:  25 G  Medications:  40 mg methylPREDNISolone acetate 40 mg/mL  Patient tolerance:  Patient tolerated the procedure well with no immediate complications  Small Joint Aspiration/Injection: R thumb CMC    Date/Time: 5/8/2024 3:15 PM    Performed by: Koki Melchor MD  Authorized by: Koki Melchor MD    Consent Done?:  Yes (Verbal)  Indications:  Pain  Timeout: prior to procedure the correct patient, procedure, and site was verified    Prep: patient was prepped and draped in usual sterile fashion      Local anesthesia used?: Yes    Anesthesia:  Local infiltration  Local anesthetic:  Lidocaine 1% without epinephrine  Location:  Thumb  Site:  R thumb CMC  Needle size:  25 G  Medications:  40 mg methylPREDNISolone acetate 40 mg/mL  Patient tolerance:  Patient tolerated the procedure well with no immediate complications

## 2024-05-08 NOTE — TELEPHONE ENCOUNTER
Staff left patient a message informing her that her appointment scheduled with  on 05/27/24 will need to be rescheduled and to reach back out to the office at [phone]   swing-through gait

## 2024-05-08 NOTE — PROGRESS NOTES
Sarah Isabel presents for follow up evaluation of   Encounter Diagnoses   Name Primary?    Primary osteoarthritis of first carpometacarpal joint of right hand Yes    Carpal tunnel syndrome of right wrist     Cubital tunnel syndrome on right     Cervical radiculopathy      History of Present Illness     Patient received right thumb cmc steroid injection that improved her symptoms up until February 2024. She is having difficulty with writing with a pen and gripping/grasping. She also notes that if she is painting her right hand and forearm goes numb that started around February 2024.     Patient has herniated cervical discs, and a cervical fusion C3-C5 that was performed in 2012  Vitals:    05/08/24 1546   PainSc:   5   PainLoc: Hand       PE:    AA&O x 4.  NAD  HEENT:  NCAT, sclera nonicteric  Lungs:  Respirations are equal and unlabored.  CV:  2+ bilateral upper and lower extremity pulses.  MSK:   Physical Exam      + grind test right basilar thumb, + compression and Tinels right wrist.  Neurovascularly intact bilaterally.  5/5 thenar and intrinsic musculature strength.  Full range of motion hands, wrists and elbows.    Diagnostic studies and other clinical records review:  Results       X-rays AP, lateral and oblique bilateral hand taken today are independently reviewed by me and shows Eaton stage II basilar thumb arthritis.     Assessment/Plan:   Encounter Diagnoses   Name Primary?    Primary osteoarthritis of first carpometacarpal joint of right hand Yes    Carpal tunnel syndrome of right wrist     Cubital tunnel syndrome on right     Cervical radiculopathy      Assessment & Plan     We have discussed the natural history of basilar thumb arthritis and carpal tunnel including treatment options such as splinting, oral and topical anti-inflammatories, cortisone injections and surgery. Comfort cool brace ordered for the right hand. EMG/NCS ordered for right hand.    -I have offered her a selective injection. I  have explained the risks, benefits, and alternatives of the procedure in detail.  The patient voices understanding and all questions have been answered. The patient agrees to proceed as planned. So after a sterile prep of the skin in the normal fashion the right carpal tunnel and right thumb CMC joint was injected using a 25 gauge needle with a combination of 1cc 1% plain lidocaine and 40 mg of methylprednisolone.  The patient is cautioned and immediate relief of pain is secondary to the local anesthetic and will be temporary.  After the anesthetic wears off there may be a increase in pain that may last for a few hours or a few days and they should use ice to help alleviate this flair up of pain. Patient tolerated the procedure well.      F/u as needed      Koki Melchor MD    Please be aware that this note has been generated with the assistance of [employer] voice-to-text.  Please excuse any spelling or grammatical errors.    This note was generated with the assistance of ambient listening technology. Verbal consent was obtained by the patient and accompanying visitor(s) for the recording of patient appointment to facilitate this note. I attest to having reviewed and edited the generated note for accuracy, though some syntax or spelling errors may persist. Please contact the author of this note for any clarification.

## 2024-05-10 ENCOUNTER — CLINICAL SUPPORT (OUTPATIENT)
Dept: REHABILITATION | Facility: HOSPITAL | Age: 44
End: 2024-05-10
Payer: COMMERCIAL

## 2024-05-10 DIAGNOSIS — M18.11 PRIMARY OSTEOARTHRITIS OF FIRST CARPOMETACARPAL JOINT OF RIGHT HAND: Primary | ICD-10-CM

## 2024-05-10 PROCEDURE — 97760 ORTHOTIC MGMT&TRAING 1ST ENC: CPT

## 2024-05-10 PROCEDURE — L3923 HFO WITHOUT JOINTS PRE CST: HCPCS

## 2024-05-10 NOTE — PATIENT INSTRUCTIONS
[facility]  Orthosis Instructions and Proof of Delivery        Date: 5/10/2024  Name: Sarah Isabel  MRN: [id number]  YOB: 1980  Referring Provider: No ref. provider found  Diagnosis: ______      HCPCS Level II Code and Description  ____    Orthosis Information  () Custom Fabricated                () Right                                      () Left                                           () Bilateral  () Static                                     () Static Progressive                    () Dynamic    Orthosis Instructions  () Wear the orthosis at all times  () Wear the orthosis at night only  () Wear the orthosis ____ times a day for ___ minute sessions  () Wear the orthosis as needed to minimize your symptoms  () Remove for hygiene, exercises, dressing changes    Cleaning and Maintenance  Keep your orthosis away from any heat sources. Do not leave in your car.  Keep your orthosis away from pets.  You may clean your orthosis with cool water and soap or a mild cleanser.  Your orthosis may need adjustments due to changes in your medical condition (swelling, dressing size, additional surgery, etc.)  Monitor your skin color and integrity.  Do not try to adjust the orthosis on your own.     If you have any questions or concerns, please contact the therapy office at [phone].     I, Sarah Isabel , have personally received the above described orthosis along with instructions on the wear, care, and precautions related to this orthosis. I understand that I am responsible for payment to [facility] of my deductible, coinsurance, or other portion of my charges not paid in full by my insurance plans, including any item that is not covered under the insurance plan.     Signature: _________________________________ Date: __________________    Therapist:

## 2024-05-10 NOTE — PLAN OF CARE
[facility] Occupational Therapy  Orthosis Note - Certificate of Medical Necessity      Date: 5/10/2024  Name: Sarah Isabel  Clinic Number: [id number]    Medical Diagnosis:   M18.11 (ICD-10-CM) - Primary osteoarthritis of first carpometacarpal joint of right hand   G56.01 (ICD-10-CM) - Carpal tunnel syndrome of right wrist     Therapy Diagnosis: No diagnosis found.  Precautions: Standard    Physician: No ref. provider found  Physician Orders: Custom splint fabrication Comfort Cool   Date of Order: 5/10/2024    Insurance Authorization period Expiration: 5/9/2025    Time In:9:00 am  Time Out: 9:30 am  [facility] Level II Code and Description:  right Cool Comfort splint with custom insert    Subjective     Involved Side: Right  Date of Onset: 1 year  Date of Surgery: N/A  Surgical Procedure: N/A  History of Current Condition: gradual onset of pain  Previous Therapy: no    Past Medical History/Physical Systems Review:   Past Medical History:   Diagnosis Date    Arthritis     hip    Asthma     childhood    Bipolar 1 disorder     Clotting disorder 2006    Factor 5    GERD (gastroesophageal reflux disease)     Low back pain due to displacement of intervertebral disc     Morbid obesity with BMI of 45.0-49.9, adult     Thyroid nodule 05/29/2017     Sarah Isabel  has a past surgical history that includes Anterior cervical discectomy w/ fusion (2016); Tonsillectomy (2007); Esophagogastroduodenoscopy; Laparoscopic hh with Toupet; Laparoscopic appendectomy (N/A, 02/04/2019); Nasal septoplasty (N/A, 09/25/2020); Nasal turbinate reduction (Bilateral, 09/25/2020); Upper gastrointestinal endoscopy; Tubal ligation; uterine ablation; Appendectomy; and Arthroscopy of knee (Right).    Sarah has a current medication list which includes the following prescription(s): acetazolamide, bupropion, cholecalciferol (vitamin d3), lamotrigine, lorazepam, rizatriptan, tramadol, and trintellix.    Review of patient's  allergies indicates:   Allergen Reactions    Percocet [oxycodone-acetaminophen] Nausea And Vomiting    Sulfa (sulfonamide antibiotics) Rash    Sulfadiazine     Phentermine Rash        Objective      None Pitting Mild Moderate Severe   Edema x          No Deficits Mild Deficits Mod Deficits Severe Deficits   ROM x          Custom Fabricated Orthosis  The custom orthosis was fabricated as requested using low-temperature plastic material. A pattern was measured then cut and custom molded to accommodate this individual patient.     Right CMC Cool Comfort size M with custom molded insert    Purpose of Orthosis  () Protect Recent Injury   () Protect Surgical Procedure  (x) Maintain Joint Positioning  () Increase Motion    HEP / Patient Instructions      See patient instructions section for orthosis instructions.  Patient was instructed verbally, signed, and provided with copy of written proof of delivery as well as information regarding wear schedule, care, and precautions of orthosis.    Assessment      The pre-ismael orthosis with custom insert appeared to fit well with no problems noted. There were no complaints of discomfort from the patient at this time. The patient demonstrated competency with independently doffing and donning orthosis.    Plan     Frequency and Duration: 1 x visit only  Orthosis to be worn with activities only.  Orthosis checks PRN.    I certify that the orthosis was performed by or under the direct supervision of a qualified Occupational Therapist.     Therapist: PAUL Nicole CHT

## 2024-05-15 ENCOUNTER — PATIENT MESSAGE (OUTPATIENT)
Dept: ORTHOPEDICS | Facility: CLINIC | Age: 44
End: 2024-05-15
Payer: COMMERCIAL

## 2024-05-26 RX ORDER — METHYLPREDNISOLONE ACETATE 40 MG/ML
40 INJECTION, SUSPENSION INTRA-ARTICULAR; INTRALESIONAL; INTRAMUSCULAR; SOFT TISSUE
Status: DISCONTINUED | OUTPATIENT
Start: 2024-05-08 | End: 2024-05-26 | Stop reason: HOSPADM

## 2024-06-06 DIAGNOSIS — G43.009 MIGRAINE WITHOUT AURA AND WITHOUT STATUS MIGRAINOSUS, NOT INTRACTABLE: ICD-10-CM

## 2024-06-07 RX ORDER — RIZATRIPTAN BENZOATE 10 MG/1
TABLET ORAL
Qty: 12 TABLET | Refills: 3 | Status: SHIPPED | OUTPATIENT
Start: 2024-06-07

## 2024-06-19 ENCOUNTER — PATIENT MESSAGE (OUTPATIENT)
Dept: NEUROLOGY | Facility: CLINIC | Age: 44
End: 2024-06-19
Payer: COMMERCIAL

## 2024-06-25 ENCOUNTER — PATIENT MESSAGE (OUTPATIENT)
Dept: PRIMARY CARE CLINIC | Facility: CLINIC | Age: 44
End: 2024-06-25
Payer: COMMERCIAL

## 2024-06-25 NOTE — TELEPHONE ENCOUNTER
Pt reports testing positive for COVID with home test, would like to know if rx can be prescribed to help with symptoms

## 2024-07-19 ENCOUNTER — TELEPHONE (OUTPATIENT)
Dept: SPINE | Facility: CLINIC | Age: 44
End: 2024-07-19
Payer: COMMERCIAL

## 2024-07-19 NOTE — TELEPHONE ENCOUNTER
----- Message from Amanda Christensen sent at 7/19/2024 11:49 AM CDT -----  Regarding: order request  Name of Who is Calling: Sarah           What is the request in detail: Patient is requesting a call back to find out if she can get orders for MRI done before her appointment.            Can the clinic reply by [facility]: Yes           What Number to Call Back if not in [facility]: [phone]

## 2024-08-12 ENCOUNTER — OFFICE VISIT (OUTPATIENT)
Dept: ORTHOPEDICS | Facility: CLINIC | Age: 44
End: 2024-08-12
Payer: COMMERCIAL

## 2024-08-12 DIAGNOSIS — G89.29 CHRONIC PAIN OF RIGHT WRIST: ICD-10-CM

## 2024-08-12 DIAGNOSIS — M18.11 PRIMARY OSTEOARTHRITIS OF FIRST CARPOMETACARPAL JOINT OF RIGHT HAND: ICD-10-CM

## 2024-08-12 DIAGNOSIS — G56.01 CARPAL TUNNEL SYNDROME OF RIGHT WRIST: ICD-10-CM

## 2024-08-12 DIAGNOSIS — M25.531 CHRONIC PAIN OF RIGHT WRIST: ICD-10-CM

## 2024-08-12 DIAGNOSIS — M65.4 TENOSYNOVITIS, DE QUERVAIN: Primary | ICD-10-CM

## 2024-08-12 PROCEDURE — 1159F MED LIST DOCD IN RCRD: CPT | Mod: CPTII,S$GLB,, | Performed by: ORTHOPAEDIC SURGERY

## 2024-08-12 PROCEDURE — 3044F HG A1C LEVEL LT 7.0%: CPT | Mod: CPTII,S$GLB,, | Performed by: ORTHOPAEDIC SURGERY

## 2024-08-12 PROCEDURE — 99999 PR PBB SHADOW E&M-EST. PATIENT-LVL II: CPT | Mod: PBBFAC,,, | Performed by: ORTHOPAEDIC SURGERY

## 2024-08-12 PROCEDURE — 1160F RVW MEDS BY RX/DR IN RCRD: CPT | Mod: CPTII,S$GLB,, | Performed by: ORTHOPAEDIC SURGERY

## 2024-08-12 PROCEDURE — 99214 OFFICE O/P EST MOD 30 MIN: CPT | Mod: 25,S$GLB,, | Performed by: ORTHOPAEDIC SURGERY

## 2024-08-12 PROCEDURE — 20550 NJX 1 TENDON SHEATH/LIGAMENT: CPT | Mod: RT,S$GLB,, | Performed by: ORTHOPAEDIC SURGERY

## 2024-08-12 RX ADMIN — METHYLPREDNISOLONE ACETATE 40 MG: 40 INJECTION, SUSPENSION INTRA-ARTICULAR; INTRALESIONAL; INTRAMUSCULAR; SOFT TISSUE at 03:08

## 2024-08-12 NOTE — PROGRESS NOTES
Sarah Isabel presents for follow up evaluation of   Encounter Diagnoses   Name Primary?    Tenosynovitis, de Quervain Yes    Carpal tunnel syndrome of right wrist     Primary osteoarthritis of first carpometacarpal joint of right hand     Chronic pain of right wrist      History of Present Illness  The patient presents for evaluation of hand pain.    She reports experiencing a shocking sensation in her hands. She describes numbness that travels up her arm when she reaches for her drink, typically placed on the right side of her desk. This numbness subsides when she moves her arm. She also mentions a slight improvement in her fingers. She notes that the injection provided relief for her thumb, but the pain returned after approximately 3 months. She does not use a brace at night for her carpal tunnel syndrome.    She has a history of neck fusion surgery and continues to experience neck issues. She has not undergone a nerve study for her neck. She reports no numbness on the left side.     Vitals:    08/12/24 1518   PainSc:   5   PainLoc: Hand       PE:    AA&O x 4.  NAD  HEENT:  NCAT, sclera nonicteric  Lungs:  Respirations are equal and unlabored.  CV:  2+ bilateral upper and lower extremity pulses.  MSK:   Physical Exam      Positive compression, Tinel's and Phalen's right wrist, positive Finkelstein's test right wrist.  Neurovascularly intact bilaterally.  5/5 thenar and intrinsic musculature strength.  Full range of motion hands, wrists and elbows.    Diagnostic studies and other clinical records review:  Results      Assessment/Plan:   Encounter Diagnoses   Name Primary?    Tenosynovitis, de Quervain Yes    Carpal tunnel syndrome of right wrist     Primary osteoarthritis of first carpometacarpal joint of right hand     Chronic pain of right wrist      Assessment & Plan  The patient and I had a thorough discussion today. We discussed the working diagnosis as well as several other potential alternative  diagnoses. Treatment options were discussed, both conservative and surgical. Conservative treatment options would include things such as activity modifications, workplace modifications, a period of rest, oral vs topical OTC and prescription anti-inflammatory medications, occupational therapy, splinting/bracing, immobilization, corticosteroid injections, and others. Surgical options were discussed as well. I have recommended a thumb spica brace. I have ordered an EMG/NCS.    -I have offered her a selective injection. I have explained the risks, benefits, and alternatives of the procedure in detail.  The patient voices understanding and all questions have been answered. The patient agrees to proceed as planned. So after a sterile prep of the skin in the normal fashion the right 1st dorsal extensor compartment injected using a 25 gauge needle with a combination of 1cc 1% plain lidocaine and 40 mg of methylprednisolone.  The patient is cautioned and immediate relief of pain is secondary to the local anesthetic and will be temporary.  After the anesthetic wears off there may be a increase in pain that may last for a few hours or a few days and they should use ice to help alleviate this flair up of pain. Patient tolerated the procedure well.    F/U after EMG/NCS  Call with any questions/concerns in the interim            Koki Melchor MD    Please be aware that this note has been generated with the assistance of Audacious voice-to-text.  Please excuse any spelling or grammatical errors.    This note was generated with the assistance of ambient listening technology. Verbal consent was obtained by the patient and accompanying visitor(s) for the recording of patient appointment to facilitate this note. I attest to having reviewed and edited the generated note for accuracy, though some syntax or spelling errors may persist. Please contact the author of this note for any clarification.

## 2024-08-12 NOTE — PROCEDURES
Tendon Sheath    Date/Time: 8/12/2024 3:15 PM    Performed by: Koki Melchor MD  Authorized by: Koki Melchor MD    Consent Done?:  Yes (Verbal)  Indications:  Pain  Prep: patient was prepped and draped in usual sterile fashion      Local anesthesia used?: Yes    Anesthesia:  Local infiltration  Local anesthetic:  Lidocaine 1% without epinephrine  Anesthetic total (ml):  1    Location:  Wrist  Site:  R first doral compartment  Needle size:  25 G  Approach:  Radial  Medications:  40 mg methylPREDNISolone acetate 40 mg/mL  Patient tolerance:  Patient tolerated the procedure well with no immediate complications     [id number] Comprehensive

## 2024-08-13 ENCOUNTER — PATIENT MESSAGE (OUTPATIENT)
Dept: ORTHOPEDICS | Facility: CLINIC | Age: 44
End: 2024-08-13
Payer: COMMERCIAL

## 2024-09-21 ENCOUNTER — HOSPITAL ENCOUNTER (EMERGENCY)
Facility: HOSPITAL | Age: 44
Discharge: HOME | End: 2024-09-22
Attending: EMERGENCY MEDICINE
Payer: COMMERCIAL

## 2024-09-21 DIAGNOSIS — S99.912A INJURY OF LEFT ANKLE, INITIAL ENCOUNTER: Primary | ICD-10-CM
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
## 2024-09-21 LAB
B-HCG UR QL: NEGATIVE
CTP QC/QA: YES

## 2024-09-21 PROCEDURE — 99284 EMERGENCY DEPT VISIT MOD MDM: CPT | Mod: 25

## 2024-09-21 PROCEDURE — 81025 URINE PREGNANCY TEST: CPT | Performed by: NURSE PRACTITIONER

## 2024-09-21 PROCEDURE — 63600175 PHARM REV CODE 636 W HCPCS: Performed by: NURSE PRACTITIONER

## 2024-09-21 PROCEDURE — 25000003 PHARM REV CODE 250: Performed by: NURSE PRACTITIONER

## 2024-09-21 PROCEDURE — 96372 THER/PROPH/DIAG INJ SC/IM: CPT | Performed by: NURSE PRACTITIONER

## 2024-09-21 RX ORDER — HYDROCODONE BITARTRATE AND ACETAMINOPHEN 5; 325 MG/1; MG/1
1 TABLET ORAL
Status: COMPLETED | OUTPATIENT
Start: 2024-09-21 | End: 2024-09-21

## 2024-09-21 RX ORDER — ONDANSETRON 8 MG/1
8 TABLET, ORALLY DISINTEGRATING ORAL
Status: COMPLETED | OUTPATIENT
Start: 2024-09-21 | End: 2024-09-21

## 2024-09-21 RX ORDER — KETOROLAC TROMETHAMINE 30 MG/ML
30 INJECTION, SOLUTION INTRAMUSCULAR; INTRAVENOUS
Status: COMPLETED | OUTPATIENT
Start: 2024-09-21 | End: 2024-09-21

## 2024-09-21 RX ORDER — NAPROXEN 500 MG/1
500 TABLET ORAL 2 TIMES DAILY WITH MEALS
Qty: 10 TABLET | Refills: 0 | Status: SHIPPED | OUTPATIENT
Start: 2024-09-21 | End: 2024-09-26

## 2024-09-21 RX ADMIN — ONDANSETRON 8 MG: 8 TABLET, ORALLY DISINTEGRATING ORAL at 10:09

## 2024-09-21 RX ADMIN — KETOROLAC TROMETHAMINE 30 MG: 30 INJECTION, SOLUTION INTRAMUSCULAR at 10:09

## 2024-09-21 RX ADMIN — HYDROCODONE BITARTRATE AND ACETAMINOPHEN 1 TABLET: 5; 325 TABLET ORAL at 10:09

## 2024-09-21 NOTE — Clinical Note
"Sarah Boydramona Isabel was seen and treated in our emergency department on 9/21/2024.  She may return to work on 09/24/2024.       If you have any questions or concerns, please don't hesitate to call.      Rowdy Gant PA-C"

## 2024-09-22 VITALS
SYSTOLIC BLOOD PRESSURE: 132 MMHG | HEART RATE: 96 BPM | HEIGHT: 60 IN | OXYGEN SATURATION: 100 % | DIASTOLIC BLOOD PRESSURE: 81 MMHG | RESPIRATION RATE: 18 BRPM | TEMPERATURE: 98 F | WEIGHT: 220 LBS | BODY MASS INDEX: 43.19 KG/M2

## 2024-09-22 NOTE — ED NOTES
While going down steps tonight, pt missed a step and fell down 2 steps landing on her left side.  Pt presents w/ c/o left ankle pain and swelling, neuro intact.  Denies LOC or blood thinners.  Denies mid-line c-spine tenderness, numbness or tingling, chest, back or abd pain. P is AAOx3, resp even and unlabored, skin warm and dry. NAD noted.  at bedside.

## 2024-09-22 NOTE — ED PROVIDER NOTES
Encounter Date: 9/21/2024       History     Chief Complaint   Patient presents with    Ankle Pain     C/o left ankle pain s/p fall down stairs 30 min pta.  10/10 pain.  Unable to bear weight to affected extremity. Denies LOC or n/v.       44-year-old female presenting for evaluation of left ankle pain after accidentally tripping and falling down 2 stairs while at home just prior to arrival.  Pain is to the lateral aspect of the left ankle and to the left foot.  Denies headache, loss of consciousness, neck pain, back pain, chest pain, abdominal pain.  She has not taken any medications or attempted any other treatments for her symptoms.    The history is provided by the patient. No  was used.     Review of patient's allergies indicates:   Allergen Reactions    Percocet [oxycodone-acetaminophen] Nausea And Vomiting    Sulfa (sulfonamide antibiotics) Rash    Sulfadiazine     Phentermine Rash     Past Medical History:   Diagnosis Date    Arthritis     hip    Asthma     childhood    Bipolar 1 disorder     Clotting disorder 2006    Factor 5    GERD (gastroesophageal reflux disease)     Low back pain due to displacement of intervertebral disc     Morbid obesity with BMI of 45.0-49.9, adult     Thyroid nodule 05/29/2017     Past Surgical History:   Procedure Laterality Date    ANTERIOR CERVICAL DISCECTOMY W/ FUSION  2016    APPENDECTOMY      ARTHROSCOPY OF KNEE Right     ESOPHAGOGASTRODUODENOSCOPY      LAPAROSCOPIC APPENDECTOMY N/A 02/04/2019    Procedure: APPENDECTOMY, LAPAROSCOPIC;  Surgeon: Maikol Banks Jr., MD;  Location: [facility];  Service: General;  Laterality: N/A;    Laparoscopic hh with Toupet      NASAL SEPTOPLASTY N/A 09/25/2020    Procedure: SEPTOPLASTY, NOSE;  Surgeon: Mike Alexander MD;  Location: [address];  Service: ENT;  Laterality: N/A;    NASAL TURBINATE REDUCTION Bilateral 09/25/2020    Procedure: REDUCTION, NASAL TURBINATE;  Surgeon: Mike Alexander MD;  Location:  [facility] OR 2ND FLR;  Service: ENT;  Laterality: Bilateral;    TONSILLECTOMY  2007    TUBAL LIGATION      UPPER GASTROINTESTINAL ENDOSCOPY      uterine ablation       Family History   Problem Relation Name Age of Onset    Hypertension Father      Diabetes Paternal Grandfather      Asthma Maternal Uncle      Asthma Cousin      Cancer Neg Hx      Heart disease Neg Hx      Stroke Neg Hx       Social History     Tobacco Use    Smoking status: Former     Current packs/day: 0.00     Average packs/day: 0.3 packs/day for 10.0 years (2.5 ttl pk-yrs)     Types: Cigarettes     Start date: 2002     Quit date: 2012     Years since quittin.6     Passive exposure: Never    Smokeless tobacco: Never    Tobacco comments:     quit smoking    Substance Use Topics    Alcohol use: Not Currently    Drug use: No     Review of Systems   Constitutional:  Negative for chills, fatigue and fever.   HENT:  Negative for congestion, ear pain, nosebleeds, postnasal drip, rhinorrhea, sinus pressure and sore throat.    Eyes:  Negative for photophobia and pain.   Respiratory:  Negative for apnea, cough, choking, chest tightness, shortness of breath, wheezing and stridor.    Cardiovascular:  Negative for chest pain, palpitations and leg swelling.   Gastrointestinal:  Negative for abdominal pain, constipation, diarrhea, nausea and vomiting.   Genitourinary:  Negative for dysuria, flank pain, hematuria, pelvic pain and vaginal discharge.   Musculoskeletal:  Positive for arthralgias. Negative for back pain, gait problem and neck pain.   Skin:  Negative for color change, pallor, rash and wound.   Neurological:  Negative for dizziness, seizures, syncope, facial asymmetry, light-headedness and headaches.   Hematological:  Negative for adenopathy.   Psychiatric/Behavioral:  Negative for confusion. The patient is not nervous/anxious.        Physical Exam     Initial Vitals [24 2218]   BP Pulse Resp Temp SpO2   (!) 146/93 103 18 98.3 °F  (36.8 °C) 100 %      MAP       --         Physical Exam    Nursing note and vitals reviewed.  Constitutional: She appears well-developed and well-nourished. She is not diaphoretic. No distress.   HENT:   Head: Normocephalic and atraumatic.   Right Ear: External ear normal.   Left Ear: External ear normal.   Nose: Nose normal.   Eyes: Conjunctivae and EOM are normal. Right eye exhibits no discharge. Left eye exhibits no discharge.   Neck: Neck supple. No tracheal deviation present.   Normal range of motion.  Cardiovascular:  Normal rate.           Pulmonary/Chest: No stridor. No respiratory distress.   Abdominal: Abdomen is soft. She exhibits no distension. There is no abdominal tenderness.   Musculoskeletal:         General: Tenderness present.      Cervical back: Normal range of motion and neck supple.      Comments: Tenderness and swelling to the left ankle overlying the lateral malleolus.  DP pulse 2 +.  Range of motion intact but exacerbates pain.     Neurological: She is alert and oriented to person, place, and time. She has normal strength. No cranial nerve deficit or sensory deficit.   Skin: Skin is warm and dry.   Psychiatric: She has a normal mood and affect. Her behavior is normal. Judgment and thought content normal.         ED Course   Procedures  Labs Reviewed   POCT URINE PREGNANCY       Result Value    POC Preg Test, Ur Negative       Acceptable Yes            Imaging Results              X-Ray Ankle Complete Left (In process)                      X-Ray Foot Complete Left (In process)                      Medications   ketorolac injection 30 mg (30 mg Intramuscular Given 9/21/24 2247)   HYDROcodone-acetaminophen 5-325 mg per tablet 1 tablet (1 tablet Oral Given 9/21/24 2245)   ondansetron disintegrating tablet 8 mg (8 mg Oral Given 9/21/24 2246)     Medical Decision Making  Patient with left ankle pain after falling down stairs as above.  Ordered x-rays of the left ankle and foot.   Treated with Toradol and Letts in the ED. case signed out to NURY Taylor at the end of my shift pending x-ray results and disposition.    Amount and/or Complexity of Data Reviewed  Labs: ordered.  Radiology: ordered.    Risk  Prescription drug management.                                      Clinical Impression:  Final diagnoses:  [W10.8XXA] Fall down stairs, initial encounter  [S99.912A] Injury of left ankle, initial encounter (Primary)  [M25.572] Acute left ankle pain                 Ronen Scherer, NP  09/21/24 2824

## 2024-09-22 NOTE — DISCHARGE INSTRUCTIONS
Problem Specific Instructions:  Any bone/joint/muscle injury, regardless of broken bones or not may take between 4-6 weeks to heal.  You may ice it throughout the day, compress it with something like an Ace wrap or compression sleeve and elevate it above your heart regularly to prevent or reduce swelling. If you are not improving in that time, follow-up with your primary care provider or orthopedics for re-evaluation. Return to the Emergency Department if you experience worsening pain, numbness, tingling, change of color in the body part, or any other concerning symptoms.     If you would like to follow up with the [facility] for further care of your fracture, please call the [facility] Scheduling Department at [phone] during business hours. Please let the  know you need a fracture follow-up appointment with Orthopedics, and you will be scheduled in the Orthopedic Clinic. Please bring your original Emergency Department discharge papers and disc with you to the clinic appointment.     If you received or are discharged with pain medicine or muscle relaxers, understand that they can make you sleepy or impair your judgement. Do not make important decisions, drink, drive, swim or perform any other tasks you would not otherwise perform while impaired for at least 24 hours after your last dose.      Ensure you follow up with your Primary Care Provider or any additional providers listed on this discharge sheet. While you may be healthy enough to go home today, I cannot predict the exact course of your diagnoses. It is important to remember that some problems are difficult to diagnose and may not be found during your first visit. As such, it is your responsibility to monitor symptoms, follow-up with another healthcare provider, or return to the emergency room for new or worsening concerns. Unless otherwise instructed, continue all home medications and any new medications prescribed to  you in the Emergency Department.

## 2024-09-23 ENCOUNTER — PATIENT MESSAGE (OUTPATIENT)
Dept: PRIMARY CARE CLINIC | Facility: CLINIC | Age: 44
End: 2024-09-23
Payer: COMMERCIAL

## 2024-09-23 DIAGNOSIS — K92.1 BLOOD IN STOOL: Primary | ICD-10-CM

## 2024-09-23 NOTE — TELEPHONE ENCOUNTER
Pt reports that she was prescribed naproxen for recent fall and ankle sprain    Today she had a BM and noticed blood

## 2024-09-24 NOTE — TELEPHONE ENCOUNTER
I sent her a Cosyforyout message please see how she is feeling in general and review with her and help schedule Colorectal appointment

## 2024-09-25 ENCOUNTER — HOSPITAL ENCOUNTER (OUTPATIENT)
Dept: RADIOLOGY | Facility: HOSPITAL | Age: 44
Discharge: HOME | End: 2024-09-25
Attending: FAMILY MEDICINE
Payer: COMMERCIAL

## 2024-09-25 ENCOUNTER — TELEPHONE (OUTPATIENT)
Dept: PRIMARY CARE CLINIC | Facility: CLINIC | Age: 44
End: 2024-09-25

## 2024-09-25 ENCOUNTER — OFFICE VISIT (OUTPATIENT)
Dept: PRIMARY CARE CLINIC | Facility: CLINIC | Age: 44
End: 2024-09-25
Payer: COMMERCIAL

## 2024-09-25 VITALS
BODY MASS INDEX: 44.46 KG/M2 | SYSTOLIC BLOOD PRESSURE: 126 MMHG | HEIGHT: 60 IN | RESPIRATION RATE: 14 BRPM | WEIGHT: 226.44 LBS | DIASTOLIC BLOOD PRESSURE: 76 MMHG | HEART RATE: 84 BPM | OXYGEN SATURATION: 97 %

## 2024-09-25 DIAGNOSIS — M25.572 PAIN AND SWELLING OF LEFT ANKLE: ICD-10-CM

## 2024-09-25 DIAGNOSIS — F31.9 BIPOLAR 1 DISORDER: ICD-10-CM

## 2024-09-25 DIAGNOSIS — Z00.00 ANNUAL PHYSICAL EXAM: Primary | ICD-10-CM

## 2024-09-25 DIAGNOSIS — Z13.220 ENCOUNTER FOR LIPID SCREENING FOR CARDIOVASCULAR DISEASE: ICD-10-CM

## 2024-09-25 DIAGNOSIS — G93.2 PSEUDOTUMOR CEREBRI SYNDROME: ICD-10-CM

## 2024-09-25 DIAGNOSIS — E66.813 CLASS 3 SEVERE OBESITY DUE TO EXCESS CALORIES WITH SERIOUS COMORBIDITY AND BODY MASS INDEX (BMI) OF 40.0 TO 44.9 IN ADULT: ICD-10-CM

## 2024-09-25 DIAGNOSIS — M25.472 PAIN AND SWELLING OF LEFT ANKLE: ICD-10-CM

## 2024-09-25 DIAGNOSIS — Z13.6 ENCOUNTER FOR LIPID SCREENING FOR CARDIOVASCULAR DISEASE: ICD-10-CM
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
## 2024-09-25 DIAGNOSIS — M53.3 SACROILIAC JOINT PAIN: ICD-10-CM

## 2024-09-25 DIAGNOSIS — R73.03 PREDIABETES: ICD-10-CM

## 2024-09-25 DIAGNOSIS — M54.50 ACUTE MIDLINE LOW BACK PAIN WITHOUT SCIATICA: ICD-10-CM

## 2024-09-25 DIAGNOSIS — D68.51 FACTOR 5 LEIDEN MUTATION, HETEROZYGOUS: ICD-10-CM

## 2024-09-25 DIAGNOSIS — E61.1 IRON DEFICIENCY: ICD-10-CM
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
## 2024-09-25 DIAGNOSIS — M79.605 LEG PAIN, LEFT: ICD-10-CM

## 2024-09-25 DIAGNOSIS — Z23 NEED FOR VACCINATION: ICD-10-CM

## 2024-09-25 DIAGNOSIS — Z98.890 S/P LAPAROSCOPIC FUNDOPLICATION: ICD-10-CM

## 2024-09-25 DIAGNOSIS — M54.9 MID-BACK PAIN, ACUTE: ICD-10-CM

## 2024-09-25 DIAGNOSIS — K76.0 HEPATIC STEATOSIS: ICD-10-CM

## 2024-09-25 DIAGNOSIS — Z23 NEED FOR PNEUMOCOCCAL 20-VALENT CONJUGATE VACCINATION: ICD-10-CM

## 2024-09-25 DIAGNOSIS — E66.01 CLASS 3 SEVERE OBESITY DUE TO EXCESS CALORIES WITH SERIOUS COMORBIDITY AND BODY MASS INDEX (BMI) OF 40.0 TO 44.9 IN ADULT: ICD-10-CM

## 2024-09-25 DIAGNOSIS — E55.9 VITAMIN D DEFICIENCY: ICD-10-CM

## 2024-09-25 DIAGNOSIS — M79.605 PAIN OF LEFT LOWER EXTREMITY: ICD-10-CM

## 2024-09-25 PROCEDURE — 90677 PCV20 VACCINE IM: CPT | Mod: S$GLB,,, | Performed by: FAMILY MEDICINE

## 2024-09-25 PROCEDURE — 99396 PREV VISIT EST AGE 40-64: CPT | Mod: 25,S$GLB,, | Performed by: FAMILY MEDICINE

## 2024-09-25 PROCEDURE — 90472 IMMUNIZATION ADMIN EACH ADD: CPT | Mod: 59,S$GLB,, | Performed by: FAMILY MEDICINE

## 2024-09-25 PROCEDURE — 3078F DIAST BP <80 MM HG: CPT | Mod: CPTII,S$GLB,, | Performed by: FAMILY MEDICINE

## 2024-09-25 PROCEDURE — 90656 IIV3 VACC NO PRSV 0.5 ML IM: CPT | Mod: S$GLB,,, | Performed by: FAMILY MEDICINE

## 2024-09-25 PROCEDURE — 3074F SYST BP LT 130 MM HG: CPT | Mod: CPTII,S$GLB,, | Performed by: FAMILY MEDICINE

## 2024-09-25 PROCEDURE — 72110 X-RAY EXAM L-2 SPINE 4/>VWS: CPT | Mod: TC,FY,PO

## 2024-09-25 PROCEDURE — 90471 IMMUNIZATION ADMIN: CPT | Mod: 59,S$GLB,, | Performed by: FAMILY MEDICINE

## 2024-09-25 PROCEDURE — 73590 X-RAY EXAM OF LOWER LEG: CPT | Mod: 26,LT,, | Performed by: RADIOLOGY

## 2024-09-25 PROCEDURE — 3044F HG A1C LEVEL LT 7.0%: CPT | Mod: CPTII,S$GLB,, | Performed by: FAMILY MEDICINE

## 2024-09-25 PROCEDURE — 72202 X-RAY EXAM SI JOINTS 3/> VWS: CPT | Mod: TC,FY,PO

## 2024-09-25 PROCEDURE — 99999 PR PBB SHADOW E&M-EST. PATIENT-LVL V: CPT | Mod: PBBFAC,,, | Performed by: FAMILY MEDICINE

## 2024-09-25 PROCEDURE — 96372 THER/PROPH/DIAG INJ SC/IM: CPT | Mod: S$GLB,,, | Performed by: FAMILY MEDICINE

## 2024-09-25 PROCEDURE — 72080 X-RAY EXAM THORACOLMB 2/> VW: CPT | Mod: TC,PO

## 2024-09-25 PROCEDURE — 73590 X-RAY EXAM OF LOWER LEG: CPT | Mod: TC,FY,PO,LT

## 2024-09-25 PROCEDURE — 3008F BODY MASS INDEX DOCD: CPT | Mod: CPTII,S$GLB,, | Performed by: FAMILY MEDICINE

## 2024-09-25 RX ORDER — KETOROLAC TROMETHAMINE 30 MG/ML
30 INJECTION, SOLUTION INTRAMUSCULAR; INTRAVENOUS
Status: COMPLETED | OUTPATIENT
Start: 2024-09-25 | End: 2024-09-25

## 2024-09-25 RX ORDER — MELOXICAM 15 MG/1
15 TABLET ORAL DAILY
Qty: 30 TABLET | Refills: 0 | Status: SHIPPED | OUTPATIENT
Start: 2024-09-25

## 2024-09-25 RX ADMIN — KETOROLAC TROMETHAMINE 30 MG: 30 INJECTION, SOLUTION INTRAMUSCULAR; INTRAVENOUS at 02:09

## 2024-09-25 NOTE — PROGRESS NOTES
Subjective:       Patient ID: Sarah Isabel is a 44 y.o. female.    Chief Complaint: Ankle Pain, Back Pain, and Annual Exam    HPI  43 y/o female former smoker (quit 2012) with migraines, hx of GERD s/p fundolipation in 3/2017, hx of thyroid nodules, Factor V Leiden def (dx in 2006), CLBP, Bipolar 1, Pseudotumor Cerebri and prediabetes, Class 3 obesity BMI 44 is here for annual exam.     She had a trip and fall down the stairs on Sat, she landed on left ankle and then fell to her back, she was seen in ER on 9/21/24 xrays reassuring, she was discharged with boot and Naproxen, her L ankle pain is about the same has not really improved, the Naproxen does not help, ice helps some, she has constant sharp and achy pain in L ankle, 4/10.     She has been having lower back pain since Sunday this is different than her L sided lower back pain, pain is pressure and achy and constant it spreads to her abdomen,  4/10, the Naproxen did not help, she tried Ultram which helped some, she denies saddle anesthesia/leg weakness/bowel or bladder incontinence.     She is feeling good otherwise, she denies f/n/v/d/constipation/cp/sob/urinary sx. Appetite is ok. She started back at work on Monday. She is not sleeping well secondary to back pain as she cannot get comfortable.      Hx of Covid 2021, 6/2024  IBS-D: following with GI  Fatty liver/elevated ALT: due for visit with hepatology   Prediabetes/Class 3 obesity BMI 44: A1c 5.8 4/2024  Hx of hiatal hernia/gastritis s/p lap fundolipation: omeprazole prn, using once every 2 weeks  Migraines/Pseudotumor Cerebri: following with neurology, Diamox and Maxalt  Vitamin D def: D3 2,000 IU daily  Hx of thyroid nodules:US 5/2017  Bipolar 1: followed by Dr. Mata, lamotrigine 150 mg bid, wellbutrin 200 mg bid, trintellix 10 mg daily, ativan prn (rare use as it makes her tired)  CLBP and Neck pain: following with Dr. Stern, tramadol 50 mg prn, did not tolerate flexeril secondary to  fatigue  GYN: following with Dr. Catalan, pelvic utd, Uterine fibroids, mmg overdue has it scheduled  Eye exam utd [facility]  Dental utd     Review of Systems    Objective:      /76 (BP Location: Right arm, Patient Position: Sitting, BP Method: Large (Manual))   Pulse 84   Resp 14   Ht 5' (1.524 m)   Wt 102.7 kg (226 lb 6.6 oz)   SpO2 97%   BMI 44.22 kg/m²   Physical Exam  Vitals and nursing note reviewed.   Constitutional:       Appearance: She is well-developed.   HENT:      Head: Normocephalic and atraumatic.      Mouth/Throat:      Pharynx: No oropharyngeal exudate.   Neck:      Thyroid: No thyromegaly.   Cardiovascular:      Rate and Rhythm: Normal rate and regular rhythm.      Heart sounds: Normal heart sounds.   Pulmonary:      Effort: Pulmonary effort is normal. No respiratory distress.      Breath sounds: Normal breath sounds.   Musculoskeletal:         General: Tenderness present.      Cervical back: Normal range of motion and neck supple.      Left lower leg: Edema present.      Comments: L lateral foot bruising,1+ edema over L foot and ankle, tender to light touch   Lymphadenopathy:      Cervical: No cervical adenopathy.   Skin:     General: Skin is warm and dry.   Neurological:      Mental Status: She is alert.         Assessment:       1. Annual physical exam    2. Class 3 severe obesity due to excess calories with serious comorbidity and body mass index (BMI) of 40.0 to 44.9 in adult    3. Need for pneumococcal 20-valent conjugate vaccination    4. Prediabetes    5. Hepatic steatosis    6. Pseudotumor cerebri syndrome    7. Vitamin D deficiency    8. S/P laparoscopic fundoplication    9. Factor 5 Leiden mutation, heterozygous    10. Iron deficiency    11. Encounter for lipid screening for cardiovascular disease    12. Fall, sequela    13. Mid-back pain, acute    14. Acute midline low back pain without sciatica    15. Leg pain, left    16. Sacroiliac joint pain    17. Pain and swelling  of left ankle    18. Need for vaccination    19. Bipolar 1 disorder        Plan:   Sarah was seen today for ankle pain, back pain and annual exam.    Diagnoses and all orders for this visit:    Annual physical exam  -     CBC Auto Differential; Future  -     Comprehensive Metabolic Panel; Future  -     Hemoglobin A1C; Future  -     Lipid Panel; Future  -     TSH; Future  -     Vitamin D; Future  -     Vitamin B12; Future  -     Iron and TIBC; Future  -     Ferritin; Future    Class 3 severe obesity due to excess calories with serious comorbidity and body mass index (BMI) of 40.0 to 44.9 in adult  -     CBC Auto Differential; Future  -     Comprehensive Metabolic Panel; Future  -     Hemoglobin A1C; Future  -     Lipid Panel; Future    Need for pneumococcal 20-valent conjugate vaccination  -     pneumoc 20-javad conj-dip cr(PF) (PREVNAR-20 (PF)) injection Syrg 0.5 mL    Prediabetes  -     CBC Auto Differential; Future  -     Comprehensive Metabolic Panel; Future  -     Hemoglobin A1C; Future  -     Lipid Panel; Future  -     Vitamin B12; Future    Hepatic steatosis  -     Comprehensive Metabolic Panel; Future  -     Hemoglobin A1C; Future  -     Lipid Panel; Future    Pseudotumor cerebri syndrome    Vitamin D deficiency  -     Vitamin D; Future    S/P laparoscopic fundoplication    Factor 5 Leiden mutation, heterozygous    Iron deficiency  -     CBC Auto Differential; Future  -     Iron and TIBC; Future  -     Ferritin; Future    Encounter for lipid screening for cardiovascular disease  -     Lipid Panel; Future    Fall, sequela  -     X-Ray Sacroiliac Joints Complete; Future  -     X-Ray Lumbar Spine 5 View; Future  -     X-Ray Thoracolumbar Spine AP Lateral; Future  -     Cancel: X-Ray Tibia Fibula 2 View Right; Future  -     ketorolac injection 30 mg  -     Ambulatory referral/consult to Orthopedics; Future    Mid-back pain, acute  -     X-Ray Thoracolumbar Spine AP Lateral; Future  -     ketorolac injection 30  mg    Acute midline low back pain without sciatica  -     X-Ray Lumbar Spine 5 View; Future  -     ketorolac injection 30 mg    Leg pain, left  -     Cancel: X-Ray Tibia Fibula 2 View Right; Future  -     ketorolac injection 30 mg  -     Ambulatory referral/consult to Orthopedics; Future    Sacroiliac joint pain  -     X-Ray Sacroiliac Joints Complete; Future  -     ketorolac injection 30 mg    Pain and swelling of left ankle  -     meloxicam (MOBIC) 15 MG tablet; Take 1 tablet (15 mg total) by mouth once daily.  -     ketorolac injection 30 mg  -     Ambulatory referral/consult to Orthopedics; Future    Need for vaccination  -     Influenza - Trivalent - PF (ADULT)    Bipolar 1 disorder

## 2024-09-26 RX ORDER — METHYLPREDNISOLONE ACETATE 40 MG/ML
40 INJECTION, SUSPENSION INTRA-ARTICULAR; INTRALESIONAL; INTRAMUSCULAR; SOFT TISSUE
Status: DISCONTINUED | OUTPATIENT
Start: 2024-08-12 | End: 2024-09-26 | Stop reason: HOSPADM

## 2024-09-28 ENCOUNTER — LAB VISIT (OUTPATIENT)
Dept: LAB | Facility: HOSPITAL | Age: 44
End: 2024-09-28
Attending: FAMILY MEDICINE
Payer: COMMERCIAL

## 2024-09-28 DIAGNOSIS — K76.0 HEPATIC STEATOSIS: ICD-10-CM

## 2024-09-28 DIAGNOSIS — E55.9 VITAMIN D DEFICIENCY: ICD-10-CM

## 2024-09-28 DIAGNOSIS — E61.1 IRON DEFICIENCY: ICD-10-CM

## 2024-09-28 DIAGNOSIS — Z13.220 ENCOUNTER FOR LIPID SCREENING FOR CARDIOVASCULAR DISEASE: ICD-10-CM

## 2024-09-28 DIAGNOSIS — E66.01 CLASS 3 SEVERE OBESITY DUE TO EXCESS CALORIES WITH SERIOUS COMORBIDITY AND BODY MASS INDEX (BMI) OF 40.0 TO 44.9 IN ADULT: ICD-10-CM

## 2024-09-28 DIAGNOSIS — Z00.00 ANNUAL PHYSICAL EXAM: ICD-10-CM

## 2024-09-28 DIAGNOSIS — Z13.6 ENCOUNTER FOR LIPID SCREENING FOR CARDIOVASCULAR DISEASE: ICD-10-CM

## 2024-09-28 DIAGNOSIS — R73.03 PREDIABETES: ICD-10-CM

## 2024-09-28 DIAGNOSIS — E66.813 CLASS 3 SEVERE OBESITY DUE TO EXCESS CALORIES WITH SERIOUS COMORBIDITY AND BODY MASS INDEX (BMI) OF 40.0 TO 44.9 IN ADULT: ICD-10-CM

## 2024-09-28 LAB
25(OH)D3+25(OH)D2 SERPL-MCNC: 42 NG/ML (ref 30–96)
ALBUMIN SERPL BCP-MCNC: 3.6 G/DL (ref 3.5–5.2)
ALP SERPL-CCNC: 70 U/L (ref 55–135)
ALT SERPL W/O P-5'-P-CCNC: 20 U/L (ref 10–44)
ANION GAP SERPL CALC-SCNC: 8 MMOL/L (ref 8–16)
AST SERPL-CCNC: 23 U/L (ref 10–40)
BASOPHILS # BLD AUTO: 0.04 K/UL (ref 0–0.2)
BASOPHILS NFR BLD: 0.6 % (ref 0–1.9)
BILIRUB SERPL-MCNC: 0.3 MG/DL (ref 0.1–1)
BUN SERPL-MCNC: 9 MG/DL (ref 6–20)
CALCIUM SERPL-MCNC: 8.8 MG/DL (ref 8.7–10.5)
CHLORIDE SERPL-SCNC: 115 MMOL/L (ref 95–110)
CHOLEST SERPL-MCNC: 187 MG/DL (ref 120–199)
CHOLEST/HDLC SERPL: 3.9 {RATIO} (ref 2–5)
CO2 SERPL-SCNC: 18 MMOL/L (ref 23–29)
CREAT SERPL-MCNC: 0.9 MG/DL (ref 0.5–1.4)
DIFFERENTIAL METHOD BLD: NORMAL
EOSINOPHIL # BLD AUTO: 0.3 K/UL (ref 0–0.5)
EOSINOPHIL NFR BLD: 3.7 % (ref 0–8)
ERYTHROCYTE [DISTWIDTH] IN BLOOD BY AUTOMATED COUNT: 13.5 % (ref 11.5–14.5)
EST. GFR  (NO RACE VARIABLE): >60 ML/MIN/1.73 M^2
ESTIMATED AVG GLUCOSE: 111 MG/DL (ref 68–131)
FERRITIN SERPL-MCNC: 48 NG/ML (ref 20–300)
GLUCOSE SERPL-MCNC: 94 MG/DL (ref 70–110)
HBA1C MFR BLD: 5.5 % (ref 4–5.6)
HCT VFR BLD AUTO: 40.3 % (ref 37–48.5)
HDLC SERPL-MCNC: 48 MG/DL (ref 40–75)
HDLC SERPL: 25.7 % (ref 20–50)
HGB BLD-MCNC: 13 G/DL (ref 12–16)
IMM GRANULOCYTES # BLD AUTO: 0.02 K/UL (ref 0–0.04)
IMM GRANULOCYTES NFR BLD AUTO: 0.3 % (ref 0–0.5)
IRON SERPL-MCNC: 35 UG/DL (ref 30–160)
LDLC SERPL CALC-MCNC: 115.6 MG/DL (ref 63–159)
LYMPHOCYTES # BLD AUTO: 1.7 K/UL (ref 1–4.8)
LYMPHOCYTES NFR BLD: 23.3 % (ref 18–48)
MCH RBC QN AUTO: 29.5 PG (ref 27–31)
MCHC RBC AUTO-ENTMCNC: 32.3 G/DL (ref 32–36)
MCV RBC AUTO: 91 FL (ref 82–98)
MONOCYTES # BLD AUTO: 0.5 K/UL (ref 0.3–1)
MONOCYTES NFR BLD: 6.7 % (ref 4–15)
NEUTROPHILS # BLD AUTO: 4.8 K/UL (ref 1.8–7.7)
NEUTROPHILS NFR BLD: 65.4 % (ref 38–73)
NONHDLC SERPL-MCNC: 139 MG/DL
NRBC BLD-RTO: 0 /100 WBC
PLATELET # BLD AUTO: 280 K/UL (ref 150–450)
PMV BLD AUTO: 9.5 FL (ref 9.2–12.9)
POTASSIUM SERPL-SCNC: 4.2 MMOL/L (ref 3.5–5.1)
PROT SERPL-MCNC: 6.8 G/DL (ref 6–8.4)
RBC # BLD AUTO: 4.41 M/UL (ref 4–5.4)
SATURATED IRON: 9 % (ref 20–50)
SODIUM SERPL-SCNC: 141 MMOL/L (ref 136–145)
TOTAL IRON BINDING CAPACITY: 383 UG/DL (ref 250–450)
TRANSFERRIN SERPL-MCNC: 259 MG/DL (ref 200–375)
TRIGL SERPL-MCNC: 117 MG/DL (ref 30–150)
TSH SERPL DL<=0.005 MIU/L-ACNC: 1.87 UIU/ML (ref 0.4–4)
VIT B12 SERPL-MCNC: 300 PG/ML (ref 210–950)
WBC # BLD AUTO: 7.26 K/UL (ref 3.9–12.7)

## 2024-09-28 PROCEDURE — 80053 COMPREHEN METABOLIC PANEL: CPT | Performed by: FAMILY MEDICINE

## 2024-09-28 PROCEDURE — 36415 COLL VENOUS BLD VENIPUNCTURE: CPT | Mod: PO | Performed by: FAMILY MEDICINE

## 2024-09-28 PROCEDURE — 82306 VITAMIN D 25 HYDROXY: CPT | Performed by: FAMILY MEDICINE

## 2024-09-28 PROCEDURE — 83540 ASSAY OF IRON: CPT | Performed by: FAMILY MEDICINE

## 2024-09-28 PROCEDURE — 82728 ASSAY OF FERRITIN: CPT | Performed by: FAMILY MEDICINE

## 2024-09-28 PROCEDURE — 82607 VITAMIN B-12: CPT | Performed by: FAMILY MEDICINE

## 2024-09-28 PROCEDURE — 80061 LIPID PANEL: CPT | Performed by: FAMILY MEDICINE

## 2024-09-28 PROCEDURE — 85025 COMPLETE CBC W/AUTO DIFF WBC: CPT | Performed by: FAMILY MEDICINE

## 2024-09-28 PROCEDURE — 84443 ASSAY THYROID STIM HORMONE: CPT | Performed by: FAMILY MEDICINE

## 2024-09-28 PROCEDURE — 83036 HEMOGLOBIN GLYCOSYLATED A1C: CPT | Performed by: FAMILY MEDICINE

## 2024-10-02 ENCOUNTER — PATIENT MESSAGE (OUTPATIENT)
Dept: ORTHOPEDICS | Facility: CLINIC | Age: 44
End: 2024-10-02
Payer: COMMERCIAL

## 2024-10-02 NOTE — PROGRESS NOTES
Pt asking if your able to send her something else in besides naproxen or Mobic for her ankle pain.     Pt was seen by you on 09/25 Mobic was prescribed for ankle pain, referral for ortho scheduled next available 10/31.

## 2024-10-08 ENCOUNTER — PATIENT MESSAGE (OUTPATIENT)
Dept: PRIMARY CARE CLINIC | Facility: CLINIC | Age: 44
End: 2024-10-08
Payer: COMMERCIAL

## 2024-10-08 DIAGNOSIS — M25.572 LEFT ANKLE PAIN, UNSPECIFIED CHRONICITY: Primary | ICD-10-CM

## 2024-10-09 NOTE — TELEPHONE ENCOUNTER
Lov 9/25/24  Xray was performed on 9/25/24 for left ankle pain following a fall. She is requesting external PT orders

## 2024-10-23 ENCOUNTER — OFFICE VISIT (OUTPATIENT)
Dept: ORTHOPEDICS | Facility: CLINIC | Age: 44
End: 2024-10-23
Payer: COMMERCIAL

## 2024-10-23 ENCOUNTER — PATIENT MESSAGE (OUTPATIENT)
Dept: ORTHOPEDICS | Facility: CLINIC | Age: 44
End: 2024-10-23

## 2024-10-23 ENCOUNTER — HOSPITAL ENCOUNTER (OUTPATIENT)
Dept: RADIOLOGY | Facility: OTHER | Age: 44
Discharge: HOME | End: 2024-10-23
Attending: ORTHOPAEDIC SURGERY
Payer: COMMERCIAL

## 2024-10-23 DIAGNOSIS — M65.4 TENOSYNOVITIS, DE QUERVAIN: ICD-10-CM

## 2024-10-23 DIAGNOSIS — M79.641 CHRONIC PAIN OF RIGHT HAND: ICD-10-CM

## 2024-10-23 DIAGNOSIS — G56.01 CARPAL TUNNEL SYNDROME OF RIGHT WRIST: ICD-10-CM

## 2024-10-23 DIAGNOSIS — M79.641 RIGHT HAND PAIN: Primary | ICD-10-CM

## 2024-10-23 DIAGNOSIS — G89.29 CHRONIC PAIN OF RIGHT HAND: ICD-10-CM

## 2024-10-23 DIAGNOSIS — Z12.31 OTHER SCREENING MAMMOGRAM: ICD-10-CM

## 2024-10-23 DIAGNOSIS — M79.641 RIGHT HAND PAIN: ICD-10-CM

## 2024-10-23 DIAGNOSIS — M18.11 PRIMARY OSTEOARTHRITIS OF FIRST CARPOMETACARPAL JOINT OF RIGHT HAND: Primary | ICD-10-CM

## 2024-10-23 DIAGNOSIS — G56.21 CUBITAL TUNNEL SYNDROME ON RIGHT: ICD-10-CM

## 2024-10-23 PROCEDURE — 3044F HG A1C LEVEL LT 7.0%: CPT | Mod: CPTII,S$GLB,, | Performed by: ORTHOPAEDIC SURGERY

## 2024-10-23 PROCEDURE — 1159F MED LIST DOCD IN RCRD: CPT | Mod: CPTII,S$GLB,, | Performed by: ORTHOPAEDIC SURGERY

## 2024-10-23 PROCEDURE — 1160F RVW MEDS BY RX/DR IN RCRD: CPT | Mod: CPTII,S$GLB,, | Performed by: ORTHOPAEDIC SURGERY

## 2024-10-23 PROCEDURE — 20600 DRAIN/INJ JOINT/BURSA W/O US: CPT | Mod: RT,S$GLB,, | Performed by: ORTHOPAEDIC SURGERY

## 2024-10-23 PROCEDURE — 99214 OFFICE O/P EST MOD 30 MIN: CPT | Mod: 25,S$GLB,, | Performed by: ORTHOPAEDIC SURGERY

## 2024-10-23 PROCEDURE — 99999 PR PBB SHADOW E&M-EST. PATIENT-LVL III: CPT | Mod: PBBFAC,,, | Performed by: ORTHOPAEDIC SURGERY

## 2024-10-23 PROCEDURE — 73130 X-RAY EXAM OF HAND: CPT | Mod: 26,RT,, | Performed by: RADIOLOGY

## 2024-10-23 PROCEDURE — 73130 X-RAY EXAM OF HAND: CPT | Mod: TC,FY,RT

## 2024-10-23 RX ADMIN — METHYLPREDNISOLONE ACETATE 40 MG: 40 INJECTION, SUSPENSION INTRA-ARTICULAR; INTRALESIONAL; INTRAMUSCULAR; SOFT TISSUE at 03:10

## 2024-10-23 NOTE — PROGRESS NOTES
Sarah Isabel presents for follow up evaluation of   Encounter Diagnoses   Name Primary?    Primary osteoarthritis of first carpometacarpal joint of right hand Yes    Carpal tunnel syndrome of right wrist     Cubital tunnel syndrome on right     Tenosynovitis, de Quervain     Chronic pain of right hand        History of Present Illness  The patient presents for evaluation of right hand pain.   Patient's main complaint today is right basilar thumb pain. Her last steroid injection was 5/8/2024, which provided good relief until it wore off. She states wearing the comfort cool brace during activity and at night help her pain.   She also had a right 1st dorsal extensor compartment at last visit on 8/12/24 and has not endorsing dequervains symptoms. All of her pain is localized to the CMC joint.     Patient has rescheduled her RUE EMG/NCS for December 18. She continues to endorse short lived shooting shock-like pains and tingling that radiates from right hand to elbow when reaching over to grab something. She states no numbness when painting.   Patient fell about 1 mo ago and sustained right ankle injury - she was prescribed naproxen and noticed it helps her thumb pain.     Vitals:    10/23/24 1512   PainSc:   7   PainLoc: Hand       PE:    AA&O x 4.  NAD  HEENT:  NCAT, sclera nonicteric  Lungs:  Respirations are equal and unlabored.  CV:  2+ bilateral upper and lower extremity pulses.  MSK:   Physical Exam      Positive median nerve compression test on right. Positive elbow flexion text on right. Positive Tinels at right carpal and cubital tunnel. + CMC grind. + circumduction test on right. + WHAT on right. Neurovascularly intact bilaterally.  5/5 thenar and intrinsic musculature strength.  Full range of motion hands, wrists and elbows.    Diagnostic studies and other clinical records review:  Results      Assessment/Plan:   Encounter Diagnoses   Name Primary?    Primary osteoarthritis of first carpometacarpal joint  of right hand Yes    Carpal tunnel syndrome of right wrist     Cubital tunnel syndrome on right     Tenosynovitis, de Quervain     Chronic pain of right hand        Assessment & Plan  The patient and I had a thorough discussion today. We discussed the working diagnosis as well as several other potential alternative diagnoses. Treatment options were discussed, both conservative and surgical. Conservative treatment options would include things such as activity modifications, workplace modifications, a period of rest, oral vs topical OTC and prescription anti-inflammatory medications, occupational therapy, splinting/bracing, immobilization, corticosteroid injections, and others. Surgical options were discussed as well.    Patient has rescheduled her EMG/NCS for December.     -I have offered her a selective injection. I have explained the risks, benefits, and alternatives of the procedure in detail.  The patient voices understanding and all questions have been answered. The patient agrees to proceed as planned. So after a sterile prep of the skin in the normal fashion the right thumb CMC joint injected using a 25 gauge needle with a combination of 1cc 1% plain lidocaine and 40 mg of methylprednisolone.  The patient is cautioned and immediate relief of pain is secondary to the local anesthetic and will be temporary.  After the anesthetic wears off there may be a increase in pain that may last for a few hours or a few days and they should use ice to help alleviate this flair up of pain. Patient tolerated the procedure well.    Virtual F/U after EMG/NCS  Call with any questions/concerns in the interim            Koki Melchor MD    Please be aware that this note has been generated with the assistance of IMANIN voice-to-text.  Please excuse any spelling or grammatical errors.    This note was generated with the assistance of ambient listening technology. Verbal consent was obtained by the patient and accompanying  visitor(s) for the recording of patient appointment to facilitate this note. I attest to having reviewed and edited the generated note for accuracy, though some syntax or spelling errors may persist. Please contact the author of this note for any clarification.

## 2024-10-24 RX ORDER — METHYLPREDNISOLONE ACETATE 40 MG/ML
40 INJECTION, SUSPENSION INTRA-ARTICULAR; INTRALESIONAL; INTRAMUSCULAR; SOFT TISSUE
Status: DISCONTINUED | OUTPATIENT
Start: 2024-10-23 | End: 2024-10-24 | Stop reason: HOSPADM

## 2024-10-25 NOTE — PROCEDURES
Small Joint Aspiration/Injection: R thumb CMC    Date/Time: 10/23/2024 3:15 PM    Performed by: Koki Melchor MD  Authorized by: Koki Melchor MD    Consent Done?:  Yes (Verbal)  Indications:  Pain  Timeout: prior to procedure the correct patient, procedure, and site was verified    Prep: patient was prepped and draped in usual sterile fashion      Local anesthesia used?: Yes    Anesthesia:  Local infiltration  Local anesthetic:  Lidocaine 1% without epinephrine  Location:  Thumb  Site:  R thumb CMC  Needle size:  25 G  Medications:  40 mg methylPREDNISolone acetate 40 mg/mL  Patient tolerance:  Patient tolerated the procedure well with no immediate complications

## 2024-10-29 ENCOUNTER — TELEPHONE (OUTPATIENT)
Dept: ORTHOPEDICS | Facility: CLINIC | Age: 44
End: 2024-10-29
Payer: COMMERCIAL

## 2024-11-04 ENCOUNTER — OFFICE VISIT (OUTPATIENT)
Dept: ORTHOPEDICS | Facility: CLINIC | Age: 44
End: 2024-11-04
Payer: COMMERCIAL

## 2024-11-04 DIAGNOSIS — M25.472 PAIN AND SWELLING OF LEFT ANKLE: ICD-10-CM

## 2024-11-04 DIAGNOSIS — M25.572 PAIN OF JOINT OF LEFT ANKLE AND FOOT: ICD-10-CM

## 2024-11-04 DIAGNOSIS — S86.302A INJURY OF PERONEAL TENDON OF LEFT FOOT, INITIAL ENCOUNTER: ICD-10-CM

## 2024-11-04 DIAGNOSIS — M79.605 LEG PAIN, LEFT: ICD-10-CM
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
## 2024-11-04 DIAGNOSIS — M25.572 PAIN AND SWELLING OF LEFT ANKLE: ICD-10-CM

## 2024-11-04 DIAGNOSIS — S93.492A SPRAIN OF ANTERIOR TALOFIBULAR LIGAMENT OF LEFT ANKLE, INITIAL ENCOUNTER: Primary | ICD-10-CM

## 2024-11-04 PROCEDURE — 1160F RVW MEDS BY RX/DR IN RCRD: CPT | Mod: CPTII,S$GLB,, | Performed by: ORTHOPAEDIC SURGERY

## 2024-11-04 PROCEDURE — 99203 OFFICE O/P NEW LOW 30 MIN: CPT | Mod: S$GLB,,, | Performed by: ORTHOPAEDIC SURGERY

## 2024-11-04 PROCEDURE — 1159F MED LIST DOCD IN RCRD: CPT | Mod: CPTII,S$GLB,, | Performed by: ORTHOPAEDIC SURGERY

## 2024-11-04 PROCEDURE — 3044F HG A1C LEVEL LT 7.0%: CPT | Mod: CPTII,S$GLB,, | Performed by: ORTHOPAEDIC SURGERY

## 2024-11-04 PROCEDURE — 99999 PR PBB SHADOW E&M-EST. PATIENT-LVL III: CPT | Mod: PBBFAC,,, | Performed by: ORTHOPAEDIC SURGERY

## 2024-11-04 NOTE — PROGRESS NOTES
DATE: 11/4/2024  PATIENT: Sarah Isabel    CHIEF COMPLAINT:  Left ankle pain    HISTORY:  Sarah Isabel is a 44 y.o. female here for evaluation of left ankle pain.  Patient states she had a trip and fall on 09/21/2024 where her foot inverted enrolled in.  She has had pain since that time mainly over the lateral aspect of the ankle.  She was placed into a tall boot.  She states the swelling and bruising has gone down significantly.  But she still has pain mainly with weight-bearing and walking.  She has been in the boot for 6 weeks without significant relief.  She is taking NSAIDs.  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     Morbid obesity with BMI of 45.0-49.9, adult     Thyroid nodule 05/29/2017     Past Surgical History:   Procedure Laterality Date    ANTERIOR CERVICAL DISCECTOMY W/ FUSION  2016    APPENDECTOMY      ARTHROSCOPY OF KNEE Right     ESOPHAGOGASTRODUODENOSCOPY      LAPAROSCOPIC APPENDECTOMY N/A 02/04/2019    Procedure: APPENDECTOMY, LAPAROSCOPIC;  Surgeon: Maikol Banks Jr., MD;  Location: [facility] OR;  Service: General;  Laterality: N/A;    Laparoscopic hh with Toupet      NASAL SEPTOPLASTY N/A 09/25/2020    Procedure: SEPTOPLASTY, NOSE;  Surgeon: Mike Alexander MD;  Location: [facility] OR [address] [place] [address];  Service: ENT;  Laterality: N/A;    NASAL TURBINATE REDUCTION Bilateral 09/25/2020    Procedure: REDUCTION, NASAL TURBINATE;  Surgeon: Mike Alexander MD;  Location: [facility] OR [address] [place] [address];  Service: ENT;  Laterality: Bilateral;    TONSILLECTOMY  2007    TUBAL LIGATION      UPPER GASTROINTESTINAL ENDOSCOPY      uterine ablation         Current Medications:   Current Outpatient Medications:     acetaZOLAMIDE (DIAMOX) 250 MG  tablet, TAKE 1 TABLET BY MOUTH ONCE DAILY, Disp: 90 tablet, Rfl: 3    buPROPion (WELLBUTRIN SR) 200 MG SR12, Take 1 tablet (200 mg total) by mouth once daily., Disp: 90 tablet, Rfl: 0    cholecalciferol, vitamin D3, (VITAMIN D3) 50 mcg (2,000 unit) Tab, Take 1 tablet (2,000 Units total) by mouth once daily., Disp: 90 each, Rfl: 3    lamoTRIgine (LAMICTAL) 150 MG Tab, Take 1 tablet (150 mg total) by mouth once daily., Disp: 90 tablet, Rfl: 0    LORazepam (ATIVAN) 0.5 MG tablet, Take 0.5 mg by mouth daily as needed., Disp: , Rfl:     meloxicam (MOBIC) 15 MG tablet, Take 1 tablet (15 mg total) by mouth once daily., Disp: 30 tablet, Rfl: 0    naproxen (NAPROSYN) 500 MG tablet, Take 1 tablet (500 mg total) by mouth 2 (two) times daily as needed., Disp: 60 tablet, Rfl: 0    rizatriptan (MAXALT) 10 MG tablet, TAKE 1 TABLET BY MOUTH AS NEEDED FOR MIGRAINE, Disp: 12 tablet, Rfl: 3    traMADoL (ULTRAM) 50 mg tablet, Take 1 tablet (50 mg total) by mouth every 8 (eight) hours as needed for Pain., Disp: 90 tablet, Rfl: 3    TRINTELLIX 10 mg Tab, Take 1 tablet (10 mg total) by mouth once daily., Disp: 90 tablet, Rfl: 0    Social History:   Social History     Socioeconomic History    Marital status:     Number of children: 1   Occupational History    Occupation: HR supervisor   Tobacco Use    Smoking status: Former     Current packs/day: 0.00     Average packs/day: 0.3 packs/day for 10.0 years (2.5 ttl pk-yrs)     Types: Cigarettes     Start date: 2002     Quit date: 2012     Years since quittin.7     Passive exposure: Never    Smokeless tobacco: Never    Tobacco comments:     quit smoking    Substance and Sexual Activity    Alcohol use: Not Currently    Drug use: No    Sexual activity: Not Currently     Partners: Male   Social History Narrative    18 y/o daughter at [employer], senior        Works for human resources     Social Drivers of Health     Financial Resource Strain: Low Risk  (2024)     Overall Financial Resource Strain (CARDIA)     Difficulty of Paying Living Expenses: Not hard at all   Food Insecurity: No Food Insecurity (4/8/2024)    Hunger Vital Sign     Worried About Running Out of Food in the Last Year: Never true     Ran Out of Food in the Last Year: Never true   Transportation Needs: No Transportation Needs (4/8/2024)    PRAPARE - Transportation     Lack of Transportation (Medical): No     Lack of Transportation (Non-Medical): No   Physical Activity: Insufficiently Active (4/8/2024)    Exercise Vital Sign     Days of Exercise per Week: 2 days     Minutes of Exercise per Session: 20 min   Stress: No Stress Concern Present (4/8/2024)    [employer] - Occupational Stress Questionnaire     Feeling of Stress : Only a little   Housing Stability: Low Risk  (4/8/2024)    Housing Stability Vital Sign     Unable to Pay for Housing in the Last Year: No     Number of Places Lived in the Last Year: 1     Unstable Housing in the Last Year: No       REVIEW OF SYSTEMS:  Constitution: Negative. Negative for chills, fever and night sweats.   Cardiovascular: Negative for chest pain and syncope.   Respiratory: Negative for cough and shortness of breath.   Gastrointestinal: See HPI. Negative for nausea/vomiting. Negative for abdominal pain.  Genitourinary: See HPI. Negative for discoloration or dysuria.  Skin: Negative for dry skin, itching and rash.   Hematologic/Lymphatic: Negative for bleeding problem. Does not bruise/bleed easily.   Musculoskeletal: Negative for falls and muscle weakness.   Neurological: See HPI. No seizures.   Endocrine: Negative for polydipsia, polyphagia and polyuria.   Allergic/Immunologic: Negative for hives and persistent infections.    PHYSICAL EXAMINATION:    There were no vitals taken for this visit.    General: The patient is a 44 y.o. female in no apparent distress, the patient is oriented to person, place and time.   Psych: Normal mood and  affect  HEENT:  NCAT, sclera nonicteric  Lungs:  Respirations are equal and unlabored.  CV:  2+ bilateral upper and lower extremity pulses.  Skin:  Intact throughout.  Musculoskeletal: No pain with the range of motion of the bilateral hips. No trochanteric tenderness to palpation. No pain with range of motion about the bilateral knees.      Left Foot and Ankle Exam    INSPECTION:        Gait:    Normal    Scars:   None   Swelling:  None   Color:   Normal   Atrophy:  None  Heel / Toe Walking: No difficulty    ALIGNMENT:    Hindfoot  Normal    Midfoot: Normal  Forefoot: Normal     Collective Ankle-Hindfoot Alignment    Good -plantigrade (PG), well aligned  [Fair-PG, malaligned, asymptomatic]  [Poor-Non-PG,malaligned, has sxs]       TENDERNESS:  LATERAL:      Sinus tarsi:  None    Syndesmosis:  none    ATFL:   TTP     CFL:   none    Anterolateral gutter: none    Fibula:   none  Peroneal tendons: TTP    Peroneal tubercle.  None     ANTERIOR:  Anteromedial joint line:  none  Anterolateral joint line:  None  Talonavicular:    None  Anterior tibialis:   none  Extensor tendons:   none    POSTERIOR:  Medial/lateral achilles:   none  Medial/lateral achilles insertion: None    MEDIAL:      Deltoid:  none    Malleolus:  none    PTT:   none    Navicular:  none           CALCANEUS:  Retrocalcaneal:   none  Medial achilles:   None  Lateral achilles:   None  Calcaneal tuberosity:   None    FOOT:    Calcaneal cuboid  none   MT / MT heads:  none   Navicular   none    Medial cord origin PF:  none  Cuneiforms:   none    Web space:   none  Lisfranc    none    Tarsal tunnel:   none  Base of the fifth metatarsal  none   Tinels sign   neg        RANGE OF MOTION:  RIGHT/ LEFT      Ankle DF/PF:  15/45  15/45         Eversion/Inversion: 15/25 15*/25*     Midfoot ABD/ADD: 10/10 10/10     First MTP DF/PF: 60/25 60/25              (* = pain)                  STRENGTH: (affected)  Anterior tibialis: 5/5  Posterior  tibialis: 5/5  Gastroc-soleus: 5/5  Peroneals:  5*/5  EHL:   5/5  FHL:   5/5    (* = pain)    SPECIAL TESTS:   ANKLE INSTABILITY: (*pain)    Anterior drawer:   Normal      (C-W contralateral side)     Inversion:   30°     Eversion  10°            Collective Instability: (Ant-post and varus-valgus)     Stable        PROVOCATIVE TESTING:    Forced DF/ER: No pain at syndesmosis.    Mid-leg squeeze  No pain at syndesmosis    Forced DF:  No pain anterior joint line.      Forced PF:  No pain posterior ankle.     Forced INV:  Pain lateral    Forced EV:  No pain medial     Gaminos sign: Normal ankle plantar flexion.     Resisted peroneal No subluxation or pain    1st-2nd MT toggle No pain at Lisfranc    MT-T torque  No pain at Lisfranc     NEUROLOGIC TESTING:  All dermatomes foot, ankle and leg have normal sensation light touch  Ankle Reflexes 2+, symmetric   Negative Babinski and No Clonus    VASCULAR:  2+ pulses PT/DT with brisk capillary refill toes.        IMAGING:     Radiographs of the left ankle were personally reviewed with the patient today from 09/21/2024:  Nonweightbearing x-ray of the left ankle no acute fractures or dislocations, no osseous abnormalities, talar dome is symmetric, ankle mortise is intact        ASSESSMENT/PLAN:    Sarah was seen today for pain.    Diagnoses and all orders for this visit:    Sprain of anterior talofibular ligament of left ankle, initial encounter  -     MRI Ankle Without Contrast Left; Future    Possible Injury of peroneal tendon of left foot, initial encounter  -     MRI Ankle Without Contrast Left; Future    Pain of joint of left ankle and foot  -     MRI Ankle Without Contrast Left; Future        44 y.o. yo female with left ankle ATFL sprain, possible peroneal tendon injury    Plan: The patient and I had a thorough discussion today.  We discussed the working diagnosis as well as several other potential alternative diagnoses.  Treatment options were discussed, both  conservative and surgical.  Conservative treatment options would include things such as activity modifications, a period of rest, tylenol, anti-inflammatory medications, physical therapy, immobilization, corticosteroid injections, and others.     At this time, the patient would like to proceed with MRI of the left ankle given it has been 6 weeks and she is not improved significantly over this time despite protected weight-bearing in a boot.  If MRI is negative for peroneal dysfunction, we will begin formal physical therapy.    Return to clinic after MRI.    I have personally taken the history and examined this patient and agree with the residents note as stated above.

## 2024-11-10 ENCOUNTER — HOSPITAL ENCOUNTER (OUTPATIENT)
Dept: RADIOLOGY | Facility: HOSPITAL | Age: 44
Discharge: HOME | End: 2024-11-10
Attending: ORTHOPAEDIC SURGERY
Payer: COMMERCIAL

## 2024-11-10 DIAGNOSIS — M25.572 PAIN OF JOINT OF LEFT ANKLE AND FOOT: ICD-10-CM

## 2024-11-10 DIAGNOSIS — S86.302A INJURY OF PERONEAL TENDON OF LEFT FOOT, INITIAL ENCOUNTER: ICD-10-CM

## 2024-11-10 DIAGNOSIS — S93.492A SPRAIN OF ANTERIOR TALOFIBULAR LIGAMENT OF LEFT ANKLE, INITIAL ENCOUNTER: ICD-10-CM

## 2024-11-10 PROCEDURE — 73721 MRI JNT OF LWR EXTRE W/O DYE: CPT | Mod: 26,LT,, | Performed by: RADIOLOGY

## 2024-11-10 PROCEDURE — 73721 MRI JNT OF LWR EXTRE W/O DYE: CPT | Mod: TC,LT

## 2024-11-11 ENCOUNTER — PATIENT MESSAGE (OUTPATIENT)
Dept: ORTHOPEDICS | Facility: CLINIC | Age: 44
End: 2024-11-11
Payer: COMMERCIAL

## 2024-11-15 ENCOUNTER — OFFICE VISIT (OUTPATIENT)
Dept: ORTHOPEDICS | Facility: CLINIC | Age: 44
End: 2024-11-15
Payer: COMMERCIAL

## 2024-11-15 DIAGNOSIS — S93.492D SPRAIN OF ANTERIOR TALOFIBULAR LIGAMENT OF LEFT ANKLE, SUBSEQUENT ENCOUNTER: Primary | ICD-10-CM

## 2024-11-15 DIAGNOSIS — S86.302D INJURY OF PERONEAL TENDON OF LEFT FOOT, SUBSEQUENT ENCOUNTER: ICD-10-CM

## 2024-11-15 NOTE — PROGRESS NOTES
Established Patient - Audio Only Telehealth Visit     The patient location is:  [place]  The chief complaint leading to consultation is:  MRI results  Visit type: Virtual visit with audio only (telephone)  Total time spent with patient:  5 minute       The reason for the audio only service rather than synchronous audio and video virtual visit was related to technical difficulties or patient preference/necessity.     Each patient to whom I provide medical services by telemedicine is:  (1) informed of the relationship between the physician and patient and the respective role of any other health care provider with respect to management of the patient; and (2) notified that they may decline to receive medical services by telemedicine and may withdraw from such care at any time. Patient verbally consented to receive this service via voice-only telephone call.       HPI:  This is a 44-year-old female who presented to me on 11/04/2024 for continued left ankle pain that occurred after a trip and fall on 09/21/2024.  She was initially placed in a boot prior to seeing me and reported that her swelling and bruising had significantly improved but she was still having pain with weight-bearing activities and did not feel like she was really improved from a pain and functional standpoint.  I felt she may have had an injury of the peroneal tendons so I ordered an MRI.      MRI results: MRI of the left ankle performed on 11/10/2024 reveals mild tendinosis of the peroneus brevis tendon as well as some fluid around the posterior tibial tendon.  Sprains of the anterolateral ligaments are noted as well.  There are no intra-articular injuries.      Assessment and plan:    1. Sprain of anterior talofibular ligament of left ankle, subsequent encounter        2. Injury of peroneal tendon of left foot, subsequent encounter          Recommendation:  Results of the MRI were discussed.  I explained to her that she did not have any structural  abnormalities requiring any urgent surgical intervention.  I recommended giving this further time.  I instructed her on some home exercise programs to help strengthen the peroneal tendons as well as proprioception balancing exercises.  I suggested she come out of the boot into a brace and regular shoes.    Follow-up in six weeks                        This service was not originating from a related E/M service provided within the previous 7 days nor will  to an E/M service or procedure within the next 24 hours or my soonest available appointment.  Prevailing standard of care was able to be met in this audio-only visit.

## 2024-11-26 ENCOUNTER — PATIENT MESSAGE (OUTPATIENT)
Dept: ORTHOPEDICS | Facility: CLINIC | Age: 44
End: 2024-11-26
Payer: COMMERCIAL

## 2025-01-07 DIAGNOSIS — M79.642 LEFT HAND PAIN: Primary | ICD-10-CM

## 2025-01-19 ENCOUNTER — PATIENT MESSAGE (OUTPATIENT)
Dept: ORTHOPEDICS | Facility: CLINIC | Age: 45
End: 2025-01-19
Payer: COMMERCIAL

## 2025-01-24 ENCOUNTER — PATIENT MESSAGE (OUTPATIENT)
Dept: NEUROLOGY | Facility: CLINIC | Age: 45
End: 2025-01-24
Payer: COMMERCIAL

## 2025-01-25 ENCOUNTER — PATIENT MESSAGE (OUTPATIENT)
Dept: ORTHOPEDICS | Facility: CLINIC | Age: 45
End: 2025-01-25
Payer: COMMERCIAL

## 2025-01-27 ENCOUNTER — OFFICE VISIT (OUTPATIENT)
Dept: ORTHOPEDICS | Facility: CLINIC | Age: 45
End: 2025-01-27
Payer: COMMERCIAL

## 2025-01-27 ENCOUNTER — HOSPITAL ENCOUNTER (OUTPATIENT)
Dept: RADIOLOGY | Facility: HOSPITAL | Age: 45
Discharge: HOME | End: 2025-01-27
Attending: ORTHOPAEDIC SURGERY
Payer: COMMERCIAL

## 2025-01-27 DIAGNOSIS — G56.23 CUBITAL TUNNEL SYNDROME, BILATERAL: ICD-10-CM

## 2025-01-27 DIAGNOSIS — M15.1 OSTEOARTHRITIS OF DISTAL INTERPHALANGEAL (DIP) JOINT OF LEFT INDEX FINGER: ICD-10-CM

## 2025-01-27 DIAGNOSIS — M18.11 PRIMARY OSTEOARTHRITIS OF FIRST CARPOMETACARPAL JOINT OF RIGHT HAND: Primary | ICD-10-CM

## 2025-01-27 DIAGNOSIS — M79.642 LEFT HAND PAIN: ICD-10-CM

## 2025-01-27 PROCEDURE — 99214 OFFICE O/P EST MOD 30 MIN: CPT | Mod: 25,S$GLB,, | Performed by: ORTHOPAEDIC SURGERY

## 2025-01-27 PROCEDURE — 20600 DRAIN/INJ JOINT/BURSA W/O US: CPT | Mod: 50,S$GLB,, | Performed by: ORTHOPAEDIC SURGERY

## 2025-01-27 PROCEDURE — 1160F RVW MEDS BY RX/DR IN RCRD: CPT | Mod: CPTII,S$GLB,, | Performed by: ORTHOPAEDIC SURGERY

## 2025-01-27 PROCEDURE — 73130 X-RAY EXAM OF HAND: CPT | Mod: 26,LT,, | Performed by: RADIOLOGY

## 2025-01-27 PROCEDURE — 1159F MED LIST DOCD IN RCRD: CPT | Mod: CPTII,S$GLB,, | Performed by: ORTHOPAEDIC SURGERY

## 2025-01-27 PROCEDURE — 99999 PR PBB SHADOW E&M-EST. PATIENT-LVL II: CPT | Mod: PBBFAC,,, | Performed by: ORTHOPAEDIC SURGERY

## 2025-01-27 PROCEDURE — 73130 X-RAY EXAM OF HAND: CPT | Mod: TC,LT

## 2025-01-27 RX ADMIN — METHYLPREDNISOLONE ACETATE 40 MG: 40 INJECTION, SUSPENSION INTRA-ARTICULAR; INTRALESIONAL; INTRAMUSCULAR; SOFT TISSUE at 02:01

## 2025-01-27 NOTE — PROGRESS NOTES
Sarah Isabel presents for follow up evaluation of   Encounter Diagnoses   Name Primary?    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  She denies any numbness currently.  She states that she has been having pain in the right thumb CMC joint as well as the left index DIP joint.    The right thumb pain has been longstanding, previous steroid injections have given her good relief. The pain in the left index finger has been for the past month.    Vitals:    01/27/25 1457   PainSc:   3   PainLoc: Hand       PE:    AA&O x 4.  NAD  HEENT:  NCAT, sclera nonicteric  Lungs:  Respirations are equal and unlabored.  CV:  2+ bilateral upper and lower extremity pulses.  MSK:   Positive grind test right thumb CMC joint, tender to palpation left index DIP joint decreased range of motion, positive compression Tinel's and Phalens bilateral wrists.  Neurovascularly intact bilaterally.  5/5 thenar and intrinsic musculature strength.  Full range of motion hands, wrists and elbows.      Diagnostic studies and other clinical records review:  X-rays AP, lateral and oblique left hand taken today are independently reviewed by me and shows mild arthritis left index DIP and Eaton stage II basilar thumb arthritis.     Assessment/Plan:   Encounter Diagnoses   Name Primary?    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 We discussed the working diagnosis as well as several other potential alternative diagnoses. Treatment options were  discussed, both conservative and surgical. Conservative treatment options would include things such as activity modifications, workplace modifications, a period of rest, oral vs topical OTC and prescription anti-inflammatory medications, occupational therapy, splinting/bracing, immobilization, corticosteroid injections, and others. Surgical options were discussed as well.    -I have offered her a selective injection. I have explained the risks, benefits, and alternatives of the procedure in detail.  The patient voices understanding and all questions have been answered. The patient agrees to proceed as planned. So after a sterile prep of the skin in the normal fashion the Left index DIP joint and right thumb cmc joint injected using a 25 gauge needle with a combination of 1cc 1% plain lidocaine and 40 mg of methylprednisolone.  The patient is cautioned and immediate relief of pain is secondary to the local anesthetic and will be temporary.  After the anesthetic wears off there may be a increase in pain that may last for a few hours or a few days and they should use ice to help alleviate this flair up of pain. Patient tolerated the procedure well.    Will call in 2 weeks to follow up for steroid injection efficacy or sooner for any worsening of symptoms  F/U after EMG/NCS for results review with new xrays right hand  Call with any questions/concerns in the interim         Koki Melchor MD    Please be aware that this note has been generated with the assistance of [employer] voice-to-text.  Please excuse any spelling or grammatical errors.  This note was generated with the assistance of ambient listening technology. Verbal consent was obtained by the patient and accompanying visitor(s) for the recording of patient appointment to facilitate this note. I attest to having reviewed and edited the generated note for accuracy, though some syntax or spelling errors may persist. Please contact the author of this note for any  clarification.

## 2025-01-27 NOTE — PROCEDURES
Small Joint Aspiration/Injection: R thumb CMC    Date/Time: 1/27/2025 2:45 PM    Performed by: Koki Melchor MD  Authorized by: Koki Melchor MD    Consent Done?:  Yes (Verbal)  Indications:  Pain  Timeout: prior to procedure the correct patient, procedure, and site was verified    Prep: patient was prepped and draped in usual sterile fashion      Local anesthesia used?: Yes    Anesthesia:  Local infiltration  Local anesthetic:  Lidocaine 1% without epinephrine  Location:  Thumb  Site:  R thumb CMC  Needle size:  25 G  Medications:  40 mg methylPREDNISolone acetate 40 mg/mL  Patient tolerance:  Patient tolerated the procedure well with no immediate complications  Small Joint Aspiration/Injection: L index DIP    Date/Time: 1/27/2025 2:45 PM    Performed by: Koki Melchor MD  Authorized by: Koki Melchor MD    Consent Done?:  Yes (Verbal)  Indications:  Pain  Timeout: prior to procedure the correct patient, procedure, and site was verified    Prep: patient was prepped and draped in usual sterile fashion      Local anesthesia used?: Yes    Anesthesia:  Local infiltration  Local anesthetic:  Lidocaine 1% without epinephrine  Location:  Index finger  Site:  L index DIP  Needle size:  25 G  Medications:  40 mg methylPREDNISolone acetate 40 mg/mL  Patient tolerance:  Patient tolerated the procedure well with no immediate complications

## 2025-01-28 ENCOUNTER — OFFICE VISIT (OUTPATIENT)
Dept: NEUROLOGY | Facility: CLINIC | Age: 45
End: 2025-01-28
Payer: COMMERCIAL

## 2025-01-28 DIAGNOSIS — G43.009 MIGRAINE WITHOUT AURA AND WITHOUT STATUS MIGRAINOSUS, NOT INTRACTABLE: ICD-10-CM

## 2025-01-28 DIAGNOSIS — E66.01 CLASS 3 SEVERE OBESITY DUE TO EXCESS CALORIES WITH SERIOUS COMORBIDITY AND BODY MASS INDEX (BMI) OF 40.0 TO 44.9 IN ADULT: ICD-10-CM

## 2025-01-28 DIAGNOSIS — E66.813 CLASS 3 SEVERE OBESITY DUE TO EXCESS CALORIES WITH SERIOUS COMORBIDITY AND BODY MASS INDEX (BMI) OF 40.0 TO 44.9 IN ADULT: ICD-10-CM

## 2025-01-28 PROCEDURE — 98005 SYNCH AUDIO-VIDEO EST LOW 20: CPT | Mod: 95,,, | Performed by: PSYCHIATRY & NEUROLOGY

## 2025-01-28 RX ORDER — ACETAZOLAMIDE 250 MG/1
250 TABLET ORAL DAILY
Qty: 90 TABLET | Refills: 3 | Status: SHIPPED | OUTPATIENT
Start: 2025-01-28

## 2025-01-28 RX ORDER — RIZATRIPTAN BENZOATE 10 MG/1
10 TABLET ORAL ONCE AS NEEDED
Qty: 12 TABLET | Refills: 3 | Status: SHIPPED | OUTPATIENT
Start: 2025-01-28 | End: 2025-01-28

## 2025-01-28 NOTE — PROGRESS NOTES
[facility] - NEUROLOGY 7TH FL [facility], [place]    Date: 1/28/25  Patient Name: Sarah Isabel   MRN: [id number]   PCP: Nini Pryor  Referring Provider: No ref. provider found    Assessment:   Sarah Isabel is a 44 y.o. female presenting in follow-up for management of IIH and migraine.  Currently well-controlled. Continuing Diamox and Maxalt.  All questions answered.  Plan:     Problem List Items Addressed This Visit          Neuro    Migraine without aura and without status migrainosus, not intractable    Relevant Medications    rizatriptan (MAXALT) 10 MG tablet    acetaZOLAMIDE (DIAMOX) 250 MG tablet       Endocrine    Class 3 severe obesity due to excess calories with serious comorbidity and body mass index (BMI) of 40.0 to 44.9 in adult       Harris Espinosa MD  [facility]   Department of Neurology    Patient note was created using MModal Dictation.  Any errors in syntax or even information may not have been identified and edited on initial review prior to signing this note.  Subjective:     HPI:   Ms. Sarah Isabel is a 44 y.o. female With history of IIH and migraine.  Patient reports she is currently doing well with only occasional breakthrough headaches a few times per month typically around her period.  She states they typically respond rapidly to Maxalt.  She denies any postural changes with her headache or vision changes.  Denies any side effects.  Has no other complaints today.  Continues to work on weight loss.      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     Morbid obesity with BMI of 45.0-49.9, adult     Thyroid nodule 05/29/2017       PAST SURGICAL  HISTORY:  Past Surgical History:   Procedure Laterality Date    ANTERIOR CERVICAL DISCECTOMY W/ FUSION  2016    APPENDECTOMY      ARTHROSCOPY OF KNEE Right     ESOPHAGOGASTRODUODENOSCOPY      LAPAROSCOPIC APPENDECTOMY N/A 02/04/2019    Procedure: APPENDECTOMY, LAPAROSCOPIC;  Surgeon: Maikol Banks Jr., MD;  Location: [facility];  Service: General;  Laterality: N/A;    Laparoscopic hh with Toupet      NASAL SEPTOPLASTY N/A 09/25/2020    Procedure: SEPTOPLASTY, NOSE;  Surgeon: Mike Alexander MD;  Location: [facility] OR [address] [place] [address];  Service: ENT;  Laterality: N/A;    NASAL TURBINATE REDUCTION Bilateral 09/25/2020    Procedure: REDUCTION, NASAL TURBINATE;  Surgeon: Mike Alexander MD;  Location: [facility] OR [address] [place] [address];  Service: ENT;  Laterality: Bilateral;    TONSILLECTOMY  2007    TUBAL LIGATION      UPPER GASTROINTESTINAL ENDOSCOPY      uterine ablation         CURRENT MEDS:  Current Outpatient Medications   Medication Sig Dispense Refill    acetaZOLAMIDE (DIAMOX) 250 MG tablet Take 1 tablet (250 mg total) by mouth once daily. 90 tablet 3    buPROPion (WELLBUTRIN SR) 200 MG SR12 Take 1 tablet (200 mg total) by mouth once daily. 90 tablet 0    cholecalciferol, vitamin D3, (VITAMIN D3) 50 mcg (2,000 unit) Tab Take 1 tablet (2,000 Units total) by mouth once daily. 90 each 3    lamoTRIgine (LAMICTAL) 150 MG Tab Take 1 tablet (150 mg total) by mouth once daily. 90 tablet 0    LORazepam (ATIVAN) 0.5 MG tablet Take 0.5 mg by mouth daily as needed.      rizatriptan (MAXALT) 10 MG tablet Take 1 tablet (10 mg total) by mouth once as needed for Migraine (May repeat 1x after 2 hours if no symptom relief). 12 tablet 3     No current facility-administered medications for this visit.       ALLERGIES:  Review of patient's allergies indicates:   Allergen Reactions    Percocet [oxycodone-acetaminophen] Nausea And Vomiting    Sulfa (sulfonamide antibiotics) Rash    Sulfadiazine     Phentermine Rash       FAMILY HISTORY:  Family History    Problem Relation Name Age of Onset    Hypertension Father      Diabetes Paternal Grandfather      Asthma Maternal Uncle      Asthma Cousin      Cancer Neg Hx      Heart disease Neg Hx      Stroke Neg Hx         SOCIAL HISTORY:  Social History     Tobacco Use    Smoking status: Former     Current packs/day: 0.00     Average packs/day: 0.3 packs/day for 10.0 years (2.5 ttl pk-yrs)     Types: Cigarettes     Start date: 2002     Quit date: 2012     Years since quittin.9     Passive exposure: Never    Smokeless tobacco: Never    Tobacco comments:     quit smoking    Substance Use Topics    Alcohol use: Not Currently    Drug use: No       Review of Systems:  12 system review of systems is negative except for the symptoms mentioned in HPI.      Objective:     General: NAD, well nourished   Eyes: no tearing, discharge, no erythema   ENT: moist mucous membranes of the oral cavity, nares patent    Neck: Supple, full range of motion  Cardiovascular: Appears well perfused  Lungs: Normal work of breathing, normal chest wall excursions  Skin: No rash, lesions, or breakdown on exposed skin  Psychiatry: Mood and affect are appropriate   Abdomen: nondistended, non tender  Extremeties: No cyanosis, clubbing or edema visible    Neurological   MENTAL STATUS: Alert and oriented to person, place, and time. Attention and concentration within normal limits. Speech without dysarthria, able to name and repeat without difficulty. Recent and remote memory within normal limits   CRANIAL NERVES: Visual fields intact. PERRL. EOMI. Facial sensation intact. Face symmetrical. Hearing grossly intact. Full shoulder shrug bilaterally. Tongue protrudes midline   SENSORY: Sensation is intact to light touch throughout.   MOTOR: Normal bulk No pronator drift.  Moves all extremities symmetrically.  CEREBELLAR/COORDINATION/GAIT: Gait steady.

## 2025-02-14 ENCOUNTER — TELEPHONE (OUTPATIENT)
Dept: ORTHOPEDICS | Facility: CLINIC | Age: 45
End: 2025-02-14
Payer: COMMERCIAL

## 2025-02-14 NOTE — TELEPHONE ENCOUNTER
R. thumb cmc 1/27/25 100% Improvement continued.   L. IF DIPJ 1/27/25 100% Improvement continued.     Emeka Waters MS, OTC   Sports Medicine Assistant   [facility]  (P) [phone]  (F) [phone]

## 2025-02-23 RX ORDER — METHYLPREDNISOLONE ACETATE 40 MG/ML
40 INJECTION, SUSPENSION INTRA-ARTICULAR; INTRALESIONAL; INTRAMUSCULAR; SOFT TISSUE
Status: DISCONTINUED | OUTPATIENT
Start: 2025-01-27 | End: 2025-02-23 | Stop reason: HOSPADM

## 2025-02-28 ENCOUNTER — PATIENT MESSAGE (OUTPATIENT)
Dept: PRIMARY CARE CLINIC | Facility: CLINIC | Age: 45
End: 2025-02-28
Payer: COMMERCIAL

## 2025-04-03 ENCOUNTER — TELEPHONE (OUTPATIENT)
Dept: ORTHOPEDICS | Facility: CLINIC | Age: 45
End: 2025-04-03
Payer: COMMERCIAL

## 2025-04-24 ENCOUNTER — TELEPHONE (OUTPATIENT)
Dept: ORTHOPEDICS | Facility: CLINIC | Age: 45
End: 2025-04-24

## 2025-04-24 DIAGNOSIS — M54.12 CERVICAL RADICULOPATHY: ICD-10-CM

## 2025-04-24 DIAGNOSIS — G56.21 CUBITAL TUNNEL SYNDROME ON RIGHT: ICD-10-CM

## 2025-04-24 DIAGNOSIS — G56.01 CARPAL TUNNEL SYNDROME OF RIGHT WRIST: Primary | ICD-10-CM

## 2025-04-24 NOTE — TELEPHONE ENCOUNTER
----- Message from Patty Hendrickson sent at 4/21/2025  1:29 PM CDT -----    ----- Message -----  From: Lori Espinosa  Sent: 4/21/2025  12:25 PM CDT  To: [facility] Staff    Type:  Needs Medical AdviceWho Called: PtWould the patient rather a call back or a response via [facility]? callBest Call Back Number:  [phone] Information: Pt is requesting A new order be placed for EMG due to EMG DEPT stated she need one before scheduling

## 2025-05-05 ENCOUNTER — TELEPHONE (OUTPATIENT)
Dept: ORTHOPEDICS | Facility: CLINIC | Age: 45
End: 2025-05-05
Payer: COMMERCIAL

## 2025-05-16 ENCOUNTER — PATIENT MESSAGE (OUTPATIENT)
Dept: PRIMARY CARE CLINIC | Facility: CLINIC | Age: 45
End: 2025-05-16
Payer: COMMERCIAL

## 2025-05-27 ENCOUNTER — OFFICE VISIT (OUTPATIENT)
Dept: NEUROLOGY | Facility: CLINIC | Age: 45
End: 2025-05-27
Payer: COMMERCIAL

## 2025-05-27 DIAGNOSIS — G43.009 MIGRAINE WITHOUT AURA AND WITHOUT STATUS MIGRAINOSUS, NOT INTRACTABLE: ICD-10-CM

## 2025-05-27 PROCEDURE — 98005 SYNCH AUDIO-VIDEO EST LOW 20: CPT | Mod: 95,,, | Performed by: PSYCHIATRY & NEUROLOGY

## 2025-05-27 RX ORDER — ACETAZOLAMIDE 250 MG/1
250 TABLET ORAL DAILY
Qty: 90 TABLET | Refills: 3 | Status: SHIPPED | OUTPATIENT
Start: 2025-05-27

## 2025-05-27 RX ORDER — RIZATRIPTAN BENZOATE 10 MG/1
10 TABLET ORAL ONCE AS NEEDED
Qty: 12 TABLET | Refills: 3 | Status: SHIPPED | OUTPATIENT
Start: 2025-05-27 | End: 2025-05-27

## 2025-05-27 NOTE — PROGRESS NOTES
The patient location is: [place]  The chief complaint leading to consultation is: Migraine and IIH    Visit type: audiovisual    Face to Face time with patient: 10 minutes  15 minutes of total time spent on the encounter, which includes face to face time and non-face to face time preparing to see the patient (eg, review of tests), Obtaining and/or reviewing separately obtained history, Documenting clinical information in the electronic or other health record, Independently interpreting results (not separately reported) and communicating results to the patient/family/caregiver, or Care coordination (not separately reported).       [facility] - NEUROLOGY [address]TH [address] [facility], [place]    Date: 5/27/25  Patient Name: Sarah Isabel   MRN: [id number]   PCP: Nini Pryor  Referring Provider: No ref. provider found    Assessment:   Sarah Isabel is a 44 y.o. female presenting in follow-up for management of IIH and migraine.  Currently well-controlled. Continuing Diamox and Maxalt with no changes. Patient continues to follow with yearly eye exam.   Plan:     Problem List Items Addressed This Visit          Neuro    Migraine without aura and without status migrainosus, not intractable    Relevant Medications    rizatriptan (MAXALT) 10 MG tablet    acetaZOLAMIDE (DIAMOX) 250 MG tablet       Harris Espinosa MD  [facility]   Department of Neurology    Patient note was created using MModal Dictation.  Any errors in syntax or even information may not have been identified and edited on initial review prior to signing this note.  Subjective:     HPI:   Ms. Sarah Isabel is a 44 y.o. female With history of IIH and migraine.  Patient reports good control of her headaches with breakthrough headaches occurring 2-3 times per month that rapidly respond to Maxalt.  She reports she is tolerating Diamox with no side effects and denies vision changes.  She has kept up with yearly eye  exams.  She denies positional headaches.  She has no other complaints today.    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     Morbid obesity with BMI of 45.0-49.9, adult     Thyroid nodule 05/29/2017       PAST SURGICAL HISTORY:  Past Surgical History:   Procedure Laterality Date    ANTERIOR CERVICAL DISCECTOMY W/ FUSION  2016    APPENDECTOMY      ARTHROSCOPY OF KNEE Right     ESOPHAGOGASTRODUODENOSCOPY      LAPAROSCOPIC APPENDECTOMY N/A 02/04/2019    Procedure: APPENDECTOMY, LAPAROSCOPIC;  Surgeon: Maikol Banks Jr., MD;  Location: [facility] OR;  Service: General;  Laterality: N/A;    Laparoscopic hh with Toupet      NASAL SEPTOPLASTY N/A 09/25/2020    Procedure: SEPTOPLASTY, NOSE;  Surgeon: Mike Alexander MD;  [facility];  Service: ENT;  Laterality: N/A;    NASAL TURBINATE REDUCTION Bilateral 09/25/2020    Procedure: REDUCTION, NASAL TURBINATE;  Surgeon: Mike Alexander MD;  [facility];  Service: ENT;  Laterality: Bilateral;    TONSILLECTOMY  2007    TUBAL LIGATION      UPPER GASTROINTESTINAL ENDOSCOPY      uterine ablation         CURRENT MEDS:  Current Outpatient Medications   Medication Sig Dispense Refill    acetaZOLAMIDE (DIAMOX) 250 MG tablet Take 1 tablet (250 mg total) by mouth once daily. 90 tablet 3    buPROPion (WELLBUTRIN SR) 200 MG SR12 Take 1 tablet (200 mg total) by mouth once daily. 90 tablet 0    cholecalciferol, vitamin D3, (VITAMIN D3) 50 mcg (2,000 unit) Tab Take 1 tablet (2,000 Units total) by mouth once daily. 90 each 3    lamoTRIgine (LAMICTAL) 150 MG Tab Take 1 tablet (150 mg total) by mouth once daily. 90 tablet 0    LORazepam (ATIVAN) 0.5 MG tablet Take 0.5 mg by mouth  daily as needed.      rizatriptan (MAXALT) 10 MG tablet Take 1 tablet (10 mg total) by mouth once as needed for Migraine (May repeat 1x after 2 hours if no symptom relief). 12 tablet 3     No current facility-administered medications for this visit.       ALLERGIES:  Review of patient's allergies indicates:   Allergen Reactions    Percocet [oxycodone-acetaminophen] Nausea And Vomiting    Sulfa (sulfonamide antibiotics) Rash    Sulfadiazine     Phentermine Rash       FAMILY HISTORY:  Family History   Problem Relation Name Age of Onset    Hypertension Father      Diabetes Paternal Grandfather      Asthma Maternal Uncle      Asthma Cousin      Cancer Neg Hx      Heart disease Neg Hx      Stroke Neg Hx         SOCIAL HISTORY:  Social History     Tobacco Use    Smoking status: Former     Current packs/day: 0.00     Average packs/day: 0.3 packs/day for 10.0 years (2.5 ttl pk-yrs)     Types: Cigarettes     Start date: 2002     Quit date: 2012     Years since quittin.2     Passive exposure: Never    Smokeless tobacco: Never    Tobacco comments:     quit smoking    Substance Use Topics    Alcohol use: Not Currently    Drug use: No       Review of Systems:  12 system review of systems is negative except for the symptoms mentioned in HPI.      Objective:     There were no vitals filed for this visit.  General: NAD, well nourished   Eyes: no tearing, discharge, no erythema   ENT: moist mucous membranes of the oral cavity, nares patent    Neck: Supple, full range of motion  Cardiovascular: Appears well perfused  Lungs: Normal work of breathing, normal chest wall excursions  Skin: No rash, lesions, or breakdown on exposed skin  Psychiatry: Mood and affect are appropriate   Abdomen: nondistended, non tender  Extremeties: No cyanosis, clubbing or edema visible    Neurological   MENTAL STATUS: Alert and oriented to person, place, and time. Attention and concentration within normal limits. Speech without  dysarthria. Recent and remote memory within normal limits   CRANIAL NERVES: Visual fields intact. PERRL. EOMI. Facial sensation intact. Face symmetrical. Hearing grossly intact. Full shoulder shrug bilaterally. Tongue protrudes midline   SENSORY: Sensation is intact to light touch throughout.   MOTOR: Normal bulk.  Moves all extremities symmetrically.  CEREBELLAR/COORDINATION/GAIT:  Gross motor movements move

## 2025-06-13 ENCOUNTER — TELEPHONE (OUTPATIENT)
Dept: ORTHOPEDICS | Facility: CLINIC | Age: 45
End: 2025-06-13
Payer: COMMERCIAL

## 2025-06-13 NOTE — TELEPHONE ENCOUNTER
Hello, this is a message to notify you regarding your xray appointment at [place] Location - 1st floor check in [address] [facility] [address] 30 minutes prior to your appointment time on 6.16.25 with Dr. Melchor for x-rays.     Please arrive a little early to check in prior to your appointment approximately at 2:45pm.     Xray does run behind sometimes, we do appreciate your patience in advance. If you would like to get your xray before your appointment please reschedule your xray at a location near you at your convenience through the [facility].    *Please arrive at your informed time above, if you are more than 15 Minutes late to your appointment with Dr. Melchor we will have to reschedule your appointment. This will allow you to be seen in a timely manor and be conscious to other patients being seen that same day*     Please respond to this message to confirm you received this notification.

## 2025-06-23 ENCOUNTER — E-VISIT (OUTPATIENT)
Dept: OBSTETRICS AND GYNECOLOGY | Facility: CLINIC | Age: 45
End: 2025-06-23
Payer: COMMERCIAL

## 2025-06-23 ENCOUNTER — PATIENT MESSAGE (OUTPATIENT)
Dept: OBSTETRICS AND GYNECOLOGY | Facility: CLINIC | Age: 45
End: 2025-06-23

## 2025-06-23 DIAGNOSIS — N30.00 ACUTE CYSTITIS WITHOUT HEMATURIA: Primary | ICD-10-CM

## 2025-06-23 RX ORDER — NITROFURANTOIN 25; 75 MG/1; MG/1
100 CAPSULE ORAL 2 TIMES DAILY
Qty: 14 CAPSULE | Refills: 0 | Status: SHIPPED | OUTPATIENT
Start: 2025-06-23 | End: 2025-06-30

## 2025-06-23 NOTE — PROGRESS NOTES
Patient ID: Sarah Isabel is a 45 y.o. female.    Chief Complaint: General Illness (Entered automatically based on patient selection in [employer].)    The patient initiated a request through [employer] on 6/23/2025 for evaluation and management with a chief complaint of General Illness (Entered automatically based on patient selection in [employer].)     I evaluated the questionnaire submission on 6/23/2025 .    [facility]    6/23/2025  7:44 AM CDT - Filed by Patient   What do you need help with? Other Concern   Do you agree to participate in an E-Visit? Yes   If you have any of the following symptoms, please go to the nearest emergency room or call 911: I acknowledge   Do you have any of the following pregnancy-related conditions? None   What is the main issue you would like addressed today? Pelvic pain   Please describe your symptoms. Throbbing/stabbing pain on both sides of lower abdomen, but mainly on left side. Feel like i constantly need to urinate and feel bloated. Coughing or straining make the pain worse   Where is your problem located? Lower abdomen   On a scale of 1-10, where 10 is the worst you can imagine, how severe are your symptoms? (range: 1 - 10) 5   Have you had these symptoms before? No   How long have you had these symptoms? A few days   What helps with your symptoms? Laying down helps some   What makes your symptoms feel worse? Walking, coughing, straining, pushing on the area   Are your symptoms related to a condition you currently have? Not sure   Please describe any probable cause for your symptoms. I know i have ovarian fibroids so not sure if it is that. Ive had pain before but this is worse   Provide any additional information you feel is important.    Please attach any relevant images or files    Are you able to take your vital signs? No         Encounter Diagnosis   Name Primary?    Acute cystitis without hematuria Yes        No orders of the defined types were placed  in this encounter.     Medications Ordered This Encounter   Medications    nitrofurantoin, macrocrystal-monohydrate, (MACROBID) 100 MG capsule     Sig: Take 1 capsule (100 mg total) by mouth 2 (two) times daily. for 7 days     Dispense:  14 capsule     Refill:  0        Follow up if symptoms worsen or fail to improve.      E-Visit Time Tracking:

## 2025-06-27 ENCOUNTER — PATIENT MESSAGE (OUTPATIENT)
Dept: PRIMARY CARE CLINIC | Facility: CLINIC | Age: 45
End: 2025-06-27
Payer: COMMERCIAL

## 2025-08-08 ENCOUNTER — TELEPHONE (OUTPATIENT)
Dept: OTOLARYNGOLOGY | Facility: CLINIC | Age: 45
End: 2025-08-08

## 2025-08-08 NOTE — TELEPHONE ENCOUNTER
Patient jumped on the schedule for hearing issues but says she has no wax so she will need a hearing test, I scheduled her correctly on  8/13/2025 @1:30 and 2:00 to see jc

## 2025-08-11 ENCOUNTER — OFFICE VISIT (OUTPATIENT)
Facility: CLINIC | Age: 45
End: 2025-08-11
Payer: COMMERCIAL

## 2025-08-11 ENCOUNTER — HOSPITAL ENCOUNTER (OUTPATIENT)
Dept: RADIOLOGY | Facility: HOSPITAL | Age: 45
Discharge: HOME | End: 2025-08-11
Attending: ORTHOPAEDIC SURGERY
Payer: COMMERCIAL

## 2025-08-11 DIAGNOSIS — M79.642 LEFT HAND PAIN: ICD-10-CM

## 2025-08-11 DIAGNOSIS — M18.11 PRIMARY OSTEOARTHRITIS OF FIRST CARPOMETACARPAL JOINT OF RIGHT HAND: ICD-10-CM

## 2025-08-11 DIAGNOSIS — G56.01 CARPAL TUNNEL SYNDROME OF RIGHT WRIST: Primary | ICD-10-CM

## 2025-08-11 DIAGNOSIS — G56.01 CARPAL TUNNEL SYNDROME OF RIGHT WRIST: ICD-10-CM

## 2025-08-11 PROCEDURE — 99999 PR PBB SHADOW E&M-EST. PATIENT-LVL III: CPT | Mod: PBBFAC,,, | Performed by: ORTHOPAEDIC SURGERY

## 2025-08-11 PROCEDURE — 1159F MED LIST DOCD IN RCRD: CPT | Mod: CPTII,S$GLB,, | Performed by: ORTHOPAEDIC SURGERY

## 2025-08-11 PROCEDURE — 73130 X-RAY EXAM OF HAND: CPT | Mod: TC,50

## 2025-08-11 PROCEDURE — 73130 X-RAY EXAM OF HAND: CPT | Mod: 26,,, | Performed by: RADIOLOGY

## 2025-08-11 PROCEDURE — 1160F RVW MEDS BY RX/DR IN RCRD: CPT | Mod: CPTII,S$GLB,, | Performed by: ORTHOPAEDIC SURGERY

## 2025-08-11 PROCEDURE — 99214 OFFICE O/P EST MOD 30 MIN: CPT | Mod: 25,S$GLB,, | Performed by: ORTHOPAEDIC SURGERY

## 2025-08-21 ENCOUNTER — OFFICE VISIT (OUTPATIENT)
Dept: OTOLARYNGOLOGY | Facility: CLINIC | Age: 45
End: 2025-08-21
Payer: COMMERCIAL

## 2025-08-21 ENCOUNTER — CLINICAL SUPPORT (OUTPATIENT)
Dept: AUDIOLOGY | Facility: CLINIC | Age: 45
End: 2025-08-21
Payer: COMMERCIAL

## 2025-08-21 VITALS
WEIGHT: 211.19 LBS | DIASTOLIC BLOOD PRESSURE: 87 MMHG | BODY MASS INDEX: 39.87 KG/M2 | HEIGHT: 61 IN | SYSTOLIC BLOOD PRESSURE: 130 MMHG

## 2025-08-21 DIAGNOSIS — H90.3 SENSORINEURAL HEARING LOSS (SNHL), BILATERAL: Primary | ICD-10-CM

## 2025-08-21 DIAGNOSIS — H93.13 TINNITUS OF BOTH EARS: ICD-10-CM

## 2025-08-21 DIAGNOSIS — H90.3 SENSORINEURAL HEARING LOSS (SNHL) OF BOTH EARS: Primary | ICD-10-CM

## 2025-08-21 PROCEDURE — 99203 OFFICE O/P NEW LOW 30 MIN: CPT | Mod: S$GLB,,, | Performed by: NURSE PRACTITIONER

## 2025-08-21 PROCEDURE — 1159F MED LIST DOCD IN RCRD: CPT | Mod: CPTII,S$GLB,, | Performed by: NURSE PRACTITIONER

## 2025-08-21 PROCEDURE — 3079F DIAST BP 80-89 MM HG: CPT | Mod: CPTII,S$GLB,, | Performed by: NURSE PRACTITIONER

## 2025-08-21 PROCEDURE — 3075F SYST BP GE 130 - 139MM HG: CPT | Mod: CPTII,S$GLB,, | Performed by: NURSE PRACTITIONER

## 2025-08-21 PROCEDURE — 3008F BODY MASS INDEX DOCD: CPT | Mod: CPTII,S$GLB,, | Performed by: NURSE PRACTITIONER

## (undated) DEVICE — SUT PLAIN 4-0 SC-1 18IN

## (undated) DEVICE — BLADE SCALP OPHTL RND TIP

## (undated) DEVICE — SEE MEDLINE ITEM 157131

## (undated) DEVICE — SYS CLSR WND ENDOSCP XL

## (undated) DEVICE — SUT 4-0 CHROMIC GUT / RB1

## (undated) DEVICE — LUBRICANT SURGILUBE 2 OZ

## (undated) DEVICE — CATH IV INTROCAN 14G X 2.

## (undated) DEVICE — CLOSURE SKIN STERI STRIP 1/2X4

## (undated) DEVICE — SEE MEDLINE ITEM 152622

## (undated) DEVICE — SHEATH LENS CLN 4MM 0D A70940A

## (undated) DEVICE — ELECTRODE REM PLYHSV RETURN 9

## (undated) DEVICE — WARMER DRAPE STERILE LF

## (undated) DEVICE — TROCAR ENDOPATH XCEL 11MM 10CM

## (undated) DEVICE — SEE MEDLINE ITEM 146372

## (undated) DEVICE — SUT ENDOLOOP PDSII 18 LIGA

## (undated) DEVICE — NDL 22GA X1 1/2 REG BEVEL

## (undated) DEVICE — TUBING HF INSUFFLATION W/ FLTR

## (undated) DEVICE — SUT MONOCRYL 4-0 PS-2

## (undated) DEVICE — TROCAR ENDOPATH ECEL

## (undated) DEVICE — TROCAR ENDOPATH XCEL 5MM 7.5CM

## (undated) DEVICE — SUT D SPECIAL NUROLON 0 ENS

## (undated) DEVICE — MANIFOLD 4 PORT

## (undated) DEVICE — SYS CLSR DERMABOND PRINEO 22CM

## (undated) DEVICE — APPLICATOR CHLORAPREP ORN 26ML

## (undated) DEVICE — SUT VCRL ENDOLOOP 0 18 VIOL

## (undated) DEVICE — DRAPE STERI INSTRUMENT 1018

## (undated) DEVICE — CART STAPLE FLEX ETX 3.5MM BLU

## (undated) DEVICE — DRESSING SURGICAL 1X3

## (undated) DEVICE — GLOVE PROTEXIS HYDROGEL SZ7

## (undated) DEVICE — TROCAR ENDOPATH XCEL 12MM 10CM

## (undated) DEVICE — DRESSING LEUKOPLAST FLEX 1X3IN

## (undated) DEVICE — SUT GUT PL. 4-0 27 FS-2

## (undated) DEVICE — BANDAGE ADHESIVE

## (undated) DEVICE — SEE MEDLINE ITEM 156952

## (undated) DEVICE — SUT 0 VICRYL / UR6 (J603)

## (undated) DEVICE — ADHESIVE MASTISOL VIAL 48/BX

## (undated) DEVICE — SUT VICRYL 3-0 27 SH

## (undated) DEVICE — LOOP VESSEL BLUE MAXI

## (undated) DEVICE — CART STAPLE RELD 45MM WHT

## (undated) DEVICE — SOL NS 1000CC

## (undated) DEVICE — BLADE INFERIOR TURBINATE 5/PK

## (undated) DEVICE — SUPPORT ULNA NERVE PROTECTOR

## (undated) DEVICE — TROCAR ENDOPATH XCEL 5X100MM

## (undated) DEVICE — SHEARS HARMONIC 5CM 36CM

## (undated) DEVICE — IRRIGATOR ENDOSCOPY DISP.

## (undated) DEVICE — NDL SPINAL SPINOCAN 22GX3.5

## (undated) DEVICE — SUT VICRYL PLUS 4-0 PS2 27

## (undated) DEVICE — STAPLER INT LINEAR ARTC 3.5-45

## (undated) DEVICE — TRAY FOLEY 16FR INFECTION CONT

## (undated) DEVICE — SEE MEDLINE ITEM 156913

## (undated) DEVICE — ADHESIVE DERMABOND ADVANCED

## (undated) DEVICE — PACK LAPSCP/PELVSCPY III TIBRN

## (undated) DEVICE — NDL HYPO REG 25G X 1 1/2

## (undated) DEVICE — TRAY MINOR GEN SURG

## (undated) DEVICE — SUT D SPECIAL

## (undated) DEVICE — NDL INSUF ULTRA VERESS 120MM

## (undated) DEVICE — BLADE SURG CARBON STEEL SZ11

## (undated) DEVICE — SCISSOR 5MMX35CM DIRECT DRIVE